# Patient Record
Sex: FEMALE | Race: WHITE | Employment: OTHER | URBAN - METROPOLITAN AREA
[De-identification: names, ages, dates, MRNs, and addresses within clinical notes are randomized per-mention and may not be internally consistent; named-entity substitution may affect disease eponyms.]

---

## 2017-01-11 ENCOUNTER — OFFICE VISIT (OUTPATIENT)
Dept: FAMILY MEDICINE CLINIC | Age: 67
End: 2017-01-11

## 2017-01-11 VITALS
SYSTOLIC BLOOD PRESSURE: 129 MMHG | HEIGHT: 67 IN | BODY MASS INDEX: 23.57 KG/M2 | WEIGHT: 150.2 LBS | TEMPERATURE: 97.8 F | HEART RATE: 67 BPM | RESPIRATION RATE: 12 BRPM | DIASTOLIC BLOOD PRESSURE: 86 MMHG

## 2017-01-11 DIAGNOSIS — L65.9 HAIR THINNING: ICD-10-CM

## 2017-01-11 DIAGNOSIS — E03.9 ACQUIRED HYPOTHYROIDISM: ICD-10-CM

## 2017-01-11 DIAGNOSIS — I10 ESSENTIAL HYPERTENSION: Primary | ICD-10-CM

## 2017-01-11 DIAGNOSIS — E78.00 HYPERCHOLESTEREMIA: ICD-10-CM

## 2017-01-11 DIAGNOSIS — M25.552 HIP PAIN, BILATERAL: ICD-10-CM

## 2017-01-11 DIAGNOSIS — M25.542 ARTHRALGIA OF BOTH HANDS: ICD-10-CM

## 2017-01-11 DIAGNOSIS — M25.551 HIP PAIN, BILATERAL: ICD-10-CM

## 2017-01-11 DIAGNOSIS — M25.541 ARTHRALGIA OF BOTH HANDS: ICD-10-CM

## 2017-01-11 LAB
ALBUMIN SERPL-MCNC: 4.6 GM/DL (ref 3.5–5.1)
ALP BLD-CCNC: 123 U/L (ref 38–126)
ALT SERPL-CCNC: 13 U/L (ref 11–66)
ANION GAP SERPL CALCULATED.3IONS-SCNC: 13 MEQ/L (ref 8–16)
AST SERPL-CCNC: 20 U/L (ref 5–40)
BACTERIA: NORMAL
BILIRUB SERPL-MCNC: 0.5 MG/DL (ref 0.3–1.2)
BILIRUBIN URINE: NEGATIVE
BLOOD, URINE: NEGATIVE
BUN BLDV-MCNC: 17 MG/DL (ref 7–22)
C-REACTIVE PROTEIN: 1.28 MG/DL (ref 0–1)
CALCIUM SERPL-MCNC: 9.8 MG/DL (ref 8.5–10.5)
CASTS: NORMAL /LPF
CASTS: NORMAL /LPF
CHARACTER, URINE: CLEAR
CHLORIDE BLD-SCNC: 100 MEQ/L (ref 98–111)
CHOLESTEROL, TOTAL: 268 MG/DL (ref 100–199)
CO2: 28 MEQ/L (ref 23–33)
COLOR: YELLOW
CREAT SERPL-MCNC: 0.7 MG/DL (ref 0.4–1.2)
CRYSTALS: NORMAL
EPITHELIAL CELLS, UA: NORMAL /HPF
FOLATE: > 20 NG/ML (ref 4.8–24.2)
GFR SERPL CREATININE-BSD FRML MDRD: 84 ML/MIN/1.73M2
GLUCOSE BLD-MCNC: 102 MG/DL (ref 70–108)
GLUCOSE, URINE: NEGATIVE MG/DL
HCT VFR BLD CALC: 41.2 % (ref 37–47)
HDLC SERPL-MCNC: 77 MG/DL
HEMOGLOBIN: 14 GM/DL (ref 12–16)
KETONES, URINE: NEGATIVE
LDL CHOLESTEROL CALCULATED: 168 MG/DL
LEUKOCYTE ESTERASE, URINE: NEGATIVE
MCH RBC QN AUTO: 30.3 PG (ref 27–31)
MCHC RBC AUTO-ENTMCNC: 34 GM/DL (ref 33–37)
MCV RBC AUTO: 89 FL (ref 81–99)
MISCELLANEOUS LAB TEST RESULT: NORMAL
NITRITE, URINE: NEGATIVE
PDW BLD-RTO: 13.3 % (ref 11.5–14.5)
PH UA: 6.5
PLATELET # BLD: 406 THOU/MM3 (ref 130–400)
PMV BLD AUTO: 7.6 MCM (ref 7.4–10.4)
POTASSIUM SERPL-SCNC: 4.1 MEQ/L (ref 3.5–5.2)
PROTEIN UA: NEGATIVE MG/DL
RBC # BLD: 4.63 MILL/MM3 (ref 4.2–5.4)
RBC URINE: NORMAL /HPF
RENAL EPITHELIAL, UA: NORMAL
SODIUM BLD-SCNC: 141 MEQ/L (ref 135–145)
SPECIFIC GRAVITY UA: 1.03 (ref 1–1.03)
TOTAL PROTEIN: 8.1 GM/DL (ref 6.1–8)
TRIGL SERPL-MCNC: 117 MG/DL (ref 0–199)
TSH SERPL DL<=0.05 MIU/L-ACNC: 1.4 MCIU/ML (ref 0.4–4.2)
UROBILINOGEN, URINE: 0.2 EU/DL
VITAMIN B-12: 562 PG/ML (ref 211–911)
WBC # BLD: 7.7 THOU/MM3 (ref 4.8–10.8)
WBC UA: NORMAL /HPF
YEAST: NORMAL

## 2017-01-11 PROCEDURE — 99214 OFFICE O/P EST MOD 30 MIN: CPT | Performed by: FAMILY MEDICINE

## 2017-01-11 PROCEDURE — 90732 PPSV23 VACC 2 YRS+ SUBQ/IM: CPT | Performed by: FAMILY MEDICINE

## 2017-01-11 PROCEDURE — 90471 IMMUNIZATION ADMIN: CPT | Performed by: FAMILY MEDICINE

## 2017-01-12 ENCOUNTER — TELEPHONE (OUTPATIENT)
Dept: FAMILY MEDICINE CLINIC | Age: 67
End: 2017-01-12

## 2017-01-12 DIAGNOSIS — E78.00 HIGH CHOLESTEROL: Primary | ICD-10-CM

## 2017-01-12 LAB
THYROGLOBULIN AB: < 0.9 IU/ML (ref 0–4)
THYROID PEROXIDASE ANTIBODY: 0.9 IU/ML (ref 0–9)

## 2017-01-13 ENCOUNTER — TELEPHONE (OUTPATIENT)
Dept: FAMILY MEDICINE CLINIC | Age: 67
End: 2017-01-13

## 2017-01-13 LAB
ALLERGEN INTERPRETATION/SCORE: NORMAL
ALLERGEN LATEX: NORMAL
ANA SCREEN: NORMAL

## 2017-01-16 ENCOUNTER — TELEPHONE (OUTPATIENT)
Dept: FAMILY MEDICINE CLINIC | Age: 67
End: 2017-01-16

## 2017-03-13 RX ORDER — LEVOTHYROXINE SODIUM 0.05 MG/1
50 TABLET ORAL DAILY
Qty: 90 TABLET | Refills: 1 | Status: SHIPPED | OUTPATIENT
Start: 2017-03-13 | End: 2017-09-09 | Stop reason: SDUPTHER

## 2017-03-13 RX ORDER — LISINOPRIL 20 MG/1
20 TABLET ORAL DAILY
Qty: 90 TABLET | Refills: 1 | Status: SHIPPED | OUTPATIENT
Start: 2017-03-13 | End: 2017-04-17

## 2017-04-17 ENCOUNTER — OFFICE VISIT (OUTPATIENT)
Dept: FAMILY MEDICINE CLINIC | Age: 67
End: 2017-04-17

## 2017-04-17 VITALS
DIASTOLIC BLOOD PRESSURE: 80 MMHG | HEIGHT: 67 IN | TEMPERATURE: 97.6 F | RESPIRATION RATE: 12 BRPM | SYSTOLIC BLOOD PRESSURE: 120 MMHG | BODY MASS INDEX: 20.94 KG/M2 | HEART RATE: 60 BPM | WEIGHT: 133.4 LBS

## 2017-04-17 DIAGNOSIS — Z13.6 SCREENING FOR CARDIOVASCULAR CONDITION: ICD-10-CM

## 2017-04-17 DIAGNOSIS — Z13.1 SCREENING FOR DIABETES MELLITUS (DM): ICD-10-CM

## 2017-04-17 DIAGNOSIS — Z13.820 OSTEOPOROSIS SCREENING: ICD-10-CM

## 2017-04-17 DIAGNOSIS — Z78.0 ASYMPTOMATIC MENOPAUSAL STATE: ICD-10-CM

## 2017-04-17 DIAGNOSIS — Z11.59 NEED FOR HEPATITIS C SCREENING TEST: ICD-10-CM

## 2017-04-17 DIAGNOSIS — Z11.4 SCREENING FOR HIV WITHOUT PRESENCE OF RISK FACTORS: ICD-10-CM

## 2017-04-17 DIAGNOSIS — Z00.00 ROUTINE GENERAL MEDICAL EXAMINATION AT A HEALTH CARE FACILITY: Primary | ICD-10-CM

## 2017-04-17 DIAGNOSIS — Z72.89 OTHER PROBLEMS RELATED TO LIFESTYLE: ICD-10-CM

## 2017-04-17 DIAGNOSIS — Z23 NEED FOR SHINGLES VACCINE: ICD-10-CM

## 2017-04-17 LAB
CHOLESTEROL, TOTAL: 201 MG/DL (ref 100–199)
GLUCOSE BLD-MCNC: 105 MG/DL (ref 70–108)
HDLC SERPL-MCNC: 67 MG/DL
HEPATITIS C ANTIBODY: NEGATIVE
LDL CHOLESTEROL CALCULATED: 121 MG/DL
TRIGL SERPL-MCNC: 66 MG/DL (ref 0–199)

## 2017-04-17 PROCEDURE — G0403 EKG FOR INITIAL PREVENT EXAM: HCPCS | Performed by: FAMILY MEDICINE

## 2017-04-17 PROCEDURE — G0402 INITIAL PREVENTIVE EXAM: HCPCS | Performed by: FAMILY MEDICINE

## 2017-04-17 PROCEDURE — 36415 COLL VENOUS BLD VENIPUNCTURE: CPT | Performed by: FAMILY MEDICINE

## 2017-04-17 RX ORDER — LISINOPRIL 10 MG/1
10 TABLET ORAL DAILY
Qty: 30 TABLET | Refills: 3 | Status: SHIPPED | OUTPATIENT
Start: 2017-04-17 | End: 2017-04-18 | Stop reason: SDUPTHER

## 2017-04-17 RX ORDER — KRILL/OM-3/DHA/EPA/PHOSPHO/AST 500MG-86MG
1 CAPSULE ORAL DAILY
COMMUNITY

## 2017-04-17 ASSESSMENT — LIFESTYLE VARIABLES
HOW OFTEN DURING THE LAST YEAR HAVE YOU NEEDED AN ALCOHOLIC DRINK FIRST THING IN THE MORNING TO GET YOURSELF GOING AFTER A NIGHT OF HEAVY DRINKING: 0
HOW OFTEN DURING THE LAST YEAR HAVE YOU FOUND THAT YOU WERE NOT ABLE TO STOP DRINKING ONCE YOU HAD STARTED: 0
HAVE YOU OR SOMEONE ELSE BEEN INJURED AS A RESULT OF YOUR DRINKING: 0
HAS A RELATIVE, FRIEND, DOCTOR, OR ANOTHER HEALTH PROFESSIONAL EXPRESSED CONCERN ABOUT YOUR DRINKING OR SUGGESTED YOU CUT DOWN: 0
AUDIT-C TOTAL SCORE: 1
HOW OFTEN DURING THE LAST YEAR HAVE YOU HAD A FEELING OF GUILT OR REMORSE AFTER DRINKING: 0
HOW OFTEN DO YOU HAVE A DRINK CONTAINING ALCOHOL: 1
HOW OFTEN DURING THE LAST YEAR HAVE YOU BEEN UNABLE TO REMEMBER WHAT HAPPENED THE NIGHT BEFORE BECAUSE YOU HAD BEEN DRINKING: 0
HOW MANY STANDARD DRINKS CONTAINING ALCOHOL DO YOU HAVE ON A TYPICAL DAY: 0
HOW OFTEN DO YOU HAVE SIX OR MORE DRINKS ON ONE OCCASION: 0
HOW OFTEN DURING THE LAST YEAR HAVE YOU FAILED TO DO WHAT WAS NORMALLY EXPECTED FROM YOU BECAUSE OF DRINKING: 0
AUDIT TOTAL SCORE: 1

## 2017-04-17 ASSESSMENT — PATIENT HEALTH QUESTIONNAIRE - PHQ9: SUM OF ALL RESPONSES TO PHQ QUESTIONS 1-9: 0

## 2017-04-17 ASSESSMENT — ANXIETY QUESTIONNAIRES: GAD7 TOTAL SCORE: 0

## 2017-04-18 ENCOUNTER — TELEPHONE (OUTPATIENT)
Dept: FAMILY MEDICINE CLINIC | Age: 67
End: 2017-04-18

## 2017-04-18 RX ORDER — LISINOPRIL 10 MG/1
10 TABLET ORAL DAILY
Qty: 90 TABLET | Refills: 1 | Status: SHIPPED | OUTPATIENT
Start: 2017-04-18 | End: 2017-10-17 | Stop reason: SDUPTHER

## 2017-04-19 ENCOUNTER — TELEPHONE (OUTPATIENT)
Dept: FAMILY MEDICINE CLINIC | Age: 67
End: 2017-04-19

## 2017-04-19 LAB — HIV-2 AB: NEGATIVE

## 2017-05-12 ENCOUNTER — TELEPHONE (OUTPATIENT)
Dept: FAMILY MEDICINE CLINIC | Age: 67
End: 2017-05-12

## 2017-10-17 ENCOUNTER — OFFICE VISIT (OUTPATIENT)
Dept: FAMILY MEDICINE CLINIC | Age: 67
End: 2017-10-17
Payer: MEDICARE

## 2017-10-17 VITALS
HEART RATE: 68 BPM | OXYGEN SATURATION: 98 % | DIASTOLIC BLOOD PRESSURE: 74 MMHG | HEIGHT: 67 IN | SYSTOLIC BLOOD PRESSURE: 132 MMHG | WEIGHT: 138 LBS | BODY MASS INDEX: 21.66 KG/M2

## 2017-10-17 DIAGNOSIS — M51.36 DEGENERATIVE DISC DISEASE, LUMBAR: ICD-10-CM

## 2017-10-17 DIAGNOSIS — E03.9 ACQUIRED HYPOTHYROIDISM: ICD-10-CM

## 2017-10-17 DIAGNOSIS — E78.00 HYPERCHOLESTEREMIA: ICD-10-CM

## 2017-10-17 DIAGNOSIS — I10 ESSENTIAL HYPERTENSION: Primary | ICD-10-CM

## 2017-10-17 PROCEDURE — 3017F COLORECTAL CA SCREEN DOC REV: CPT | Performed by: FAMILY MEDICINE

## 2017-10-17 PROCEDURE — G8484 FLU IMMUNIZE NO ADMIN: HCPCS | Performed by: FAMILY MEDICINE

## 2017-10-17 PROCEDURE — 1090F PRES/ABSN URINE INCON ASSESS: CPT | Performed by: FAMILY MEDICINE

## 2017-10-17 PROCEDURE — 4040F PNEUMOC VAC/ADMIN/RCVD: CPT | Performed by: FAMILY MEDICINE

## 2017-10-17 PROCEDURE — G8427 DOCREV CUR MEDS BY ELIG CLIN: HCPCS | Performed by: FAMILY MEDICINE

## 2017-10-17 PROCEDURE — 99214 OFFICE O/P EST MOD 30 MIN: CPT | Performed by: FAMILY MEDICINE

## 2017-10-17 PROCEDURE — G8400 PT W/DXA NO RESULTS DOC: HCPCS | Performed by: FAMILY MEDICINE

## 2017-10-17 PROCEDURE — G8420 CALC BMI NORM PARAMETERS: HCPCS | Performed by: FAMILY MEDICINE

## 2017-10-17 PROCEDURE — 3014F SCREEN MAMMO DOC REV: CPT | Performed by: FAMILY MEDICINE

## 2017-10-17 PROCEDURE — 1036F TOBACCO NON-USER: CPT | Performed by: FAMILY MEDICINE

## 2017-10-17 PROCEDURE — 1123F ACP DISCUSS/DSCN MKR DOCD: CPT | Performed by: FAMILY MEDICINE

## 2017-10-17 RX ORDER — CYCLOBENZAPRINE HCL 5 MG
5 TABLET ORAL 3 TIMES DAILY PRN
Qty: 270 TABLET | Refills: 1 | Status: CANCELLED | OUTPATIENT
Start: 2017-10-17

## 2017-10-17 RX ORDER — OMEPRAZOLE 40 MG/1
40 CAPSULE, DELAYED RELEASE ORAL DAILY
Qty: 90 CAPSULE | Refills: 1 | Status: CANCELLED | OUTPATIENT
Start: 2017-10-17

## 2017-10-17 RX ORDER — LISINOPRIL 10 MG/1
10 TABLET ORAL DAILY
Qty: 90 TABLET | Refills: 1 | Status: SHIPPED | OUTPATIENT
Start: 2017-10-17 | End: 2018-02-06 | Stop reason: SDUPTHER

## 2017-10-17 RX ORDER — FLUTICASONE PROPIONATE 50 MCG
1 SPRAY, SUSPENSION (ML) NASAL DAILY
Qty: 3 BOTTLE | Refills: 1 | Status: CANCELLED | OUTPATIENT
Start: 2017-10-17

## 2017-10-17 RX ORDER — HYDROCODONE BITARTRATE AND ACETAMINOPHEN 5; 325 MG/1; MG/1
1 TABLET ORAL EVERY 8 HOURS PRN
Qty: 90 TABLET | Refills: 0 | Status: CANCELLED | OUTPATIENT
Start: 2017-10-17

## 2017-10-17 RX ORDER — LEVOTHYROXINE SODIUM 0.05 MG/1
TABLET ORAL
Qty: 90 TABLET | Refills: 1 | Status: SHIPPED | OUTPATIENT
Start: 2017-10-17 | End: 2018-02-06 | Stop reason: SDUPTHER

## 2017-10-17 NOTE — PROGRESS NOTES
dust, mold    Sudafed [Pseudoephedrine Hcl] Other (See Comments)     Restless feeling. Review of Systems:     General ROS: negative for - fever or sleep disturbance. Psychological ROS: negative for - anxiety or depression  Respiratory ROS: no cough, shortness of breath, or wheezing  Cardiovascular ROS: no chest pain or dyspnea on exertion. No edema. Gastrointestinal ROS: negative for - abdominal pain, constipation,  or nausea/vomiting. Neurological ROS: negative for - dizziness or headaches  Musculo-skeletal ROS: positive back pain  Skin ROS: no rashes. positive for dry skin     Physical Examination:     Blood pressure 132/74, pulse 68, height 5' 6.53\" (1.69 m), weight 138 lb (62.6 kg), SpO2 98 %. General appearance - alert, well appearing, and in no distress  Mental status - alert, oriented to person, place, and time  HEENT - NC, AT, PERRLA, EOMI, anicteric sclerae. Ear - normal tympanic membrane bilateraly  Nose - no lesions, no drainage  Neck - supple, no significant adenopathy  Chest - clear to auscultation, no wheezes, rales or rhonchi  Heart - normal rate, regular rhythm  Abdomen - soft, nontender, nondistended, no masses or organomegaly  Extremities -  no pedal edema, no clubbing or cyanosis  Skin -  no rashes, no suspicious skin lesions noted    Assessment:    1. Essential hypertension     2. Acquired hypothyroidism     3. Hypercholesteremia     4.  Degenerative disc disease, lumbar         Plan:    Continue Lisinopril 10 mg 1 tablet PO daily  Continue Synthroid 50 ug 1 tablet PO daily  Low cholesterol diet  Continue Prilosec 40 mg 1 tablet PO daily  Continue Norco and Flexeril as needed when pain severe  She had her Influenza vaccine at work last Friday October 13, 2017  Follow up 6 months    Pat Irwin MD

## 2017-10-17 NOTE — PATIENT INSTRUCTIONS
million people vaccinated. This is much lower than the risk of severe complications from flu, which can be prevented by flu vaccine. · Tita James children who get the flu shot along with pneumococcal vaccine (PCV13) and/or DTaP vaccine at the same time might be slightly more likely to have a seizure caused by fever. Ask your doctor for more information. Tell your doctor if a child who is getting flu vaccine has ever had a seizure  Problems that could happen after any injected vaccine:  · People sometimes faint after a medical procedure, including vaccination. Sitting or lying down for about 15 minutes can help prevent fainting, and injuries caused by a fall. Tell your doctor if you feel dizzy, or have vision changes or ringing in the ears. · Some people get severe pain in the shoulder and have difficulty moving the arm where a shot was given. This happens very rarely. · Any medication can cause a severe allergic reaction. Such reactions from a vaccine are very rare, estimated at about 1 in a million doses, and would happen within a few minutes to a few hours after the vaccination. As with any medicine, there is a very remote chance of a vaccine causing a serious injury or death. The safety of vaccines is always being monitored. For more information, visit: www.cdc.gov/vaccinesafety/. What if there is a serious reaction? What should I look for? · Look for anything that concerns you, such as signs of a severe allergic reaction, very high fever, or unusual behavior. Signs of a severe allergic reaction can include hives, swelling of the face and throat, difficulty breathing, a fast heartbeat, dizziness, and weakness - usually within a few minutes to a few hours after the vaccination. What should I do? · If you think it is a severe allergic reaction or other emergency that can't wait, call 9-1-1 and get the person to the nearest hospital. Otherwise, call your doctor.   · Reactions should be reported to the \"Vaccine manage pain from this problem. · To relieve pain, use ice or heat (whichever feels better) on the affected area. ¨ Put ice or a cold pack on the area for 10 to 20 minutes at a time. Put a thin cloth between the ice and your skin. ¨ Put a warm water bottle, a heating pad set on low, or a warm cloth on your back. Put a thin cloth between the heating pad and your skin. Do not go to sleep with a heating pad on your skin. · Ask your doctor if you can take acetaminophen (such as Tylenol) or nonsteroidal anti-inflammatory drugs, such as ibuprofen or naproxen. Your doctor can prescribe stronger medicines if needed. Be safe with medicines. Read and follow all instructions on the label. · Get some exercise every day. Exercise is one of the best ways to help your back feel better and stay better. It's best to start each exercise slowly. You may notice a little soreness, and that's okay. But if an exercise makes your pain worse, stop doing it. Here are things you can try:  ¨ Walking. It's the simplest and maybe the best activity for your back. It gets your blood moving and helps your muscles stay strong. ¨ Exercises that gently stretch and strengthen your stomach, back, and leg muscles. The stronger those muscles are, the better they're able to protect your back. If you have constant or severe pain in your back or spine, you may need other treatments, such as physical therapy. In some cases, your doctor may suggest surgery. Follow-up care is a key part of your treatment and safety. Be sure to make and go to all appointments, and call your doctor if you are having problems. It's also a good idea to know your test results and keep a list of the medicines you take. Where can you learn more? Go to https://LeadFirechivo.Glympse. org and sign in to your Uro Jock account. Enter F268 in the CelluFuel box to learn more about \"Learning About Degenerative Disc Disease. \"     If you do not have an account, please click on the \"Sign Up Now\" link. Current as of: March 21, 2017  Content Version: 11.3  © 4695-6220 [employer]yes. Care instructions adapted under license by [facility] ([facility]). If you have questions about a medical condition or this instruction, always ask your healthcare professional. [address] any warranty or liability for your use of this information. Patient Education        Back Care and Preventing Injuries: Care Instructions  Your Care Instructions  You can hurt your back doing many everyday activities: lifting a heavy box, bending down to garden, exercising at the gym, and even getting out of bed. But you can keep your back strong and healthy by doing some exercises. You also can follow a few tips for sitting, sleeping, and lifting to avoid hurting your back again. Talk to your doctor before you start an exercise program. Ask for help if you want to learn more about keeping your back healthy. Follow-up care is a key part of your treatment and safety. Be sure to make and go to all appointments, and call your doctor if you are having problems. It's also a good idea to know your test results and keep a list of the medicines you take. How can you care for yourself at home? · Stay at a healthy weight to avoid strain on your lower back. · Do not smoke. Smoking increases the risk of osteoporosis, which weakens the spine. If you need help quitting, talk to your doctor about stop-smoking programs and medicines. These can increase your chances of quitting for good. · Make sure you sleep in a position that maintains your back's normal curves and on a mattress that feels comfortable. Sleep on your side with a pillow between your knees, or sleep on your back with a pillow under your knees. These positions can reduce strain on your back. · When you get out of bed, lie on your side and bend both knees. Drop your feet over the edge of the bed as you push up with both arms. Scoot to the edge of the bed. Make sure your feet are in line with your rear end (buttocks), and then stand up. · If you must stand for a long time, put one foot on a stool, ledge, or box. Exercise to strengthen your back and other muscles  · Get at least 30 minutes of exercise on most days of the week. Walking is a good choice. You also may want to do other activities, such as running, swimming, cycling, or playing tennis or team sports. · Stretch your back muscles. Here are few exercises to try:  Renelle Borne on your back with your knees bent and your feet flat on the floor. Gently pull one bent knee to your chest. Put that foot back on the floor, and then pull the other knee to your chest. Hold for 15 to 30 seconds. Repeat 2 to 4 times. ¨ Do pelvic tilts. Lie on your back with your knees bent. Tighten your stomach muscles. Pull your belly button (navel) in and up toward your ribs. You should feel like your back is pressing to the floor and your hips and pelvis are slightly lifting off the floor. Hold for 6 seconds while breathing smoothly. · Keep your core muscles strong. The muscles of your back, belly (abdomen), and buttocks support your spine. ¨ Pull in your belly, and imagine pulling your navel toward your spine. Hold this for 6 seconds, then relax. Remember to keep breathing normally as you tense your muscles. ¨ Do curl-ups. Always do them with your knees bent. Keep your low back on the floor, and curl your shoulders toward your knees using a smooth, slow motion. Keep your arms folded across your chest. If this bothers your neck, try putting your hands behind your neck (not your head), with your elbows spread apart. ¨ Lie on your back with your knees bent and your feet flat on the floor. Tighten your belly muscles, and then push with your feet and raise your buttocks up a few inches. Hold this position 6 seconds as you continue to breathe normally, then lower yourself slowly to the floor.  Repeat 8 to 12 times.  ¨ If you like group exercise, try Pilates or yoga. These classes have poses that strengthen the core muscles. Protect your back when you sit  · Place a small pillow, a rolled-up towel, or a lumbar roll in the curve of your back if you need extra support. · Sit in a chair that is low enough to let you place both feet flat on the floor with both knees nearly level with your hips. If your chair or desk is too high, use a foot rest to raise your knees. · When driving, keep your knees nearly level with your hips. Sit straight, and drive with both hands on the steering wheel. Your arms should be in a slightly bent position. · Try a kneeling chair, which helps tilt your hips forward. This takes pressure off your lower back. · Try sitting on an exercise ball. It can rock from side to side, which helps keep your back loose. Lift properly  · Squat down, bending at the hips and knees only. If you need to, put one knee to the floor and extend your other knee in front of you, bent at a right angle (half kneeling). · Press your chest straight forward. This helps keep your upper back straight while keeping a slight arch in your low back. · Hold the load as close to your body as possible, at the level of your navel. · Use your feet to change direction, taking small steps. · Lead with your hips as you change direction. Keep your shoulders in line with your hips as you move. Do not twist your body. · Set down your load carefully, squatting with your knees and hips only. When should you call for help? Watch closely for changes in your health, and be sure to contact your doctor if:  · You want more exercises to make your back and other core muscles stronger. Where can you learn more? Go to https://mecca.Pivot Data Center. org and sign in to your Appwiz account. Enter S810 in the Productify box to learn more about \"Back Care and Preventing Injuries: Care Instructions. \"     If you do not have an medicine for pain, take it as prescribed. ¨ If you are not taking a prescription pain medicine, ask your doctor if you can take an over-the-counter medicine. What else can you do? · Stretch and exercise. Exercises that increase flexibility may relieve your pain and make it easier for your muscles to keep your spine in a good, neutral position. And don't forget to keep walking. · Do self-massage. You can use self-massage to unwind after work or school or to energize yourself in the morning. You can easily massage your feet, hands, or neck. Self-massage works best if you are in comfortable clothes and are sitting or lying in a comfortable position. Use oil or lotion to massage bare skin. · Reduce stress. Back pain can lead to a vicious [place]: Distress about the pain tenses the muscles in your back, which in turn causes more pain. Learn how to relax your mind and your muscles to lower your stress. Where can you learn more? Go to https://Kohortchivo.UB.. org and sign in to your Appy Couple account. Enter W722 in the SLID box to learn more about \"Learning About Relief for Back Pain. \"     If you do not have an account, please click on the \"Sign Up Now\" link. Current as of: March 21, 2017  Content Version: 11.3  © 4653-0230 Transinsight. Care instructions adapted under license by [facility] ([facility]). If you have questions about a medical condition or this instruction, always ask your healthcare professional. [place] [address] any warranty or liability for your use of this information. Patient Education        Therapeutic Ball: Back Exercises  Your Care Instructions  Here are some examples of typical exercises for your condition. Start each exercise slowly. Ease off the exercise if you start to have pain. Your doctor or physical therapist will tell you when you can start these exercises and which ones will work best for you.   To prepare, make sure that your your side, press your hands onto the floor for stability. 4. Tighten your belly muscles by pulling in your belly button toward your spine. 5. Push your heels down toward the floor, squeeze your buttocks, and lift your hips off the floor until your shoulders, hips, and knees are all in a straight line. 6. While holding the bridge position, roll the ball toward you with your heels. Keep your hips as level as you can. 7. Pause briefly, and then roll the ball back out. Try to keep the ball rolling straight. You will feel the muscles in your lower belly working as you straighten your legs. 8. Lower your hips, and return to your starting position. 9. Repeat 8 to 12 times. When you can keep your body and the ball steady throughout this exercise, you're ready for more challenge. Try keeping your hips raised while rolling the ball out, holding the bridge, and rolling back, a few times in a row. Praying yao    1. Kneel upright with the ball in front of you. 2. To start, clasp your hands together. Rest them on the ball in front of you. 3. As you do this exercise, keep your back and hips straight and tighten your belly and buttocks muscles. Keep your knees in place. 4. Press on the ball with your arms. Lean forward from the knees. This rolls the ball forward. You will bear most of your weight on your arms. 5. If your back starts to ache, you've gone too far. Pull back a bit. 6. Roll back to the start position. 7. Repeat 8 to 12 times. Walk-out plank on ball    1. Kneel over the ball. Place your hands on the floor in front of you. 2. Walk your hands forward until your legs are straight on the ball. This is the plank position. 3. When in plank position, hold your body straight and tighten your belly and buttocks muscles. Keep your chin slightly tucked. 4. Roll as far forward as you can without losing your balance or letting your hips drop.  You may stop with the ball under your thighs, or even under your knees or shins. 5. Hold a few seconds, then walk your hands back and return to the start position. 6. Repeat 8 to 12 times. Push-up with thighs on ball    1. Kneel over the ball. Place your hands on the floor in front of you. 2. Walk your hands forward until your legs are straight on the ball. This is the plank position. 3. When in plank position, hold your body straight and tighten your belly and buttocks muscles. Keep your chin slightly tucked. 4. Roll as far forward as you can without losing your balance or letting your hips drop. You may stop with the ball under your thighs, or even under your knees or shins. 5. Bend your elbows. Slowly lower your body toward the ground as far as you can without losing your balance. 6. If your wrists hurt, try moving your hands a little farther apart so they're not right under your shoulders. 7. Slowly straighten your arms. 8. Do 8 to 12 of these push-ups. Wall squat with ball    1. Stand facing away from a wall. Place your feet about shoulder-width apart. 2. Place the ball between your middle back and the wall. Move your feet out in front of you so they are about a foot in front of your hips. 3. Keep your arms at your sides, or put your hands on your hips. 4. Slowly squat down as if you are going to sit in a chair, rolling your back over the ball as you squat. The ball should move with you but stay pressed into the wall. 5. Be sure that your knees do not go in front of your toes as you squat. 6. Hold for 6 seconds. 7. Slowly rise to your standing position. 8. Repeat 8 to 12 times. Child's pose with ball    1. Kneeling upright with your back straight, rest your hands on the ball in front of you. 2. Breathe out as you bend at the hips, and roll the ball forward. Lower your chest toward the ground, and drop your hips back toward your heels. 3. To stretch your upper back and shoulders, hold this position for 15 to 30 seconds. 4. Repeat 2 to 4 times.   Follow-up care is a key part of your treatment and safety. Be sure to make and go to all appointments, and call your doctor if you are having problems. It's also a good idea to know your test results and keep a list of the medicines you take. Where can you learn more? Go to https://chpepiceweb.Swiftype. org and sign in to your MD Lingo account. Enter P520 in the LifeBlinx box to learn more about \"Therapeutic Ball: Back Exercises. \"     If you do not have an account, please click on the \"Sign Up Now\" link. Current as of: March 21, 2017  Content Version: 11.3  © 6796-3321 [employer]. Care instructions adapted under license by [facility] ([facility]). If you have questions about a medical condition or this instruction, always ask your healthcare professional. [address] any warranty or liability for your use of this information. Patient Education        Low Back Pain: Exercises  Your Care Instructions  Here are some examples of typical rehabilitation exercises for your condition. Start each exercise slowly. Ease off the exercise if you start to have pain. Your doctor or physical therapist will tell you when you can start these exercises and which ones will work best for you. How to do the exercises  Press-up    7. Lie on your stomach, supporting your body with your forearms. 8. Press your elbows down into the floor to raise your upper back. As you do this, relax your stomach muscles and allow your back to arch without using your back muscles. As your press up, do not let your hips or pelvis come off the floor. 9. Hold for 15 to 30 seconds, then relax. 10. Repeat 2 to 4 times. Alternate arm and leg (bird dog) exercise    Note: Do this exercise slowly. Try to keep your body straight at all times, and do not let one hip drop lower than the other. 5. Start on the floor, on your hands and knees. 6. Tighten your belly muscles.   7. Raise one leg off the floor, and hold it straight out behind you. Be careful not to let your hip drop down, because that will twist your trunk. 8. Hold for about 6 seconds, then lower your leg and switch to the other leg. 9. Repeat 8 to 12 times on each leg. 10. Over time, work up to holding for 10 to 30 seconds each time. 11. If you feel stable and secure with your leg raised, try raising the opposite arm straight out in front of you at the same time. Knee-to-chest exercise    5. Lie on your back with your knees bent and your feet flat on the floor. 6. Bring one knee to your chest, keeping the other foot flat on the floor (or keeping the other leg straight, whichever feels better on your lower back). 7. Keep your lower back pressed to the floor. Hold for at least 15 to 30 seconds. 8. Relax, and lower the knee to the starting position. 9. Repeat with the other leg. Repeat 2 to 4 times with each leg. 10. To get more stretch, put your other leg flat on the floor while pulling your knee to your chest.  Curl-ups    5. Lie on the floor on your back with your knees bent at a 90-degree angle. Your feet should be flat on the floor, about 12 inches from your buttocks. 6. Cross your arms over your chest. If this bothers your neck, try putting your hands behind your neck (not your head), with your elbows spread apart. 7. Slowly tighten your belly muscles and raise your shoulder blades off the floor. 8. Keep your head in line with your body, and do not press your chin to your chest.  9. Hold this position for 1 or 2 seconds, then slowly lower yourself back down to the floor. 10. Repeat 8 to 12 times. Pelvic tilt exercise    6. Lie on your back with your knees bent. 7. \"Brace\" your stomach. This means to tighten your muscles by pulling in and imagining your belly button moving toward your spine. You should feel like your back is pressing to the floor and your hips and pelvis are rocking back.   8. Hold for about 6 seconds while you breathe smoothly. 9. Repeat 8 to 12 times. Heel dig bridging    5. Lie on your back with both knees bent and your ankles bent so that only your heels are digging into the floor. Your knees should be bent about 90 degrees. 6. Then push your heels into the floor, squeeze your buttocks, and lift your hips off the floor until your shoulders, hips, and knees are all in a straight line. 7. Hold for about 6 seconds as you continue to breathe normally, and then slowly lower your hips back down to the floor and rest for up to 10 seconds. 8. Do 8 to 12 repetitions. Hamstring stretch in doorway    9. Lie on your back in a doorway, with one leg through the open door. 10. Slide your leg up the wall to straighten your knee. You should feel a gentle stretch down the back of your leg. 11. Hold the stretch for at least 15 to 30 seconds. Do not arch your back, point your toes, or bend either knee. Keep one heel touching the floor and the other heel touching the wall. 12. Repeat with your other leg. 13. Do 2 to 4 times for each leg. Hip flexor stretch    10. Kneel on the floor with one knee bent and one leg behind you. Place your forward knee over your foot. Keep your other knee touching the floor. 11. Slowly push your hips forward until you feel a stretch in the upper thigh of your rear leg. 12. Hold the stretch for at least 15 to 30 seconds. Repeat with your other leg. 13. Do 2 to 4 times on each side. Wall sit    8. Stand with your back 10 to 12 inches away from a wall. 9. Lean into the wall until your back is flat against it. 10. Slowly slide down until your knees are slightly bent, pressing your lower back into the wall. 11. Hold for about 6 seconds, then slide back up the wall. 12. Repeat 8 to 12 times. Follow-up care is a key part of your treatment and safety. Be sure to make and go to all appointments, and call your doctor if you are having problems.  It's also a good idea to know your test results and keep a list of the medicines you take. Where can you learn more? Go to https://chpepiceweb.Seek & Adore. org and sign in to your Errand Boy Delivery Business Plan account. Enter K744 in the OpenDNS box to learn more about \"Low Back Pain: Exercises. \"     If you do not have an account, please click on the \"Sign Up Now\" link. Current as of: March 21, 2017  Content Version: 11.3  © 0205-1331 SkinMedica. Care instructions adapted under license by [facility] ([facility]). If you have questions about a medical condition or this instruction, always ask your healthcare professional. [address] any warranty or liability for your use of this information. Patient Education        Acute Low Back Pain: Exercises  Your Care Instructions  Here are some examples of typical rehabilitation exercises for your condition. Start each exercise slowly. Ease off the exercise if you start to have pain. Your doctor or physical therapist will tell you when you can start these exercises and which ones will work best for you. When you are not being active, find a comfortable position for rest. Some people are comfortable on the floor or a medium-firm bed with a small pillow under their head and another under their knees. Some people prefer to lie on their side with a pillow between their knees. Don't stay in one position for too long. Take short walks (10 to 20 minutes) every 2 to 3 hours. Avoid slopes, hills, and stairs until you feel better. Walk only distances you can manage without pain, especially leg pain. How to do the exercises  Back stretches    11. Get down on your hands and knees on the floor. 12. Relax your head and allow it to droop. Round your back up toward the ceiling until you feel a nice stretch in your upper, middle, and lower back. Hold this stretch for as long as it feels comfortable, or about 15 to 30 seconds.   13. Return to the starting position with a flat back while you are on your hands and

## 2017-10-23 ENCOUNTER — APPOINTMENT (OUTPATIENT)
Dept: GENERAL RADIOLOGY | Age: 67
End: 2017-10-23
Payer: MEDICARE

## 2017-10-23 ENCOUNTER — HOSPITAL ENCOUNTER (EMERGENCY)
Age: 67
Discharge: HOME | End: 2017-10-23
Payer: MEDICARE

## 2017-10-23 VITALS
OXYGEN SATURATION: 97 % | DIASTOLIC BLOOD PRESSURE: 68 MMHG | HEART RATE: 79 BPM | TEMPERATURE: 98.2 F | SYSTOLIC BLOOD PRESSURE: 143 MMHG | RESPIRATION RATE: 18 BRPM

## 2017-10-23 DIAGNOSIS — S90.111A CONTUSION OF RIGHT GREAT TOE WITHOUT DAMAGE TO NAIL, INITIAL ENCOUNTER: Primary | ICD-10-CM

## 2017-10-23 PROCEDURE — 99283 EMERGENCY DEPT VISIT LOW MDM: CPT

## 2017-10-23 PROCEDURE — 73660 X-RAY EXAM OF TOE(S): CPT

## 2017-10-23 ASSESSMENT — ENCOUNTER SYMPTOMS
SORE THROAT: 0
EYE PAIN: 0
EYE DISCHARGE: 0
ABDOMINAL PAIN: 0
SHORTNESS OF BREATH: 0
BACK PAIN: 0
COUGH: 0
WHEEZING: 0
VOMITING: 0
DIARRHEA: 0
NAUSEA: 0
RHINORRHEA: 0

## 2017-10-23 ASSESSMENT — PAIN DESCRIPTION - ORIENTATION: ORIENTATION: RIGHT

## 2017-10-23 ASSESSMENT — PAIN DESCRIPTION - LOCATION: LOCATION: FOOT

## 2017-10-23 ASSESSMENT — PAIN SCALES - GENERAL: PAINLEVEL_OUTOF10: 5

## 2017-10-23 NOTE — ED PROVIDER NOTES
icterus. Neck: Normal range of motion. Neck supple. No JVD present. Pulmonary/Chest: Effort normal. No stridor. No respiratory distress. Abdominal: Soft. She exhibits no distension. Musculoskeletal: Normal range of motion. She exhibits no edema. Bruising and swelling to the right great toe. Erythema noted on bunyon   Neurological: She is alert and oriented to person, place, and time. She exhibits normal muscle tone. GCS eye subscore is 4. GCS verbal subscore is 5. GCS motor subscore is 6. Skin: Skin is warm and dry. She is not diaphoretic. No erythema. Psychiatric: She has a normal mood and affect. Her behavior is normal.   Nursing note and vitals reviewed. DIFFERENTIAL DIAGNOSIS:   Contusion,strain, fracture     DIAGNOSTIC RESULTS     EKG: All EKG's are interpreted by the Emergency Department Physician who either signs or Co-signs this chart in the absence of a cardiologist.  None    RADIOLOGY: non-plain film images(s) such as CT, Ultrasound and MRI are read by the radiologist.  No acute fracture    LABS:   None    EMERGENCY DEPARTMENT COURSE:   Vitals:    Vitals:    10/23/17 0316   BP: (!) 143/68   Pulse: 79   Resp: 18   Temp: 98.2 °F (36.8 °C)   TempSrc: Oral   SpO2: 97%       4:31 AM: The patient was seen and evaluated. MDM:  The patient was seen and evaluated within the ED today following a great toe injury to the right foot. Within the department, I observed the patient's vital signs to be within acceptable range. On exam, I appreciated bruising and swelling to the effected toe. Radiological studies within the department revealed no acute fracutre. I observed the patient's condition to remain stable during the duration of their stay. I explained my proposed course of treatment to the patient, and they were amenable to my decision. They were discharged home, and they will return to the ED if their symptoms become more severe in nature, or otherwise change.      CRITICAL CARE:   None

## 2018-02-06 DIAGNOSIS — E03.9 HYPOTHYROIDISM, UNSPECIFIED TYPE: Primary | ICD-10-CM

## 2018-02-06 RX ORDER — LEVOTHYROXINE SODIUM 0.05 MG/1
TABLET ORAL
Qty: 90 TABLET | Refills: 0 | Status: SHIPPED | OUTPATIENT
Start: 2018-02-06 | End: 2018-05-17 | Stop reason: SDUPTHER

## 2018-02-06 RX ORDER — LISINOPRIL 10 MG/1
10 TABLET ORAL DAILY
Qty: 90 TABLET | Refills: 0 | Status: SHIPPED | OUTPATIENT
Start: 2018-02-06 | End: 2018-06-29 | Stop reason: SDUPTHER

## 2018-02-06 NOTE — TELEPHONE ENCOUNTER
From: Kait Marrero  Sent: 2/6/2018 3:23 PM EST  Subject: Medication Renewal Request    Mario Kwong. Tricia would like a refill of the following medications:  levothyroxine (SYNTHROID) 50 MCG tablet Laurence Kamaar MD]  lisinopril (PRINIVIL;ZESTRIL) 10 MG tablet Laurence Kamara MD]    Preferred pharmacy: [facility]    Comment:  I no longer have [facility] coverage. Primary is Medicare and secondary is [facility]. [facility] will be providing medication coverage. All scripts for medications need to be sent to [facility] @ [phone] or [email or web address] unless you have more current information. Thanks!  Dank Mcfarland

## 2018-04-19 ENCOUNTER — OFFICE VISIT (OUTPATIENT)
Dept: FAMILY MEDICINE CLINIC | Age: 68
End: 2018-04-19
Payer: MEDICARE

## 2018-04-19 VITALS
HEART RATE: 76 BPM | WEIGHT: 149.5 LBS | TEMPERATURE: 98 F | OXYGEN SATURATION: 98 % | DIASTOLIC BLOOD PRESSURE: 70 MMHG | RESPIRATION RATE: 10 BRPM | HEIGHT: 67 IN | SYSTOLIC BLOOD PRESSURE: 114 MMHG | BODY MASS INDEX: 23.46 KG/M2

## 2018-04-19 DIAGNOSIS — R93.89 ABNORMAL CHEST X-RAY: ICD-10-CM

## 2018-04-19 DIAGNOSIS — Z12.39 BREAST CANCER SCREENING: Primary | ICD-10-CM

## 2018-04-19 DIAGNOSIS — Z00.00 ROUTINE GENERAL MEDICAL EXAMINATION AT A HEALTH CARE FACILITY: ICD-10-CM

## 2018-04-19 PROCEDURE — G0438 PPPS, INITIAL VISIT: HCPCS | Performed by: FAMILY MEDICINE

## 2018-04-19 PROCEDURE — 4040F PNEUMOC VAC/ADMIN/RCVD: CPT | Performed by: FAMILY MEDICINE

## 2018-04-19 ASSESSMENT — LIFESTYLE VARIABLES
HOW MANY STANDARD DRINKS CONTAINING ALCOHOL DO YOU HAVE ON A TYPICAL DAY: 0
AUDIT TOTAL SCORE: 1
HOW OFTEN DURING THE LAST YEAR HAVE YOU HAD A FEELING OF GUILT OR REMORSE AFTER DRINKING: 0
HAVE YOU OR SOMEONE ELSE BEEN INJURED AS A RESULT OF YOUR DRINKING: 0
HOW OFTEN DO YOU HAVE SIX OR MORE DRINKS ON ONE OCCASION: 0
HOW OFTEN DO YOU HAVE A DRINK CONTAINING ALCOHOL: 1
AUDIT-C TOTAL SCORE: 1
HAS A RELATIVE, FRIEND, DOCTOR, OR ANOTHER HEALTH PROFESSIONAL EXPRESSED CONCERN ABOUT YOUR DRINKING OR SUGGESTED YOU CUT DOWN: 0
HOW OFTEN DURING THE LAST YEAR HAVE YOU NEEDED AN ALCOHOLIC DRINK FIRST THING IN THE MORNING TO GET YOURSELF GOING AFTER A NIGHT OF HEAVY DRINKING: 0
HOW OFTEN DURING THE LAST YEAR HAVE YOU FOUND THAT YOU WERE NOT ABLE TO STOP DRINKING ONCE YOU HAD STARTED: 0
HOW OFTEN DURING THE LAST YEAR HAVE YOU FAILED TO DO WHAT WAS NORMALLY EXPECTED FROM YOU BECAUSE OF DRINKING: 0
HOW OFTEN DURING THE LAST YEAR HAVE YOU BEEN UNABLE TO REMEMBER WHAT HAPPENED THE NIGHT BEFORE BECAUSE YOU HAD BEEN DRINKING: 0

## 2018-04-19 ASSESSMENT — PATIENT HEALTH QUESTIONNAIRE - PHQ9: SUM OF ALL RESPONSES TO PHQ QUESTIONS 1-9: 0

## 2018-04-19 ASSESSMENT — ANXIETY QUESTIONNAIRES: GAD7 TOTAL SCORE: 1

## 2018-05-16 ENCOUNTER — NURSE ONLY (OUTPATIENT)
Dept: FAMILY MEDICINE CLINIC | Age: 68
End: 2018-05-16
Payer: MEDICARE

## 2018-05-16 DIAGNOSIS — E03.9 HYPOTHYROIDISM, UNSPECIFIED TYPE: ICD-10-CM

## 2018-05-16 LAB
THYROXINE (T4): 8.8 UG/DL (ref 4.5–12)
TSH SERPL DL<=0.05 MIU/L-ACNC: 1.26 UIU/ML (ref 0.4–4.2)

## 2018-05-16 PROCEDURE — 36415 COLL VENOUS BLD VENIPUNCTURE: CPT | Performed by: FAMILY MEDICINE

## 2018-05-17 RX ORDER — CYCLOBENZAPRINE HCL 5 MG
5 TABLET ORAL 3 TIMES DAILY PRN
Qty: 30 TABLET | Refills: 1 | Status: SHIPPED | OUTPATIENT
Start: 2018-05-17 | End: 2019-07-24 | Stop reason: SDUPTHER

## 2018-05-17 RX ORDER — OMEPRAZOLE 40 MG/1
40 CAPSULE, DELAYED RELEASE ORAL DAILY
Qty: 90 CAPSULE | Refills: 4 | Status: SHIPPED | OUTPATIENT
Start: 2018-05-17 | End: 2019-04-16

## 2018-05-17 RX ORDER — LEVOTHYROXINE SODIUM 0.05 MG/1
TABLET ORAL
Qty: 90 TABLET | Refills: 1 | Status: SHIPPED | OUTPATIENT
Start: 2018-05-17 | End: 2018-10-19 | Stop reason: SDUPTHER

## 2018-05-22 ENCOUNTER — TELEPHONE (OUTPATIENT)
Dept: FAMILY MEDICINE CLINIC | Age: 68
End: 2018-05-22

## 2018-07-02 RX ORDER — LISINOPRIL 10 MG/1
10 TABLET ORAL DAILY
Qty: 90 TABLET | Refills: 0 | Status: SHIPPED | OUTPATIENT
Start: 2018-07-02 | End: 2018-10-19 | Stop reason: SDUPTHER

## 2018-07-14 ENCOUNTER — APPOINTMENT (OUTPATIENT)
Dept: GENERAL RADIOLOGY | Age: 68
End: 2018-07-14
Payer: MEDICARE

## 2018-07-14 ENCOUNTER — HOSPITAL ENCOUNTER (EMERGENCY)
Age: 68
Discharge: HOME | End: 2018-07-14
Attending: INTERNAL MEDICINE
Payer: MEDICARE

## 2018-07-14 VITALS
TEMPERATURE: 97.9 F | RESPIRATION RATE: 16 BRPM | DIASTOLIC BLOOD PRESSURE: 87 MMHG | HEART RATE: 76 BPM | HEIGHT: 67 IN | BODY MASS INDEX: 23.23 KG/M2 | SYSTOLIC BLOOD PRESSURE: 123 MMHG | OXYGEN SATURATION: 98 % | WEIGHT: 148 LBS

## 2018-07-14 DIAGNOSIS — T14.8XXA BITE BY ANIMAL: Primary | ICD-10-CM

## 2018-07-14 PROCEDURE — 6360000002 HC RX W HCPCS: Performed by: INTERNAL MEDICINE

## 2018-07-14 PROCEDURE — 90675 RABIES VACCINE IM: CPT | Performed by: INTERNAL MEDICINE

## 2018-07-14 PROCEDURE — 99283 EMERGENCY DEPT VISIT LOW MDM: CPT

## 2018-07-14 PROCEDURE — 73130 X-RAY EXAM OF HAND: CPT

## 2018-07-14 PROCEDURE — 90715 TDAP VACCINE 7 YRS/> IM: CPT | Performed by: INTERNAL MEDICINE

## 2018-07-14 PROCEDURE — 90471 IMMUNIZATION ADMIN: CPT | Performed by: INTERNAL MEDICINE

## 2018-07-14 PROCEDURE — 2500000003 HC RX 250 WO HCPCS: Performed by: INTERNAL MEDICINE

## 2018-07-14 PROCEDURE — 6370000000 HC RX 637 (ALT 250 FOR IP): Performed by: INTERNAL MEDICINE

## 2018-07-14 PROCEDURE — 90472 IMMUNIZATION ADMIN EACH ADD: CPT | Performed by: INTERNAL MEDICINE

## 2018-07-14 RX ORDER — AMOXICILLIN AND CLAVULANATE POTASSIUM 875; 125 MG/1; MG/1
1 TABLET, FILM COATED ORAL EVERY 12 HOURS SCHEDULED
Status: DISCONTINUED | OUTPATIENT
Start: 2018-07-14 | End: 2018-07-14

## 2018-07-14 RX ORDER — DOXYCYCLINE HYCLATE 100 MG
100 TABLET ORAL ONCE
Status: COMPLETED | OUTPATIENT
Start: 2018-07-14 | End: 2018-07-14

## 2018-07-14 RX ORDER — DOXYCYCLINE HYCLATE 100 MG/1
100 CAPSULE ORAL 2 TIMES DAILY
Qty: 20 CAPSULE | Refills: 0 | Status: SHIPPED | OUTPATIENT
Start: 2018-07-14 | End: 2018-07-24

## 2018-07-14 RX ORDER — AMOXICILLIN AND CLAVULANATE POTASSIUM 875; 125 MG/1; MG/1
1 TABLET, FILM COATED ORAL 2 TIMES DAILY
Qty: 20 TABLET | Refills: 0 | Status: SHIPPED | OUTPATIENT
Start: 2018-07-14 | End: 2018-07-14

## 2018-07-14 RX ADMIN — TETANUS TOXOID, REDUCED DIPHTHERIA TOXOID AND ACELLULAR PERTUSSIS VACCINE, ADSORBED 0.5 ML: 5; 2.5; 8; 8; 2.5 SUSPENSION INTRAMUSCULAR at 08:33

## 2018-07-14 RX ADMIN — DOXYCYCLINE HYCLATE 100 MG: 100 TABLET, COATED ORAL at 08:33

## 2018-07-14 RX ADMIN — RABIES IMMUNE GLOBULIN (HUMAN) 1350 UNITS: 300 INJECTION, SOLUTION INFILTRATION; INTRAMUSCULAR at 08:34

## 2018-07-14 RX ADMIN — RABIES VACCINE 1 ML: KIT at 08:33

## 2018-07-14 ASSESSMENT — ENCOUNTER SYMPTOMS
COUGH: 0
DIARRHEA: 0
NAUSEA: 0
WHEEZING: 0
VOMITING: 0
ABDOMINAL PAIN: 0
BACK PAIN: 0
RHINORRHEA: 0
EYE PAIN: 0
EYE DISCHARGE: 0
SHORTNESS OF BREATH: 0
SORE THROAT: 0

## 2018-07-14 ASSESSMENT — PAIN DESCRIPTION - DESCRIPTORS: DESCRIPTORS: ACHING;THROBBING

## 2018-07-14 ASSESSMENT — PAIN DESCRIPTION - LOCATION: LOCATION: FINGER (COMMENT WHICH ONE)

## 2018-07-14 ASSESSMENT — PAIN SCALES - GENERAL: PAINLEVEL_OUTOF10: 2

## 2018-07-14 ASSESSMENT — PAIN DESCRIPTION - PAIN TYPE: TYPE: ACUTE PAIN

## 2018-07-14 NOTE — ED PROVIDER NOTES
of her rabies. Patient was also advised to come back to the emergency department if her symptoms get worse. CRITICAL CARE:   none     CONSULTS:  none    PROCEDURES:  none     FINAL IMPRESSION      1. Bite by animal          DISPOSITION/PLAN   Discharge     PATIENT [facility]ED TO:  [facility]  [address] [id number] [phone]    Go in 2 days      [facility] [address] [address] EMERGENCY DEPT  [address] [address]  [phone]  Go in 1 day  If symptoms worsen      DISCHARGE MEDICATIONS:  Discharge Medication List as of 7/14/2018  8:31 AM      START taking these medications    Details   doxycycline hyclate (VIBRAMYCIN) 100 MG capsule Take 1 capsule by mouth 2 times daily for 10 days, Disp-20 capsule, R-0Print             (Please note that portions of this note were completed with a voice recognition program.  Efforts were made to edit the dictations but occasionally words are mis-transcribed.)    Scribe:  Shaun Santacruz 7/14/18 7:45 AM Scribing for and in the presence of Nena Steel MD.    Signed by: Shona Quinonez, 07/14/18 9:44 AM    Provider:  I personally performed the services described in the documentation, reviewed and edited the documentation which was dictated to the scribe in my presence, and it accurately records my words and actions.     Nena Steel MD 7/14/18 9:44 AM                          Nena Setel MD  07/14/18 [id number]

## 2018-07-17 ENCOUNTER — HOSPITAL ENCOUNTER (OUTPATIENT)
Dept: NURSING | Age: 68
Discharge: HOME | End: 2018-07-17
Payer: MEDICARE

## 2018-07-17 VITALS
DIASTOLIC BLOOD PRESSURE: 93 MMHG | TEMPERATURE: 97.7 F | RESPIRATION RATE: 18 BRPM | SYSTOLIC BLOOD PRESSURE: 138 MMHG | HEART RATE: 66 BPM

## 2018-07-17 PROCEDURE — 90471 IMMUNIZATION ADMIN: CPT | Performed by: INTERNAL MEDICINE

## 2018-07-17 PROCEDURE — 90675 RABIES VACCINE IM: CPT | Performed by: INTERNAL MEDICINE

## 2018-07-17 PROCEDURE — 6360000002 HC RX W HCPCS: Performed by: INTERNAL MEDICINE

## 2018-07-17 RX ORDER — CALCIUM CARBONATE 500(1250)
500 TABLET ORAL DAILY
COMMUNITY

## 2018-07-17 RX ORDER — ASCORBIC ACID 500 MG
500 TABLET ORAL DAILY
COMMUNITY

## 2018-07-17 RX ADMIN — RABIES VACCINE 1 ML: KIT at 07:08

## 2018-07-17 ASSESSMENT — PAIN - FUNCTIONAL ASSESSMENT: PAIN_FUNCTIONAL_ASSESSMENT: 0-10

## 2018-07-17 NOTE — PROGRESS NOTES
0823:  HERE FOR RABIES VACCINE. PT AMBULATORY. PROCESS EXPLAINED AND PT RIGHTS AND RESPONSIBILITIES OFFERED TO PT.    6267:  CALLED PHARMACY TO VERIFY DOSES OF RABIVERT NEEDED FOR FUTURE APPTS. SPOKE TO KRISTAL PEARCE [facility].  2594:  TOLERATED INJECTION WELL.   PT DISCHARGED AMBULATORY WITH INSTRUCTIONS.          _M___ Safety:       (Environmental)   [place] to environment   Ensure ID band is correct and in place/ allergy band as needed   Assess for fall risk   Initiate fall precautions as applicable (fall band, side rails, etc.)   Call light within reach   Bed in low position/ wheels locked    _M___ Pain:        Assess pain level and characteristics   Administer analgesics as ordered   Assess effectiveness of pain management and report to MD as needed    _M___ Knowledge Deficit:   Assess baseline knowledge   Provide teaching at level of understanding   Provide teaching via preferred learning method   Evaluate teaching effectiveness    _M___ Hemodynamic/Respiratory Status:       (Pre and Post Procedure Monitoring)   Assess/Monitor vital signs and LOC   Assess Baseline SpO2 prior to any sedation   Obtain weight/height   Assess vital signs/ LOC until patient meets discharge criteria   Monitor procedure site and notify MD of any issues    _

## 2018-07-26 ENCOUNTER — HOSPITAL ENCOUNTER (OUTPATIENT)
Dept: NURSING | Age: 68
Discharge: HOME | End: 2018-07-26
Payer: MEDICARE

## 2018-07-30 ENCOUNTER — HOSPITAL ENCOUNTER (OUTPATIENT)
Dept: NURSING | Age: 68
Discharge: HOME | End: 2018-07-30
Payer: MEDICARE

## 2018-07-30 VITALS
SYSTOLIC BLOOD PRESSURE: 136 MMHG | OXYGEN SATURATION: 99 % | HEART RATE: 85 BPM | DIASTOLIC BLOOD PRESSURE: 91 MMHG | RESPIRATION RATE: 18 BRPM

## 2018-07-30 PROCEDURE — 90471 IMMUNIZATION ADMIN: CPT | Performed by: INTERNAL MEDICINE

## 2018-07-30 PROCEDURE — 90675 RABIES VACCINE IM: CPT | Performed by: INTERNAL MEDICINE

## 2018-07-30 PROCEDURE — 6360000002 HC RX W HCPCS: Performed by: INTERNAL MEDICINE

## 2018-07-30 RX ADMIN — RABIES VACCINE 1 ML: KIT at 07:35

## 2018-07-30 ASSESSMENT — PAIN SCALES - GENERAL: PAINLEVEL_OUTOF10: 0

## 2018-07-30 NOTE — PROGRESS NOTES
0730 pt arrives ambulatory for rabies injection. Injection explained and questions answered. PT RIGHTS AND RESPONSIBILITIES OFFERED TO PT.  0735 injection given. Pt tolerated it well with no complaints. 0745 pt discharged ambulatory with instructions with no  Complaints.              __m__ Safety:       (Environmental)   [place] to environment   Ensure ID band is correct and in place/ allergy band as needed   Assess for fall risk   Initiate fall precautions as applicable (fall band, side rails, etc.)   Call light within reach   Bed in low position/ wheels locked    __m__ Pain:        Assess pain level and characteristics   Administer analgesics as ordered   Assess effectiveness of pain management and report to MD as needed    _m___ Knowledge Deficit:   Assess baseline knowledge   Provide teaching at level of understanding   Provide teaching via preferred learning method   Evaluate teaching effectiveness    _m___ Hemodynamic/Respiratory Status:       (Pre and Post Procedure Monitoring)   Assess/Monitor vital signs and LOC   Assess Baseline SpO2 prior to any sedation   Obtain weight/height   Assess vital signs/ LOC until patient meets discharge criteria   Monitor procedure site and notify MD of any issues

## 2018-07-31 ENCOUNTER — HOSPITAL ENCOUNTER (OUTPATIENT)
Age: 68
Discharge: HOME | End: 2018-07-31

## 2018-08-03 LAB — MUMPS IGG TITER: 3.08

## 2018-08-13 ENCOUNTER — HOSPITAL ENCOUNTER (OUTPATIENT)
Dept: NURSING | Age: 68
Discharge: HOME | End: 2018-08-13
Payer: MEDICARE

## 2018-08-13 VITALS
HEART RATE: 78 BPM | OXYGEN SATURATION: 100 % | DIASTOLIC BLOOD PRESSURE: 86 MMHG | TEMPERATURE: 97.1 F | SYSTOLIC BLOOD PRESSURE: 146 MMHG | RESPIRATION RATE: 16 BRPM

## 2018-08-13 PROCEDURE — 90471 IMMUNIZATION ADMIN: CPT | Performed by: INTERNAL MEDICINE

## 2018-08-13 PROCEDURE — 6360000002 HC RX W HCPCS: Performed by: INTERNAL MEDICINE

## 2018-08-13 PROCEDURE — 90675 RABIES VACCINE IM: CPT | Performed by: INTERNAL MEDICINE

## 2018-08-13 RX ADMIN — RABIES VACCINE 1 ML: KIT at 10:27

## 2018-08-13 NOTE — PROGRESS NOTES
9:58 AM pt arrives ot OPN for dose 4 of rabies vaccine. Denies questions and concerns  1000 PATIENT RIGHTS AND RESPONSIBILITIES SHEET OFFERED TO PT TO READ.   21  __m__ Safety:       (Environmental)   [place] to environment   Ensure ID band is correct and in place/ allergy band as needed   Assess for fall risk   Initiate fall precautions as applicable (fall band, side rails, etc.)   Call light within reach   Bed in low position/ wheels locked    __m__ Pain:        Assess pain level and characteristics   Administer analgesics as ordered   Assess effectiveness of pain management and report to MD as needed    _m___ Knowledge Deficit:   Assess baseline knowledge   Provide teaching at level of understanding   Provide teaching via preferred learning method   Evaluate teaching effectiveness    __m__ Hemodynamic/Respiratory Status:       (Pre and Post Procedure Monitoring)   Assess/Monitor vital signs and LOC   Assess Baseline SpO2 prior to any sedation   Obtain weight/height   Assess vital signs/ LOC until patient meets discharge criteria   Monitor procedure site and notify MD of any issues         1025 WRITTEN DISCHARGE INSTRUCTIONS GIVEN TO PT-VERBALIZES UNDERSTANDING    10:31 AM PT DISCHARGED AMBULATORY IN SATISFACTORY CONDITION

## 2018-10-05 ENCOUNTER — CARE COORDINATION (OUTPATIENT)
Dept: CASE MANAGEMENT | Age: 68
End: 2018-10-05

## 2018-10-19 DIAGNOSIS — E03.9 ACQUIRED HYPOTHYROIDISM: ICD-10-CM

## 2018-10-19 DIAGNOSIS — I10 ESSENTIAL HYPERTENSION: Primary | ICD-10-CM

## 2018-10-19 RX ORDER — LISINOPRIL 10 MG/1
10 TABLET ORAL DAILY
Qty: 30 TABLET | Refills: 0 | Status: SHIPPED | OUTPATIENT
Start: 2018-10-19 | End: 2018-10-31 | Stop reason: SDUPTHER

## 2018-10-19 RX ORDER — LEVOTHYROXINE SODIUM 0.05 MG/1
TABLET ORAL
Qty: 90 TABLET | Refills: 0 | Status: SHIPPED | OUTPATIENT
Start: 2018-10-19 | End: 2018-10-31 | Stop reason: SDUPTHER

## 2018-10-31 ENCOUNTER — OFFICE VISIT (OUTPATIENT)
Dept: FAMILY MEDICINE CLINIC | Age: 68
End: 2018-10-31
Payer: MEDICARE

## 2018-10-31 VITALS
WEIGHT: 149 LBS | TEMPERATURE: 97.6 F | SYSTOLIC BLOOD PRESSURE: 137 MMHG | HEART RATE: 75 BPM | BODY MASS INDEX: 23.39 KG/M2 | RESPIRATION RATE: 14 BRPM | HEIGHT: 67 IN | DIASTOLIC BLOOD PRESSURE: 90 MMHG

## 2018-10-31 DIAGNOSIS — J44.9 STAGE 1 MILD COPD BY GOLD CLASSIFICATION (HCC): ICD-10-CM

## 2018-10-31 DIAGNOSIS — Z12.31 ENCOUNTER FOR SCREENING MAMMOGRAM FOR BREAST CANCER: ICD-10-CM

## 2018-10-31 DIAGNOSIS — M51.36 LUMBAR DEGENERATIVE DISC DISEASE: ICD-10-CM

## 2018-10-31 DIAGNOSIS — I10 ESSENTIAL HYPERTENSION: Primary | ICD-10-CM

## 2018-10-31 DIAGNOSIS — E03.9 ACQUIRED HYPOTHYROIDISM: ICD-10-CM

## 2018-10-31 PROCEDURE — 4040F PNEUMOC VAC/ADMIN/RCVD: CPT | Performed by: FAMILY MEDICINE

## 2018-10-31 PROCEDURE — G8484 FLU IMMUNIZE NO ADMIN: HCPCS | Performed by: FAMILY MEDICINE

## 2018-10-31 PROCEDURE — G8420 CALC BMI NORM PARAMETERS: HCPCS | Performed by: FAMILY MEDICINE

## 2018-10-31 PROCEDURE — G8926 SPIRO NO PERF OR DOC: HCPCS | Performed by: FAMILY MEDICINE

## 2018-10-31 PROCEDURE — G8427 DOCREV CUR MEDS BY ELIG CLIN: HCPCS | Performed by: FAMILY MEDICINE

## 2018-10-31 PROCEDURE — 3023F SPIROM DOC REV: CPT | Performed by: FAMILY MEDICINE

## 2018-10-31 PROCEDURE — 1101F PT FALLS ASSESS-DOCD LE1/YR: CPT | Performed by: FAMILY MEDICINE

## 2018-10-31 PROCEDURE — 1123F ACP DISCUSS/DSCN MKR DOCD: CPT | Performed by: FAMILY MEDICINE

## 2018-10-31 PROCEDURE — 1036F TOBACCO NON-USER: CPT | Performed by: FAMILY MEDICINE

## 2018-10-31 PROCEDURE — 1090F PRES/ABSN URINE INCON ASSESS: CPT | Performed by: FAMILY MEDICINE

## 2018-10-31 PROCEDURE — G8400 PT W/DXA NO RESULTS DOC: HCPCS | Performed by: FAMILY MEDICINE

## 2018-10-31 PROCEDURE — 3017F COLORECTAL CA SCREEN DOC REV: CPT | Performed by: FAMILY MEDICINE

## 2018-10-31 PROCEDURE — 99214 OFFICE O/P EST MOD 30 MIN: CPT | Performed by: FAMILY MEDICINE

## 2018-10-31 RX ORDER — LEVOTHYROXINE SODIUM 0.05 MG/1
TABLET ORAL
Qty: 90 TABLET | Refills: 1 | Status: SHIPPED | OUTPATIENT
Start: 2018-10-31 | End: 2019-06-10 | Stop reason: SDUPTHER

## 2018-10-31 RX ORDER — HYDROCODONE BITARTRATE AND ACETAMINOPHEN 5; 325 MG/1; MG/1
1 TABLET ORAL EVERY 8 HOURS PRN
Qty: 30 TABLET | Refills: 0 | Status: CANCELLED | OUTPATIENT
Start: 2018-10-31

## 2018-10-31 RX ORDER — LISINOPRIL 10 MG/1
10 TABLET ORAL DAILY
Qty: 90 TABLET | Refills: 1 | Status: SHIPPED | OUTPATIENT
Start: 2018-10-31 | End: 2019-06-20 | Stop reason: SDUPTHER

## 2018-10-31 NOTE — PROGRESS NOTES
Chief Complaint   Patient presents with    Follow-up     medication refills       HPI:    Malorie العلي is a 79year old female here for follow-up of HTN, hyperlipidemia, hypothyroidism and lumbar disc disease. She denies any changes in her past medical, social or surgical history. Hypertension:  She is taking Lisinopril 10 mg 1 tablet PO daily and she denies any side effects from it. 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 She tries to be adherent to low salt diet. Her blood pressure is well controlled at home. She denies any chest pain, shortness of breath or edema. Cardiovascular risk factors: hypertension. Use of agents associated with hypertension: none. Hypothyroidism:   She is taking Synthroid 50 ug 1 tablet PO daily, on a empty stomach early in the morning, by itself. She denies any side effects from it. Last TSH was 1.4 in January 2017. Hyperlipidemia:   She is not taking any prescription medications. She is taking fish oil. In April 2017 her lab work showed a total cholesterol of 201, HDL 66, , Triglycerides 66. There is a family history of early ischemic heart disease. Her sister had a MI at age 64. She tries to be adherent to low cholesterol diet. She is not exercising regularly, but she is active as I mentioned above. Degenerative disc disease:  She has degenerative disc disease of the  lumbar spine diagnosed in 2009. She has done physical therapy. She takes sporadically Norco and muscle relaxants in average 2-3 times per year. MRI done in May 2009 showed multi-level degenerative dis disease, extruded disc at L2-3 extending above the disc space with moderate to severe left kateral recess and moderate left sided neural foraminal stenosis, left foraminal disc protrusion L3-L4 and L4-L5, right paracentral foraminal disc protrusion L5-S1    Mild COPD: diagnosed in 2015. Never been symptomatic. PFT was basically normal.  No using inhalers.   Denies

## 2018-11-01 ENCOUNTER — NURSE ONLY (OUTPATIENT)
Dept: FAMILY MEDICINE CLINIC | Age: 68
End: 2018-11-01
Payer: MEDICARE

## 2018-11-01 DIAGNOSIS — E03.9 ACQUIRED HYPOTHYROIDISM: ICD-10-CM

## 2018-11-01 DIAGNOSIS — I10 ESSENTIAL HYPERTENSION: ICD-10-CM

## 2018-11-01 LAB
ALBUMIN SERPL-MCNC: 4.2 G/DL (ref 3.5–5.1)
ALP BLD-CCNC: 125 U/L (ref 38–126)
ALT SERPL-CCNC: 13 U/L (ref 11–66)
ANION GAP SERPL CALCULATED.3IONS-SCNC: 11 MEQ/L (ref 8–16)
AST SERPL-CCNC: 18 U/L (ref 5–40)
BACTERIA: ABNORMAL
BASOPHILS # BLD: 0.8 %
BASOPHILS ABSOLUTE: 0.1 THOU/MM3 (ref 0–0.1)
BILIRUB SERPL-MCNC: 0.6 MG/DL (ref 0.3–1.2)
BILIRUBIN URINE: NEGATIVE
BLOOD, URINE: NEGATIVE
BUN BLDV-MCNC: 12 MG/DL (ref 7–22)
CALCIUM SERPL-MCNC: 9.9 MG/DL (ref 8.5–10.5)
CASTS: ABNORMAL /LPF
CASTS: ABNORMAL /LPF
CHARACTER, URINE: ABNORMAL
CHLORIDE BLD-SCNC: 102 MEQ/L (ref 98–111)
CHOLESTEROL, TOTAL: 227 MG/DL (ref 100–199)
CO2: 29 MEQ/L (ref 23–33)
COLOR: YELLOW
CREAT SERPL-MCNC: 0.9 MG/DL (ref 0.4–1.2)
CRYSTALS: ABNORMAL
EOSINOPHIL # BLD: 1.7 %
EOSINOPHILS ABSOLUTE: 0.1 THOU/MM3 (ref 0–0.4)
EPITHELIAL CELLS, UA: ABNORMAL /HPF
ERYTHROCYTE [DISTWIDTH] IN BLOOD BY AUTOMATED COUNT: 13 % (ref 11.5–14.5)
ERYTHROCYTE [DISTWIDTH] IN BLOOD BY AUTOMATED COUNT: 43 FL (ref 35–45)
GFR SERPL CREATININE-BSD FRML MDRD: 62 ML/MIN/1.73M2
GLUCOSE BLD-MCNC: 102 MG/DL (ref 70–108)
GLUCOSE, URINE: NEGATIVE MG/DL
HCT VFR BLD CALC: 41.8 % (ref 37–47)
HDLC SERPL-MCNC: 61 MG/DL
HEMOGLOBIN: 13.4 GM/DL (ref 12–16)
IMMATURE GRANS (ABS): 0.04 THOU/MM3 (ref 0–0.07)
IMMATURE GRANULOCYTES: 0.5 %
KETONES, URINE: NEGATIVE
LDL CHOLESTEROL CALCULATED: 137 MG/DL
LEUKOCYTE ESTERASE, URINE: ABNORMAL
LYMPHOCYTES # BLD: 25.2 %
LYMPHOCYTES ABSOLUTE: 2 THOU/MM3 (ref 1–4.8)
MCH RBC QN AUTO: 29.2 PG (ref 26–33)
MCHC RBC AUTO-ENTMCNC: 32.1 GM/DL (ref 32.2–35.5)
MCV RBC AUTO: 91.1 FL (ref 81–99)
MISCELLANEOUS LAB TEST RESULT: ABNORMAL
MONOCYTES # BLD: 8.2 %
MONOCYTES ABSOLUTE: 0.6 THOU/MM3 (ref 0.4–1.3)
NITRITE, URINE: NEGATIVE
NUCLEATED RED BLOOD CELLS: 0 /100 WBC
PH UA: 7.5
PLATELET # BLD: 444 THOU/MM3 (ref 130–400)
PMV BLD AUTO: 9.1 FL (ref 9.4–12.4)
POTASSIUM SERPL-SCNC: 3.9 MEQ/L (ref 3.5–5.2)
PROTEIN UA: NEGATIVE MG/DL
RBC # BLD: 4.59 MILL/MM3 (ref 4.2–5.4)
RBC URINE: ABNORMAL /HPF
RENAL EPITHELIAL, UA: ABNORMAL
SEG NEUTROPHILS: 63.6 %
SEGMENTED NEUTROPHILS ABSOLUTE COUNT: 5 THOU/MM3 (ref 1.8–7.7)
SODIUM BLD-SCNC: 142 MEQ/L (ref 135–145)
SPECIFIC GRAVITY UA: 1.01 (ref 1–1.03)
T4 FREE: 1.43 NG/DL (ref 0.93–1.76)
TOTAL PROTEIN: 7.9 G/DL (ref 6.1–8)
TRIGL SERPL-MCNC: 144 MG/DL (ref 0–199)
TSH SERPL DL<=0.05 MIU/L-ACNC: 2.63 UIU/ML (ref 0.4–4.2)
UROBILINOGEN, URINE: 0.2 EU/DL
VITAMIN B-12: 519 PG/ML (ref 211–911)
WBC # BLD: 7.8 THOU/MM3 (ref 4.8–10.8)
WBC UA: ABNORMAL /HPF
YEAST: ABNORMAL

## 2018-11-01 PROCEDURE — 36415 COLL VENOUS BLD VENIPUNCTURE: CPT | Performed by: FAMILY MEDICINE

## 2018-11-12 ENCOUNTER — TELEPHONE (OUTPATIENT)
Dept: FAMILY MEDICINE CLINIC | Age: 68
End: 2018-11-12

## 2018-11-12 DIAGNOSIS — I10 ESSENTIAL HYPERTENSION: ICD-10-CM

## 2018-11-12 DIAGNOSIS — E03.9 ACQUIRED HYPOTHYROIDISM: Primary | ICD-10-CM

## 2018-11-13 NOTE — TELEPHONE ENCOUNTER
Spoke with patient at work and she would like to call back Thursday to speak with clinical staff regarding results.

## 2018-12-08 ENCOUNTER — CARE COORDINATION (OUTPATIENT)
Dept: CARE COORDINATION | Age: 68
End: 2018-12-08

## 2018-12-13 ENCOUNTER — CARE COORDINATION (OUTPATIENT)
Dept: CARE COORDINATION | Age: 68
End: 2018-12-13

## 2019-01-04 ENCOUNTER — NURSE ONLY (OUTPATIENT)
Dept: FAMILY MEDICINE CLINIC | Age: 69
End: 2019-01-04
Payer: MEDICARE

## 2019-01-04 DIAGNOSIS — L60.9 NAIL DISORDER, UNSPECIFIED: ICD-10-CM

## 2019-01-04 LAB
ALT SERPL-CCNC: 11 U/L (ref 11–66)
AST SERPL-CCNC: 15 U/L (ref 5–40)

## 2019-01-04 PROCEDURE — 36415 COLL VENOUS BLD VENIPUNCTURE: CPT | Performed by: FAMILY MEDICINE

## 2019-01-05 LAB — CALCIUM IONIZED: NORMAL

## 2019-01-29 ENCOUNTER — NURSE ONLY (OUTPATIENT)
Dept: FAMILY MEDICINE CLINIC | Age: 69
End: 2019-01-29
Payer: MEDICARE

## 2019-01-29 DIAGNOSIS — R74.02 NONSPECIFIC ELEVATION OF LEVELS OF TRANSAMINASE OR LACTIC ACID DEHYDROGENASE (LDH): ICD-10-CM

## 2019-01-29 DIAGNOSIS — R74.01 NONSPECIFIC ELEVATION OF LEVELS OF TRANSAMINASE OR LACTIC ACID DEHYDROGENASE (LDH): ICD-10-CM

## 2019-01-29 LAB
ALT SERPL-CCNC: 10 U/L (ref 11–66)
AST SERPL-CCNC: 17 U/L (ref 5–40)
LD: 162 U/L (ref 100–190)

## 2019-01-29 PROCEDURE — 36415 COLL VENOUS BLD VENIPUNCTURE: CPT | Performed by: FAMILY MEDICINE

## 2019-03-27 ENCOUNTER — HOSPITAL ENCOUNTER (OUTPATIENT)
Dept: WOMENS IMAGING | Age: 69
Discharge: HOME | End: 2019-03-27
Payer: MEDICARE

## 2019-03-27 DIAGNOSIS — Z12.31 ENCOUNTER FOR SCREENING MAMMOGRAM FOR BREAST CANCER: ICD-10-CM

## 2019-03-27 PROCEDURE — 77063 BREAST TOMOSYNTHESIS BI: CPT

## 2019-04-11 ENCOUNTER — HOSPITAL ENCOUNTER (EMERGENCY)
Age: 69
Discharge: HOME | End: 2019-04-11
Payer: MEDICARE

## 2019-04-11 VITALS
BODY MASS INDEX: 23.78 KG/M2 | WEIGHT: 148 LBS | DIASTOLIC BLOOD PRESSURE: 84 MMHG | OXYGEN SATURATION: 98 % | SYSTOLIC BLOOD PRESSURE: 154 MMHG | HEIGHT: 66 IN | HEART RATE: 89 BPM | RESPIRATION RATE: 16 BRPM | TEMPERATURE: 98.9 F

## 2019-04-11 DIAGNOSIS — J10.1 INFLUENZA A: Primary | ICD-10-CM

## 2019-04-11 LAB
FLU A ANTIGEN: POSITIVE
FLU B ANTIGEN: NEGATIVE

## 2019-04-11 PROCEDURE — 99213 OFFICE O/P EST LOW 20 MIN: CPT | Performed by: NURSE PRACTITIONER

## 2019-04-11 PROCEDURE — 99213 OFFICE O/P EST LOW 20 MIN: CPT

## 2019-04-11 PROCEDURE — 87804 INFLUENZA ASSAY W/OPTIC: CPT

## 2019-04-11 RX ORDER — DEXTROMETHORPHAN HYDROBROMIDE AND PROMETHAZINE HYDROCHLORIDE 15; 6.25 MG/5ML; MG/5ML
5 SYRUP ORAL 4 TIMES DAILY PRN
Qty: 120 ML | Refills: 0 | Status: SHIPPED | OUTPATIENT
Start: 2019-04-11 | End: 2019-04-18

## 2019-04-11 ASSESSMENT — ENCOUNTER SYMPTOMS
SINUS PRESSURE: 0
NAUSEA: 0
COUGH: 1
TROUBLE SWALLOWING: 0
EYE PAIN: 0
DIARRHEA: 0
SINUS PAIN: 0
COLOR CHANGE: 0
EYE ITCHING: 0
VOMITING: 0
SORE THROAT: 1
EYE DISCHARGE: 0
RHINORRHEA: 1
SHORTNESS OF BREATH: 0

## 2019-04-11 NOTE — ED PROVIDER NOTES
[facility]  Urgent Care Encounter       CHIEF COMPLAINT       Chief Complaint   Patient presents with    Influenza     exsposure       Nurses Notes reviewed and I agree except as noted in the HPI. HISTORY OF PRESENT ILLNESS   Kimberly Hendricks is a 81 y.o. female who presents to the urgent care for complaints of influenza-like symptoms. The patient was exposed over the weekend. She still experiencing fever, chills, body aches, cough, congestion. She denies shortness of breath, wheezing, nausea, vomiting, diarrhea, chest pain. HPI    REVIEW OF SYSTEMS     Review of Systems   Constitutional: Positive for activity change, chills, fatigue and fever. Negative for appetite change. HENT: Positive for congestion, postnasal drip, rhinorrhea and sore throat. Negative for sinus pressure, sinus pain and trouble swallowing. Eyes: Negative for pain, discharge and itching. Respiratory: Positive for cough. Negative for shortness of breath. Cardiovascular: Negative for chest pain. Gastrointestinal: Negative for diarrhea, nausea and vomiting. Genitourinary: Negative for difficulty urinating. Musculoskeletal: Positive for myalgias. Negative for arthralgias. Skin: Negative for color change, pallor, rash and wound. Neurological: Negative for dizziness and headaches. Psychiatric/Behavioral: Negative for agitation.        PAST MEDICAL HISTORY         Diagnosis Date    Allergic rhinitis     Arthritis     left thumb    COPD (chronic obstructive pulmonary disease) ([facility] [address] 2015    GERD (gastroesophageal reflux disease)     HTN (hypertension) January 2013    Hyperlipemia 2004    Hypothyroid 2009    Levothyroxine    IBS (irritable bowel syndrome)     Knee cap dislocation     broke right knee    Multilevel degenerative disc disease 2009    Takes [address] off and on as needed    Osteoarthritis 1982    Osteopenia 2008    PUD (peptic ulcer disease) 2009    Recurrent low back pain 1992 disc disease       SURGICALHISTORY     Patient  has a past surgical history that includes Dental surgery (); Bunionectomy (); nancy and bso (cervix removed) (); laparoscopy (); Colonoscopy (); Knee arthroscopy (); Skin cancer excision (13); and Hysterectomy (). CURRENT MEDICATIONS       Discharge Medication List as of 2019  8:14 PM      CONTINUE these medications which have NOT CHANGED    Details   levothyroxine (SYNTHROID) 50 MCG tablet TAKE 1 TABLET DAILY, Disp-90 tablet, R-1Normal      lisinopril (PRINIVIL;ZESTRIL) 10 MG tablet Take 1 tablet by mouth daily, Disp-90 tablet, R-1Normal      aspirin 81 MG tablet Take 81 mg by mouth dailyHistorical Med      calcium carbonate (OSCAL) 500 MG TABS tablet Take 500 mg by mouth dailyHistorical Med      Cholecalciferol (D3 VITAMIN PO) Take by mouth dailyHistorical Med      vitamin C (ASCORBIC ACID) 500 MG tablet Take 500 mg by mouth dailyHistorical Med      cyclobenzaprine (FLEXERIL) 5 MG tablet Take 1 tablet by mouth 3 times daily as needed (back pain) Rx  09, Disp-30 tablet, R-1Normal      omeprazole (PRILOSEC) 40 MG delayed release capsule Take 1 capsule by mouth daily, Disp-90 capsule, R-4Normal      Krill Oil 500 MG CAPS Take 1 tablet by mouth dailyHistorical Med      HYDROcodone-acetaminophen (NORCO) 5-325 MG per tablet Take 1 tablet by mouth every 8 hours as needed RX  2011, Disp-30 tablet, R-0      Loperamide HCl (IMODIUM PO) Take  by mouth daily as needed. Blood Pressure KIT Starting 2013, Until Discontinued, Disp-1 kit, R-0, Normal             ALLERGIES     Patient is is allergic to amoxicillin; darvon [propoxyphene hcl]; seasonal; and sudafed [pseudoephedrine hcl].     Patients   Immunization History   Administered Date(s) Administered    FLUZONE 3 YEARS AND OVER 2013    Influenza Vaccine, unspecified formulation 10/01/2016    Influenza Virus Vaccine 2015, 10/13/2017, 10/16/2018 Posterior oropharyngeal erythema present. Cardiovascular: Normal rate. Pulmonary/Chest: Effort normal and breath sounds normal.   Neurological: She is alert and oriented to person, place, and time. Skin: Skin is warm and dry. No rash noted. Nursing note and vitals reviewed. DIAGNOSTIC RESULTS     Labs:  Results for orders placed or performed during the hospital encounter of 04/11/19   Rapid influenza A/B antigens   Result Value Ref Range    Flu A Antigen POSITIVE (A) NEGATIVE    Flu B Antigen NEGATIVE NEGATIVE       IMAGING:    No orders to display         EKG:      URGENT CARE COURSE:     Vitals:    04/1950   BP: (!) 154/84   Pulse: 89   Resp: 16   Temp: 98.9 °F (37.2 °C)   TempSrc: Temporal   SpO2: 98%   Weight: 148 lb (67.1 kg)   Height: 5' 6\" (1.676 m)       Medications - No data to display         PROCEDURES:  None    FINAL IMPRESSION      1. Influenza A          DISPOSITION/ PLAN     The patient's physical exam and laboratory studies are consistent with influenza A. She will be treated with promethazine DM. She is to use Tylenol and Motrin for pain and fever. The patient to take the medication as prescribed/directed. The patient is to follow-up with their PCP in 2-3 days. They are to go to the ER for any worsening symptoms. The patient/caregiver were agreeable to this plan of care and voiced no concerns at time of discharge. The patient was ambulatory out of the department in stable condition.       PATIENT REFERRED TO:  [facility] ELAINE HERNANDEZ AM [facility] [address]      DISCHARGE MEDICATIONS:  Discharge Medication List as of 4/11/2019  8:14 PM      START taking these medications    Details   promethazine-dextromethorphan (PROMETHAZINE-DM) 6.25-15 MG/5ML syrup Take 5 mLs by mouth 4 times daily as needed for Cough, Disp-120 mL, R-0Normal             Discharge Medication List as of 4/11/2019  8:14 PM          Discharge Medication List as of 4/11/2019  8:14 PM          Sam Downey

## 2019-04-11 NOTE — ED TRIAGE NOTES
Gertrude Ramos arrives by self  to room with complaint of exposure to flu on Sunday yesterday she had a fever with some body aches.

## 2019-04-16 ENCOUNTER — OFFICE VISIT (OUTPATIENT)
Dept: FAMILY MEDICINE CLINIC | Age: 69
End: 2019-04-16
Payer: MEDICARE

## 2019-04-16 VITALS
HEIGHT: 66 IN | SYSTOLIC BLOOD PRESSURE: 136 MMHG | BODY MASS INDEX: 23.78 KG/M2 | RESPIRATION RATE: 16 BRPM | HEART RATE: 72 BPM | TEMPERATURE: 98 F | WEIGHT: 148 LBS | DIASTOLIC BLOOD PRESSURE: 84 MMHG

## 2019-04-16 DIAGNOSIS — J44.9 CHRONIC OBSTRUCTIVE PULMONARY DISEASE, UNSPECIFIED COPD TYPE (HCC): ICD-10-CM

## 2019-04-16 DIAGNOSIS — J10.1 INFLUENZA A: Primary | ICD-10-CM

## 2019-04-16 PROCEDURE — 3017F COLORECTAL CA SCREEN DOC REV: CPT | Performed by: NURSE PRACTITIONER

## 2019-04-16 PROCEDURE — G8427 DOCREV CUR MEDS BY ELIG CLIN: HCPCS | Performed by: NURSE PRACTITIONER

## 2019-04-16 PROCEDURE — 1123F ACP DISCUSS/DSCN MKR DOCD: CPT | Performed by: NURSE PRACTITIONER

## 2019-04-16 PROCEDURE — 3023F SPIROM DOC REV: CPT | Performed by: NURSE PRACTITIONER

## 2019-04-16 PROCEDURE — G8926 SPIRO NO PERF OR DOC: HCPCS | Performed by: NURSE PRACTITIONER

## 2019-04-16 PROCEDURE — 99214 OFFICE O/P EST MOD 30 MIN: CPT | Performed by: NURSE PRACTITIONER

## 2019-04-16 PROCEDURE — G8400 PT W/DXA NO RESULTS DOC: HCPCS | Performed by: NURSE PRACTITIONER

## 2019-04-16 PROCEDURE — 1036F TOBACCO NON-USER: CPT | Performed by: NURSE PRACTITIONER

## 2019-04-16 PROCEDURE — 1090F PRES/ABSN URINE INCON ASSESS: CPT | Performed by: NURSE PRACTITIONER

## 2019-04-16 PROCEDURE — G8420 CALC BMI NORM PARAMETERS: HCPCS | Performed by: NURSE PRACTITIONER

## 2019-04-16 PROCEDURE — 4040F PNEUMOC VAC/ADMIN/RCVD: CPT | Performed by: NURSE PRACTITIONER

## 2019-04-16 ASSESSMENT — ENCOUNTER SYMPTOMS
DIARRHEA: 0
COUGH: 1
WHEEZING: 0
SHORTNESS OF BREATH: 0
NAUSEA: 0
SORE THROAT: 0
CONSTIPATION: 0
ABDOMINAL PAIN: 0

## 2019-04-16 ASSESSMENT — PATIENT HEALTH QUESTIONNAIRE - PHQ9
1. LITTLE INTEREST OR PLEASURE IN DOING THINGS: 0
SUM OF ALL RESPONSES TO PHQ9 QUESTIONS 1 & 2: 0
SUM OF ALL RESPONSES TO PHQ QUESTIONS 1-9: 0
SUM OF ALL RESPONSES TO PHQ QUESTIONS 1-9: 0
2. FEELING DOWN, DEPRESSED OR HOPELESS: 0

## 2019-04-16 NOTE — LETTER
[address] [facility]  Phone: [phone]  Fax: [phone]    Stevie Cogan, APRN - CNP        April 16, 2019     Patient: Jose Alejandro Buenrostro   YOB: 1950   Date of Visit: 4/16/2019       To Whom it May Concern:    Lenard Raymond was seen in my clinic on 4/16/2019. She may return to work on April 17, 2019. If you have any questions or concerns, please don't hesitate to call.     Sincerely,       Electronically signed by Stevie Cogan, APRN - CNP on 4/16/2019 at 3:03 PM

## 2019-04-16 NOTE — PATIENT INSTRUCTIONS
Patient Education        Influenza (Flu): Care Instructions  Your Care Instructions    Influenza (flu) is an infection in the lungs and breathing passages. It is caused by the influenza virus. There are different strains, or types, of the flu virus from year to year. Unlike the common cold, the flu comes on suddenly and the symptoms, such as a cough, congestion, fever, chills, fatigue, aches, and pains, are more severe. These symptoms may last up to 10 days. Although the flu can make you feel very sick, it usually doesn't cause serious health problems. Home treatment is usually all you need for flu symptoms. But your doctor may prescribe antiviral medicine to prevent other health problems, such as pneumonia, from developing. Older people and those who have a long-term health condition, such as lung disease, are most at risk for having pneumonia or other health problems. Follow-up care is a key part of your treatment and safety. Be sure to make and go to all appointments, and call your doctor if you are having problems. It's also a good idea to know your test results and keep a list of the medicines you take. How can you care for yourself at home? · Get plenty of rest.  · Drink plenty of fluids, enough so that your urine is light yellow or clear like water. If you have kidney, heart, or liver disease and have to limit fluids, talk with your doctor before you increase the amount of fluids you drink. · Take an over-the-counter pain medicine if needed, such as acetaminophen (Tylenol), ibuprofen (Advil, Motrin), or naproxen (Aleve), to relieve fever, headache, and muscle aches. Read and follow all instructions on the label. No one younger than 20 should take aspirin. It has been linked to Reye syndrome, a serious illness. · Do not smoke. Smoking can make the flu worse. If you need help quitting, talk to your doctor about stop-smoking programs and medicines.  These can increase your chances of quitting for your doctor if:    · You begin to get better and then get worse.     · You are not getting better after 1 week. Where can you learn more? Go to https://[employer]web.[email or web address]. org and sign in to your [employer] account. Enter SONIA in the [facility] box to learn more about \"Influenza (Flu): Care Instructions. \"     If you do not have an account, please click on the \"Sign Up Now\" link. Current as of: September 5, 2018  Content Version: 11.9  © 0353-5702 [employer]. Care instructions adapted under license by [facility] ([facility]). If you have questions about a medical condition or this instruction, always ask your healthcare professional. [address] any warranty or liability for your use of this information.

## 2019-04-16 NOTE — PROGRESS NOTES
[address] [facility],[address] FAMILY [address] [address]  Dept: [phone]  Dept Fax: [phone]  Loc: [phone]       KIRAN Torres is a 81 y. o.female here for follow-up after being diagnosed with influenza A 5 days ago. 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 She continues to have a lot of coughing and congestion, no fever, but feels quite weak and has three 12 hour work days ahead of her. Denies nausea, vomiting, diarrhea, chest pain or neck pain. Patient COPD as been stable and she has not had any exacerbations.      Patient Active Problem List   Diagnosis    Hyperlipemia    PUD (peptic ulcer disease)    Osteoarthritis    Osteopenia    Hypothyroid    Obstructive sleep apnea    Daytime somnolence    Restless sleeper    Fatigue    Difficulty sleeping    Morning headache    Hypertension    Arthritis    Seasonal allergies    Chest pain    COPD (chronic obstructive pulmonary disease) (HCC)    Hypothyroidism    Chest pain, atypical    Intermittent palpitations    Dizziness for yrs on getting up- once in a while    GERD (gastroesophageal reflux disease)       Current Outpatient Medications   Medication Sig Dispense Refill    promethazine-dextromethorphan (PROMETHAZINE-DM) 6.25-15 MG/5ML syrup Take 5 mLs by mouth 4 times daily as needed for Cough 120 mL 0    levothyroxine (SYNTHROID) 50 MCG tablet TAKE 1 TABLET DAILY 90 tablet 1    lisinopril (PRINIVIL;ZESTRIL) 10 MG tablet Take 1 tablet by mouth daily 90 tablet 1    aspirin 81 MG tablet Take 81 mg by mouth daily      calcium carbonate (OSCAL) 500 MG TABS tablet Take 500 mg by mouth daily      Cholecalciferol (D3 VITAMIN PO) Take by mouth daily      vitamin C (ASCORBIC ACID) 500 MG tablet Take 500 mg by mouth daily      cyclobenzaprine (FLEXERIL) 5 MG tablet Take 1 tablet by mouth 3 times daily as needed (back pain) Rx  09 30 tablet 1    Krill Oil 500 MG CAPS Take 1 tablet by mouth daily      Loperamide HCl (IMODIUM PO) Take  by mouth daily as needed.  Blood Pressure KIT by Does not apply route. 1 kit 0     No current facility-administered medications for this visit. Review of Systems   Constitutional: Positive for fatigue. Negative for appetite change, diaphoresis and fever. HENT: Positive for congestion. Negative for sore throat and tinnitus. Eyes: Negative for visual disturbance. Respiratory: Positive for cough. Negative for shortness of breath and wheezing. Cardiovascular: Negative for chest pain and leg swelling. Gastrointestinal: Negative for abdominal pain, constipation, diarrhea and nausea. Genitourinary: Negative for frequency. Musculoskeletal: Negative for arthralgias, myalgias and neck stiffness. Skin: Negative for rash. Neurological: Negative for dizziness, weakness and headaches. Psychiatric/Behavioral: The patient is not nervous/anxious. All other systems reviewed and are negative. OBJECTIVE     /84   Pulse 72   Temp 98 °F (36.7 °C) (Oral)   Resp 16   Ht 5' 6\" (1.676 m)   Wt 148 lb (67.1 kg)   BMI 23.89 kg/m²     Wt Readings from Last 3 Encounters:   19 148 lb (67.1 kg)   19 148 lb (67.1 kg)   10/31/18 149 lb (67.6 kg)     Body mass index is 23.89 kg/m². Physical Exam   Constitutional: She is oriented to person, place, and time. She appears well-developed and well-nourished. She is cooperative. No distress. HENT:   Right Ear: Hearing, tympanic membrane, external ear and ear canal normal.   Left Ear: Hearing, tympanic membrane, external ear and ear canal normal.   Nose: Nose normal. No mucosal edema or rhinorrhea. Mouth/Throat: Uvula is midline, oropharynx is clear and moist and mucous membranes are normal. No uvula swelling.  No oropharyngeal exudate, posterior oropharyngeal edema or posterior 9.9 11/01/2018    PROT 7.9 11/01/2018    LABALBU 4.2 11/01/2018    BILITOT 0.6 11/01/2018    ALKPHOS 125 11/01/2018    AST 17 01/29/2019    ALT 10 (L) 01/29/2019    LABGLOM 62 (A) 11/01/2018       No results found for: Randy Valladares [id number]    Lab Results   Component Value Date    TSH 2.630 11/01/2018       Lab Results   Component Value Date    WBC 7.8 11/01/2018    HGB 13.4 11/01/2018    HCT 41.8 11/01/2018    MCV 91.1 11/01/2018     (H) 11/01/2018       No results found for: PSA, PSADIA    Immunization History   Administered Date(s) Administered    FLUZONE 3 YEARS AND OVER 09/04/2013    Influenza Vaccine, unspecified formulation 10/01/2016    Influenza Virus Vaccine 12/01/2015, 10/13/2017, 10/16/2018    Pneumococcal 13-valent Conjugate ([id number]) 01/14/2015    Pneumococcal Polysaccharide ([id number]) 01/11/2017    Rabies 07/14/2018, 07/17/2018, 07/30/2018, 08/13/2018    Tdap (Boostrix, Adacel) 08/31/2012, 07/14/2018       Health Maintenance   Topic Date Due    Shingles Vaccine (1 of 2) 02/04/2002    TSH testing  11/01/2019    Potassium monitoring  11/01/2019    Creatinine monitoring  11/01/2019    Breast cancer screen  03/27/2021    Pneumococcal 65+ years Vaccine (2 of 2 - PPSV23) 01/11/2022    Lipid screen  11/01/2023    Colon cancer screen colonoscopy  07/14/2024    DTaP/Tdap/Td vaccine (3 - Td) 07/14/2028    DEXA (modify frequency per FRAX score)  Completed    Flu vaccine  Completed    Hepatitis C screen  Addressed       Future Appointments   Date Time Provider Lucas Perez   4/25/2019 10:30 AM Kay[facility] Huerta [facility] [id number]      Patient is nontoxic in appearance with easy respirations and clear lungs. Her skin is mildly pale and clammy and she does appear tired. The rest of her exam is unremarkable. Diagnosis Orders   1. Influenza A     2.  Chronic obstructive pulmonary disease, unspecified COPD type ([facility] stable        PLAN      Patient is given today and tomorrow off of work and she is to call in if she has any further need for time off for follow-up treatment.   Electronically signed by LEFTY Estrada CNP on 4/16/2019 at 3:11 PM

## 2019-06-11 RX ORDER — LEVOTHYROXINE SODIUM 0.05 MG/1
TABLET ORAL
Qty: 90 TABLET | Refills: 1 | Status: SHIPPED | OUTPATIENT
Start: 2019-06-11 | End: 2019-07-24 | Stop reason: SDUPTHER

## 2019-06-11 NOTE — TELEPHONE ENCOUNTER
Last visit- 4/16/2019  Next visit- 6/26/2019    Requested Prescriptions     Pending Prescriptions Disp Refills    levothyroxine (SYNTHROID) 50 MCG tablet 90 tablet 1     Sig: TAKE 1 TABLET DAILY

## 2019-06-20 RX ORDER — LISINOPRIL 10 MG/1
10 TABLET ORAL DAILY
Qty: 90 TABLET | Refills: 1 | Status: SHIPPED | OUTPATIENT
Start: 2019-06-20 | End: 2019-07-24 | Stop reason: SDUPTHER

## 2019-07-24 ENCOUNTER — OFFICE VISIT (OUTPATIENT)
Dept: FAMILY MEDICINE CLINIC | Age: 69
End: 2019-07-24
Payer: MEDICARE

## 2019-07-24 VITALS
WEIGHT: 151 LBS | RESPIRATION RATE: 12 BRPM | SYSTOLIC BLOOD PRESSURE: 132 MMHG | HEIGHT: 67 IN | BODY MASS INDEX: 23.7 KG/M2 | DIASTOLIC BLOOD PRESSURE: 86 MMHG | HEART RATE: 60 BPM

## 2019-07-24 DIAGNOSIS — Z00.00 ROUTINE GENERAL MEDICAL EXAMINATION AT A HEALTH CARE FACILITY: Primary | ICD-10-CM

## 2019-07-24 DIAGNOSIS — Z13.1 DIABETES MELLITUS SCREENING: ICD-10-CM

## 2019-07-24 PROCEDURE — 3017F COLORECTAL CA SCREEN DOC REV: CPT | Performed by: FAMILY MEDICINE

## 2019-07-24 PROCEDURE — 1123F ACP DISCUSS/DSCN MKR DOCD: CPT | Performed by: FAMILY MEDICINE

## 2019-07-24 PROCEDURE — G0439 PPPS, SUBSEQ VISIT: HCPCS | Performed by: FAMILY MEDICINE

## 2019-07-24 PROCEDURE — 4040F PNEUMOC VAC/ADMIN/RCVD: CPT | Performed by: FAMILY MEDICINE

## 2019-07-24 RX ORDER — CYCLOBENZAPRINE HCL 5 MG
5 TABLET ORAL 3 TIMES DAILY PRN
Qty: 90 TABLET | Refills: 0 | Status: SHIPPED | OUTPATIENT
Start: 2019-07-24 | End: 2021-12-13

## 2019-07-24 RX ORDER — LISINOPRIL 10 MG/1
10 TABLET ORAL DAILY
Qty: 90 TABLET | Refills: 1 | Status: SHIPPED | OUTPATIENT
Start: 2019-07-24 | End: 2019-10-23 | Stop reason: SDUPTHER

## 2019-07-24 RX ORDER — LEVOTHYROXINE SODIUM 0.05 MG/1
TABLET ORAL
Qty: 90 TABLET | Refills: 1 | Status: SHIPPED | OUTPATIENT
Start: 2019-07-24 | End: 2019-10-23 | Stop reason: SDUPTHER

## 2019-07-24 ASSESSMENT — LIFESTYLE VARIABLES
HAS A RELATIVE, FRIEND, DOCTOR, OR ANOTHER HEALTH PROFESSIONAL EXPRESSED CONCERN ABOUT YOUR DRINKING OR SUGGESTED YOU CUT DOWN: 0
HOW OFTEN DURING THE LAST YEAR HAVE YOU FAILED TO DO WHAT WAS NORMALLY EXPECTED FROM YOU BECAUSE OF DRINKING: 0
AUDIT-C TOTAL SCORE: 1
HOW OFTEN DURING THE LAST YEAR HAVE YOU NEEDED AN ALCOHOLIC DRINK FIRST THING IN THE MORNING TO GET YOURSELF GOING AFTER A NIGHT OF HEAVY DRINKING: 0
HOW OFTEN DURING THE LAST YEAR HAVE YOU HAD A FEELING OF GUILT OR REMORSE AFTER DRINKING: 0
HOW OFTEN DURING THE LAST YEAR HAVE YOU FOUND THAT YOU WERE NOT ABLE TO STOP DRINKING ONCE YOU HAD STARTED: 0
HOW OFTEN DO YOU HAVE A DRINK CONTAINING ALCOHOL: 1
HOW OFTEN DO YOU HAVE SIX OR MORE DRINKS ON ONE OCCASION: 0
HOW OFTEN DURING THE LAST YEAR HAVE YOU BEEN UNABLE TO REMEMBER WHAT HAPPENED THE NIGHT BEFORE BECAUSE YOU HAD BEEN DRINKING: 0
HOW MANY STANDARD DRINKS CONTAINING ALCOHOL DO YOU HAVE ON A TYPICAL DAY: 0
HAVE YOU OR SOMEONE ELSE BEEN INJURED AS A RESULT OF YOUR DRINKING: 0
AUDIT TOTAL SCORE: 1

## 2019-07-24 ASSESSMENT — PATIENT HEALTH QUESTIONNAIRE - PHQ9
SUM OF ALL RESPONSES TO PHQ QUESTIONS 1-9: 0
SUM OF ALL RESPONSES TO PHQ QUESTIONS 1-9: 0

## 2019-07-24 NOTE — PATIENT INSTRUCTIONS
advice. · Think about these questions when you prepare an advance directive:  ? Who do you want to make decisions about your medical care if you are not able to? Many people choose a family member or close friend. ? Do you know enough about life support methods that might be used? If not, talk to your doctor so you understand. ? What are you most afraid of that might happen? You might be afraid of having pain, losing your independence, or being kept alive by machines. ? Where would you prefer to die? Choices include your home, a hospital, or a nursing home. ? Would you like to have information about hospice care to support you and your family? ? Do you want to donate organs when you die? ? Do you want certain [employer] practices performed before you die? If so, put your wishes in the advance directive. · Read your advance directive every year, and make changes as needed. When should you call for help? Be sure to contact your doctor if you have any questions. Where can you learn more? Go to [email or web address] org and sign in to your [employer] account. Enter R264 in the [employer] box to learn more about \"Advance Directives: Care Instructions. \"     If you do not have an account, please click on the \"Sign Up Now\" link. Current as of: April 18, 2018  Content Version: 12.0  © 8336-1392 Healthwise, Incorporated. Care instructions adapted under license by [facility] ([facility]). If you have questions about a medical condition or this instruction, always ask your healthcare professional. [place] [address] any warranty or liability for your use of this information. Learning About Durable Power of  for Health Care  What is a durable power of  for health care? A durable power of  for health care is one type of the legal forms called advance directives.  It lets you decide who you want to make treatment decisions for you if you cannot speak or

## 2019-07-24 NOTE — PROGRESS NOTES
Advance Care Planning   Advanced Care Planning: Discussed the patients choices for care and treatment in case of a health event that adversely affects decision-making abilities. Also discussed the patients long-term treatment options. Reviewed with the patient the [address] [place] [address] & [facility] Declaration forms  Reviewed the process of designating a competent adult as an Agent (or -in-fact) that could take make health care decisions for the patient if incompetent. Patient was asked to complete the declaration forms, either acknowledge the forms by a public notary or an eligible witness and provide a signed copy to the practice office. Time spent (minutes): 2        Cardiovascular Disease Risk Counseling: Assessed the patient's risk to develop cardiovascular disease and reviewed main risk factors. Reviewed steps to reduce disease risk including:     · Making healthy food choices  · Being physically active and gradualy increasing activity levels   · Monitor blood pressure and treat if higher than 140/90 mmHg  · Maintain blood total cholesterol levels under 5 mmol/l or 190 mg/dl  · Maintain LDL cholesterol levels under 3.0 mmol/l or 115 mg/dl   · Control blood glucose levels    Provided a follow up plan. Time spent (minutes): 3      Medicare Annual Wellness Visit    Name: Jaycee Avitia Date: 2019   MRN: [id number] Sex: Female   Age: 81 y.o. Ethnicity: Non-/Non    : 1950 Race: Margarita Lo is here for Medicare AWV    Screenings for behavioral, psychosocial and functional/safety risks, and cognitive dysfunction are all negative except as indicated below. These results, as well as other patient data from the [address] form, are documented in Flowsheets linked to this Encounter. Allergies   Allergen Reactions    Amoxicillin Diarrhea    Darvon [Propoxyphene Hcl] Other (See Comments)     Feeling of extremity numbness.    

## 2019-08-02 ENCOUNTER — OFFICE VISIT (OUTPATIENT)
Dept: FAMILY MEDICINE CLINIC | Age: 69
End: 2019-08-02
Payer: MEDICARE

## 2019-08-02 VITALS
BODY MASS INDEX: 23.78 KG/M2 | RESPIRATION RATE: 16 BRPM | SYSTOLIC BLOOD PRESSURE: 120 MMHG | HEART RATE: 72 BPM | TEMPERATURE: 97.7 F | WEIGHT: 148 LBS | DIASTOLIC BLOOD PRESSURE: 72 MMHG | HEIGHT: 66 IN

## 2019-08-02 DIAGNOSIS — N30.01 ACUTE CYSTITIS WITH HEMATURIA: ICD-10-CM

## 2019-08-02 DIAGNOSIS — R30.0 DYSURIA: Primary | ICD-10-CM

## 2019-08-02 LAB
BILIRUBIN, POC: ABNORMAL
BLOOD URINE, POC: ABNORMAL
CLARITY, POC: ABNORMAL
COLOR, POC: YELLOW
GLUCOSE URINE, POC: ABNORMAL
KETONES, POC: ABNORMAL
LEUKOCYTE EST, POC: ABNORMAL
NITRITE, POC: ABNORMAL
PH, POC: 7
PROTEIN, POC: 30
SPECIFIC GRAVITY, POC: 1.02
UROBILINOGEN, POC: 0.2

## 2019-08-02 PROCEDURE — 1090F PRES/ABSN URINE INCON ASSESS: CPT | Performed by: NURSE PRACTITIONER

## 2019-08-02 PROCEDURE — 3017F COLORECTAL CA SCREEN DOC REV: CPT | Performed by: NURSE PRACTITIONER

## 2019-08-02 PROCEDURE — 4040F PNEUMOC VAC/ADMIN/RCVD: CPT | Performed by: NURSE PRACTITIONER

## 2019-08-02 PROCEDURE — G8400 PT W/DXA NO RESULTS DOC: HCPCS | Performed by: NURSE PRACTITIONER

## 2019-08-02 PROCEDURE — 1036F TOBACCO NON-USER: CPT | Performed by: NURSE PRACTITIONER

## 2019-08-02 PROCEDURE — 81003 URINALYSIS AUTO W/O SCOPE: CPT | Performed by: NURSE PRACTITIONER

## 2019-08-02 PROCEDURE — 1123F ACP DISCUSS/DSCN MKR DOCD: CPT | Performed by: NURSE PRACTITIONER

## 2019-08-02 PROCEDURE — 99213 OFFICE O/P EST LOW 20 MIN: CPT | Performed by: NURSE PRACTITIONER

## 2019-08-02 PROCEDURE — G8420 CALC BMI NORM PARAMETERS: HCPCS | Performed by: NURSE PRACTITIONER

## 2019-08-02 PROCEDURE — G8427 DOCREV CUR MEDS BY ELIG CLIN: HCPCS | Performed by: NURSE PRACTITIONER

## 2019-08-02 RX ORDER — NITROFURANTOIN 25; 75 MG/1; MG/1
100 CAPSULE ORAL 2 TIMES DAILY
Qty: 10 CAPSULE | Refills: 0 | Status: SHIPPED | OUTPATIENT
Start: 2019-08-02 | End: 2019-08-07

## 2019-08-02 ASSESSMENT — ENCOUNTER SYMPTOMS
WHEEZING: 0
VOMITING: 0
CONSTIPATION: 0
SHORTNESS OF BREATH: 0
ABDOMINAL PAIN: 0
SORE THROAT: 0
NAUSEA: 0
COUGH: 0
DIARRHEA: 0

## 2019-08-02 NOTE — PROGRESS NOTES
distress. Eyes: Conjunctivae are normal. Right eye exhibits no discharge. Left eye exhibits no discharge. Cardiovascular: Normal rate and regular rhythm. Pulmonary/Chest: Effort normal. No respiratory distress. Musculoskeletal: Normal range of motion. She exhibits no edema or tenderness. Neurological: She is alert and oriented to person, place, and time. Skin: Skin is warm and dry. Capillary refill takes less than 2 seconds. She is not diaphoretic. No erythema. No pallor. Psychiatric: She has a normal mood and affect. Her behavior is normal. Judgment and thought content normal.   Nursing note and vitals reviewed. Assessment/Plan:      Oliva Lewis was seen today for urinary tract infection. Diagnoses and all orders for this visit:    Dysuria  -     POCT Urinalysis No Micro (Auto)  -     nitrofurantoin, macrocrystal-monohydrate, (MACROBID) 100 MG capsule; Take 1 capsule by mouth 2 times daily for 5 days    Acute cystitis with hematuria  -     nitrofurantoin, macrocrystal-monohydrate, (MACROBID) 100 MG capsule; Take 1 capsule by mouth 2 times daily for 5 days        Return if symptoms worsen or fail to improve. Patient instructions given tomer.         Electronically signed by LEFTY Madrigal CNP on 8/2/2019 at 9:09 AM

## 2019-08-04 LAB
ORGANISM: ABNORMAL
URINE CULTURE, ROUTINE: ABNORMAL

## 2019-10-22 ENCOUNTER — NURSE ONLY (OUTPATIENT)
Dept: FAMILY MEDICINE CLINIC | Age: 69
End: 2019-10-22

## 2019-10-22 LAB — GLUCOSE FASTING: 99 MG/DL (ref 70–108)

## 2019-10-23 ENCOUNTER — OFFICE VISIT (OUTPATIENT)
Dept: FAMILY MEDICINE CLINIC | Age: 69
End: 2019-10-23
Payer: MEDICARE

## 2019-10-23 VITALS
RESPIRATION RATE: 12 BRPM | HEART RATE: 73 BPM | SYSTOLIC BLOOD PRESSURE: 121 MMHG | WEIGHT: 148.2 LBS | TEMPERATURE: 98.7 F | DIASTOLIC BLOOD PRESSURE: 82 MMHG | BODY MASS INDEX: 23.82 KG/M2 | HEIGHT: 66 IN

## 2019-10-23 DIAGNOSIS — E78.00 HYPERCHOLESTEROLEMIA: ICD-10-CM

## 2019-10-23 DIAGNOSIS — I10 ESSENTIAL HYPERTENSION: Primary | ICD-10-CM

## 2019-10-23 DIAGNOSIS — E03.9 ACQUIRED HYPOTHYROIDISM: ICD-10-CM

## 2019-10-23 PROCEDURE — 1090F PRES/ABSN URINE INCON ASSESS: CPT | Performed by: FAMILY MEDICINE

## 2019-10-23 PROCEDURE — 3017F COLORECTAL CA SCREEN DOC REV: CPT | Performed by: FAMILY MEDICINE

## 2019-10-23 PROCEDURE — G8427 DOCREV CUR MEDS BY ELIG CLIN: HCPCS | Performed by: FAMILY MEDICINE

## 2019-10-23 PROCEDURE — G8482 FLU IMMUNIZE ORDER/ADMIN: HCPCS | Performed by: FAMILY MEDICINE

## 2019-10-23 PROCEDURE — 99214 OFFICE O/P EST MOD 30 MIN: CPT | Performed by: FAMILY MEDICINE

## 2019-10-23 PROCEDURE — 1036F TOBACCO NON-USER: CPT | Performed by: FAMILY MEDICINE

## 2019-10-23 PROCEDURE — 4040F PNEUMOC VAC/ADMIN/RCVD: CPT | Performed by: FAMILY MEDICINE

## 2019-10-23 PROCEDURE — G8420 CALC BMI NORM PARAMETERS: HCPCS | Performed by: FAMILY MEDICINE

## 2019-10-23 PROCEDURE — G8400 PT W/DXA NO RESULTS DOC: HCPCS | Performed by: FAMILY MEDICINE

## 2019-10-23 PROCEDURE — 1123F ACP DISCUSS/DSCN MKR DOCD: CPT | Performed by: FAMILY MEDICINE

## 2019-10-23 RX ORDER — CYCLOBENZAPRINE HCL 5 MG
5 TABLET ORAL 3 TIMES DAILY PRN
Qty: 90 TABLET | Refills: 0 | Status: CANCELLED | OUTPATIENT
Start: 2019-10-23

## 2019-10-23 RX ORDER — LEVOTHYROXINE SODIUM 0.05 MG/1
TABLET ORAL
Qty: 90 TABLET | Refills: 1 | Status: SHIPPED | OUTPATIENT
Start: 2019-10-23 | End: 2019-10-31 | Stop reason: SDUPTHER

## 2019-10-23 RX ORDER — ROSUVASTATIN CALCIUM 5 MG/1
5 TABLET, COATED ORAL DAILY
Qty: 30 TABLET | Refills: 5 | Status: SHIPPED | OUTPATIENT
Start: 2019-10-23 | End: 2019-10-30 | Stop reason: SDUPTHER

## 2019-10-23 RX ORDER — LISINOPRIL 10 MG/1
10 TABLET ORAL DAILY
Qty: 90 TABLET | Refills: 1 | Status: SHIPPED | OUTPATIENT
Start: 2019-10-23 | End: 2019-10-31 | Stop reason: SDUPTHER

## 2019-10-31 RX ORDER — ROSUVASTATIN CALCIUM 5 MG/1
5 TABLET, COATED ORAL DAILY
Qty: 90 TABLET | Refills: 1 | Status: SHIPPED | OUTPATIENT
Start: 2019-10-31 | End: 2020-04-06 | Stop reason: SDUPTHER

## 2019-10-31 RX ORDER — LISINOPRIL 10 MG/1
10 TABLET ORAL DAILY
Qty: 90 TABLET | Refills: 1 | Status: SHIPPED | OUTPATIENT
Start: 2019-10-31 | End: 2020-04-29 | Stop reason: SDUPTHER

## 2019-10-31 RX ORDER — LEVOTHYROXINE SODIUM 0.05 MG/1
TABLET ORAL
Qty: 90 TABLET | Refills: 1 | Status: SHIPPED | OUTPATIENT
Start: 2019-10-31 | End: 2020-04-29 | Stop reason: SDUPTHER

## 2019-11-01 ENCOUNTER — APPOINTMENT (OUTPATIENT)
Dept: MRI IMAGING | Age: 69
End: 2019-11-01
Payer: MEDICARE

## 2019-11-01 ENCOUNTER — NURSE TRIAGE (OUTPATIENT)
Dept: OTHER | Facility: CLINIC | Age: 69
End: 2019-11-01

## 2019-11-01 ENCOUNTER — HOSPITAL ENCOUNTER (EMERGENCY)
Age: 69
Discharge: HOME | End: 2019-11-01
Attending: INTERNAL MEDICINE | Admitting: INTERNAL MEDICINE
Payer: MEDICARE

## 2019-11-01 ENCOUNTER — TELEPHONE (OUTPATIENT)
Dept: FAMILY MEDICINE CLINIC | Age: 69
End: 2019-11-01

## 2019-11-01 ENCOUNTER — APPOINTMENT (OUTPATIENT)
Dept: GENERAL RADIOLOGY | Age: 69
End: 2019-11-01
Payer: MEDICARE

## 2019-11-01 ENCOUNTER — HOSPITAL ENCOUNTER (OUTPATIENT)
Dept: INTERVENTIONAL RADIOLOGY/VASCULAR | Age: 69
Discharge: HOME | End: 2019-11-01

## 2019-11-01 VITALS
TEMPERATURE: 98.6 F | HEART RATE: 66 BPM | SYSTOLIC BLOOD PRESSURE: 126 MMHG | OXYGEN SATURATION: 100 % | RESPIRATION RATE: 16 BRPM | DIASTOLIC BLOOD PRESSURE: 86 MMHG

## 2019-11-01 DIAGNOSIS — Z13.6 ENCOUNTER FOR SCREENING FOR VASCULAR DISEASE: ICD-10-CM

## 2019-11-01 DIAGNOSIS — R27.0 ATAXIA: Primary | ICD-10-CM

## 2019-11-01 PROBLEM — R26.9 GAIT DISTURBANCE: Status: ACTIVE | Noted: 2019-11-01

## 2019-11-01 LAB
ALBUMIN SERPL-MCNC: 4.4 G/DL (ref 3.5–5.1)
ALP BLD-CCNC: 133 U/L (ref 38–126)
ALT SERPL-CCNC: 14 U/L (ref 11–66)
ANION GAP SERPL CALCULATED.3IONS-SCNC: 10 MEQ/L (ref 8–16)
AST SERPL-CCNC: 19 U/L (ref 5–40)
BASOPHILS # BLD: 1 %
BASOPHILS ABSOLUTE: 0.1 THOU/MM3 (ref 0–0.1)
BILIRUB SERPL-MCNC: 0.5 MG/DL (ref 0.3–1.2)
BILIRUBIN DIRECT: < 0.2 MG/DL (ref 0–0.3)
BILIRUBIN URINE: NEGATIVE
BLOOD, URINE: NEGATIVE
BUN BLDV-MCNC: 17 MG/DL (ref 7–22)
CALCIUM SERPL-MCNC: 9.7 MG/DL (ref 8.5–10.5)
CHARACTER, URINE: CLEAR
CHLORIDE BLD-SCNC: 101 MEQ/L (ref 98–111)
CO2: 29 MEQ/L (ref 23–33)
COLOR: YELLOW
CREAT SERPL-MCNC: 0.8 MG/DL (ref 0.4–1.2)
EOSINOPHIL # BLD: 9.2 %
EOSINOPHILS ABSOLUTE: 0.8 THOU/MM3 (ref 0–0.4)
ERYTHROCYTE [DISTWIDTH] IN BLOOD BY AUTOMATED COUNT: 13.4 % (ref 11.5–14.5)
ERYTHROCYTE [DISTWIDTH] IN BLOOD BY AUTOMATED COUNT: 45.7 FL (ref 35–45)
GFR SERPL CREATININE-BSD FRML MDRD: 71 ML/MIN/1.73M2
GLUCOSE BLD-MCNC: 104 MG/DL (ref 70–108)
GLUCOSE URINE: NEGATIVE MG/DL
HCT VFR BLD CALC: 42.6 % (ref 37–47)
HEMOGLOBIN: 13.8 GM/DL (ref 12–16)
IMMATURE GRANS (ABS): 0.03 THOU/MM3 (ref 0–0.07)
IMMATURE GRANULOCYTES: 0.3 %
KETONES, URINE: NEGATIVE
LEUKOCYTE ESTERASE, URINE: NEGATIVE
LYMPHOCYTES # BLD: 23.3 %
LYMPHOCYTES ABSOLUTE: 2 THOU/MM3 (ref 1–4.8)
MCH RBC QN AUTO: 30.2 PG (ref 26–33)
MCHC RBC AUTO-ENTMCNC: 32.4 GM/DL (ref 32.2–35.5)
MCV RBC AUTO: 93.2 FL (ref 81–99)
MONOCYTES # BLD: 9.1 %
MONOCYTES ABSOLUTE: 0.8 THOU/MM3 (ref 0.4–1.3)
NITRITE, URINE: NEGATIVE
NUCLEATED RED BLOOD CELLS: 0 /100 WBC
OSMOLALITY CALCULATION: 281.2 MOSMOL/KG (ref 275–300)
PH UA: 7 (ref 5–9)
PLATELET # BLD: 377 THOU/MM3 (ref 130–400)
PMV BLD AUTO: 8.7 FL (ref 9.4–12.4)
POTASSIUM SERPL-SCNC: 3.8 MEQ/L (ref 3.5–5.2)
PROTEIN UA: NEGATIVE
RBC # BLD: 4.57 MILL/MM3 (ref 4.2–5.4)
SEDIMENTATION RATE, ERYTHROCYTE: 16 MM/HR (ref 0–20)
SEG NEUTROPHILS: 57.1 %
SEGMENTED NEUTROPHILS ABSOLUTE COUNT: 5 THOU/MM3 (ref 1.8–7.7)
SODIUM BLD-SCNC: 140 MEQ/L (ref 135–145)
SPECIFIC GRAVITY, URINE: 1.01 (ref 1–1.03)
TOTAL PROTEIN: 8.1 G/DL (ref 6.1–8)
UROBILINOGEN, URINE: 1 EU/DL (ref 0–1)
WBC # BLD: 8.7 THOU/MM3 (ref 4.8–10.8)

## 2019-11-01 PROCEDURE — 80053 COMPREHEN METABOLIC PANEL: CPT

## 2019-11-01 PROCEDURE — 85651 RBC SED RATE NONAUTOMATED: CPT

## 2019-11-01 PROCEDURE — 99223 1ST HOSP IP/OBS HIGH 75: CPT | Performed by: INTERNAL MEDICINE

## 2019-11-01 PROCEDURE — 82248 BILIRUBIN DIRECT: CPT

## 2019-11-01 PROCEDURE — 71045 X-RAY EXAM CHEST 1 VIEW: CPT

## 2019-11-01 PROCEDURE — 9900000021 US VASCULAR SCREENING

## 2019-11-01 PROCEDURE — G0378 HOSPITAL OBSERVATION PER HR: HCPCS

## 2019-11-01 PROCEDURE — 36415 COLL VENOUS BLD VENIPUNCTURE: CPT

## 2019-11-01 PROCEDURE — 70551 MRI BRAIN STEM W/O DYE: CPT

## 2019-11-01 PROCEDURE — 85025 COMPLETE CBC W/AUTO DIFF WBC: CPT

## 2019-11-01 PROCEDURE — 81003 URINALYSIS AUTO W/O SCOPE: CPT

## 2019-11-01 PROCEDURE — 99285 EMERGENCY DEPT VISIT HI MDM: CPT

## 2019-11-01 RX ORDER — SODIUM CHLORIDE 0.9 % (FLUSH) 0.9 %
10 SYRINGE (ML) INJECTION PRN
Status: DISCONTINUED | OUTPATIENT
Start: 2019-11-01 | End: 2019-11-01 | Stop reason: HOSPADM

## 2019-11-01 RX ORDER — SODIUM CHLORIDE 0.9 % (FLUSH) 0.9 %
10 SYRINGE (ML) INJECTION EVERY 12 HOURS SCHEDULED
Status: DISCONTINUED | OUTPATIENT
Start: 2019-11-01 | End: 2019-11-01 | Stop reason: HOSPADM

## 2019-11-01 RX ORDER — ONDANSETRON 2 MG/ML
4 INJECTION INTRAMUSCULAR; INTRAVENOUS EVERY 6 HOURS PRN
Status: DISCONTINUED | OUTPATIENT
Start: 2019-11-01 | End: 2019-11-01 | Stop reason: HOSPADM

## 2019-11-01 ASSESSMENT — ENCOUNTER SYMPTOMS
BACK PAIN: 0
NAUSEA: 0
EYE DISCHARGE: 0
EYE PAIN: 0
RHINORRHEA: 0
WHEEZING: 0
VOMITING: 0
COUGH: 0
ABDOMINAL PAIN: 0
SHORTNESS OF BREATH: 0
DIARRHEA: 0
SORE THROAT: 0

## 2019-11-04 ENCOUNTER — CARE COORDINATION (OUTPATIENT)
Dept: CASE MANAGEMENT | Age: 69
End: 2019-11-04

## 2019-11-05 ENCOUNTER — TELEPHONE (OUTPATIENT)
Dept: FAMILY MEDICINE CLINIC | Age: 69
End: 2019-11-05

## 2019-11-05 ENCOUNTER — INITIAL CONSULT (OUTPATIENT)
Dept: NEUROLOGY | Age: 69
End: 2019-11-05
Payer: MEDICARE

## 2019-11-05 VITALS
HEART RATE: 68 BPM | SYSTOLIC BLOOD PRESSURE: 122 MMHG | BODY MASS INDEX: 26.51 KG/M2 | DIASTOLIC BLOOD PRESSURE: 72 MMHG | HEIGHT: 63 IN | WEIGHT: 149.6 LBS

## 2019-11-05 DIAGNOSIS — R29.2 HYPERREFLEXIA: ICD-10-CM

## 2019-11-05 DIAGNOSIS — R26.0 ATAXIC GAIT: Primary | ICD-10-CM

## 2019-11-05 PROCEDURE — G8400 PT W/DXA NO RESULTS DOC: HCPCS | Performed by: PSYCHIATRY & NEUROLOGY

## 2019-11-05 PROCEDURE — G8417 CALC BMI ABV UP PARAM F/U: HCPCS | Performed by: PSYCHIATRY & NEUROLOGY

## 2019-11-05 PROCEDURE — G8427 DOCREV CUR MEDS BY ELIG CLIN: HCPCS | Performed by: PSYCHIATRY & NEUROLOGY

## 2019-11-05 PROCEDURE — 1090F PRES/ABSN URINE INCON ASSESS: CPT | Performed by: PSYCHIATRY & NEUROLOGY

## 2019-11-05 PROCEDURE — 1123F ACP DISCUSS/DSCN MKR DOCD: CPT | Performed by: PSYCHIATRY & NEUROLOGY

## 2019-11-05 PROCEDURE — 1036F TOBACCO NON-USER: CPT | Performed by: PSYCHIATRY & NEUROLOGY

## 2019-11-05 PROCEDURE — 4040F PNEUMOC VAC/ADMIN/RCVD: CPT | Performed by: PSYCHIATRY & NEUROLOGY

## 2019-11-05 PROCEDURE — 3017F COLORECTAL CA SCREEN DOC REV: CPT | Performed by: PSYCHIATRY & NEUROLOGY

## 2019-11-05 PROCEDURE — G8482 FLU IMMUNIZE ORDER/ADMIN: HCPCS | Performed by: PSYCHIATRY & NEUROLOGY

## 2019-11-05 PROCEDURE — 99204 OFFICE O/P NEW MOD 45 MIN: CPT | Performed by: PSYCHIATRY & NEUROLOGY

## 2019-11-05 RX ORDER — ACETAMINOPHEN 325 MG/1
650 TABLET ORAL EVERY 6 HOURS PRN
COMMUNITY

## 2019-11-07 ENCOUNTER — NURSE ONLY (OUTPATIENT)
Dept: LAB | Age: 69
End: 2019-11-07

## 2019-11-07 DIAGNOSIS — R29.2 HYPERREFLEXIA: ICD-10-CM

## 2019-11-07 DIAGNOSIS — R26.0 ATAXIC GAIT: ICD-10-CM

## 2019-11-07 LAB
RHEUMATOID FACTOR: < 10 IU/ML (ref 0–13)
SEDIMENTATION RATE, ERYTHROCYTE: 15 MM/HR (ref 0–20)

## 2019-11-08 ENCOUNTER — HOSPITAL ENCOUNTER (OUTPATIENT)
Dept: MRI IMAGING | Age: 69
Discharge: HOME | End: 2019-11-08
Payer: MEDICARE

## 2019-11-08 ENCOUNTER — TELEPHONE (OUTPATIENT)
Dept: NEUROLOGY | Age: 69
End: 2019-11-08

## 2019-11-08 DIAGNOSIS — R26.0 ATAXIC GAIT: ICD-10-CM

## 2019-11-08 DIAGNOSIS — R29.2 HYPERREFLEXIA: ICD-10-CM

## 2019-11-08 PROCEDURE — 72141 MRI NECK SPINE W/O DYE: CPT

## 2019-11-10 LAB
ANA SCREEN: NORMAL
ANTI SSA: NORMAL
ANTI SSB: 0 AU/ML (ref 0–40)
DSDNA ANTIBODY: NORMAL

## 2019-11-14 ENCOUNTER — OFFICE VISIT (OUTPATIENT)
Dept: FAMILY MEDICINE CLINIC | Age: 69
End: 2019-11-14
Payer: MEDICARE

## 2019-11-14 VITALS
HEIGHT: 66 IN | DIASTOLIC BLOOD PRESSURE: 76 MMHG | SYSTOLIC BLOOD PRESSURE: 116 MMHG | TEMPERATURE: 97.6 F | BODY MASS INDEX: 24.01 KG/M2 | HEART RATE: 73 BPM | WEIGHT: 149.4 LBS | RESPIRATION RATE: 12 BRPM

## 2019-11-14 DIAGNOSIS — I10 ESSENTIAL HYPERTENSION: ICD-10-CM

## 2019-11-14 DIAGNOSIS — M50.30 DDD (DEGENERATIVE DISC DISEASE), CERVICAL: ICD-10-CM

## 2019-11-14 DIAGNOSIS — E78.00 HYPERCHOLESTEROLEMIA: ICD-10-CM

## 2019-11-14 DIAGNOSIS — R27.0 ATAXIA: Primary | ICD-10-CM

## 2019-11-14 DIAGNOSIS — R77.9 ELEVATED BLOOD PROTEIN: ICD-10-CM

## 2019-11-14 DIAGNOSIS — I67.89 CEREBRAL MICROVASCULAR DISEASE: ICD-10-CM

## 2019-11-14 PROCEDURE — G8400 PT W/DXA NO RESULTS DOC: HCPCS | Performed by: FAMILY MEDICINE

## 2019-11-14 PROCEDURE — G8420 CALC BMI NORM PARAMETERS: HCPCS | Performed by: FAMILY MEDICINE

## 2019-11-14 PROCEDURE — 99214 OFFICE O/P EST MOD 30 MIN: CPT | Performed by: FAMILY MEDICINE

## 2019-11-14 PROCEDURE — G8482 FLU IMMUNIZE ORDER/ADMIN: HCPCS | Performed by: FAMILY MEDICINE

## 2019-11-14 PROCEDURE — 4040F PNEUMOC VAC/ADMIN/RCVD: CPT | Performed by: FAMILY MEDICINE

## 2019-11-14 PROCEDURE — G8427 DOCREV CUR MEDS BY ELIG CLIN: HCPCS | Performed by: FAMILY MEDICINE

## 2019-11-14 PROCEDURE — 1090F PRES/ABSN URINE INCON ASSESS: CPT | Performed by: FAMILY MEDICINE

## 2019-11-14 PROCEDURE — 3017F COLORECTAL CA SCREEN DOC REV: CPT | Performed by: FAMILY MEDICINE

## 2019-11-14 PROCEDURE — 1123F ACP DISCUSS/DSCN MKR DOCD: CPT | Performed by: FAMILY MEDICINE

## 2019-11-14 PROCEDURE — 1036F TOBACCO NON-USER: CPT | Performed by: FAMILY MEDICINE

## 2019-11-17 LAB — PROTEIN ELECTROPHORESIS, SERUM: NORMAL

## 2019-11-26 ENCOUNTER — HOSPITAL ENCOUNTER (OUTPATIENT)
Dept: PHYSICAL THERAPY | Age: 69
Setting detail: THERAPIES SERIES
Discharge: HOME | End: 2019-11-26
Payer: MEDICARE

## 2019-11-26 PROCEDURE — 97161 PT EVAL LOW COMPLEX 20 MIN: CPT

## 2019-11-26 PROCEDURE — 97110 THERAPEUTIC EXERCISES: CPT

## 2019-11-26 ASSESSMENT — PAIN SCALES - GENERAL: PAINLEVEL_OUTOF10: 2

## 2019-11-26 ASSESSMENT — PAIN DESCRIPTION - PAIN TYPE: TYPE: ACUTE PAIN

## 2019-11-26 ASSESSMENT — PAIN DESCRIPTION - LOCATION: LOCATION: NECK;SHOULDER

## 2019-12-03 ENCOUNTER — HOSPITAL ENCOUNTER (OUTPATIENT)
Dept: PHYSICAL THERAPY | Age: 69
Setting detail: THERAPIES SERIES
Discharge: HOME | End: 2019-12-03
Payer: MEDICARE

## 2019-12-03 PROCEDURE — 97110 THERAPEUTIC EXERCISES: CPT

## 2019-12-03 PROCEDURE — 95992 CANALITH REPOSITIONING PROC: CPT

## 2019-12-03 ASSESSMENT — PAIN SCALES - GENERAL: PAINLEVEL_OUTOF10: 2

## 2019-12-03 ASSESSMENT — PAIN DESCRIPTION - PAIN TYPE: TYPE: ACUTE PAIN

## 2019-12-03 ASSESSMENT — PAIN DESCRIPTION - LOCATION: LOCATION: NECK;SHOULDER

## 2019-12-05 ENCOUNTER — HOSPITAL ENCOUNTER (OUTPATIENT)
Dept: PHYSICAL THERAPY | Age: 69
Setting detail: THERAPIES SERIES
Discharge: HOME | End: 2019-12-05
Payer: MEDICARE

## 2019-12-05 PROCEDURE — 97110 THERAPEUTIC EXERCISES: CPT

## 2019-12-05 PROCEDURE — 97140 MANUAL THERAPY 1/> REGIONS: CPT

## 2019-12-10 ENCOUNTER — APPOINTMENT (OUTPATIENT)
Dept: PHYSICAL THERAPY | Age: 69
End: 2019-12-10
Payer: MEDICARE

## 2019-12-11 ENCOUNTER — OFFICE VISIT (OUTPATIENT)
Dept: NEUROLOGY | Age: 69
End: 2019-12-11
Payer: MEDICARE

## 2019-12-11 VITALS
BODY MASS INDEX: 23.95 KG/M2 | HEIGHT: 66 IN | SYSTOLIC BLOOD PRESSURE: 118 MMHG | DIASTOLIC BLOOD PRESSURE: 62 MMHG | HEART RATE: 58 BPM | WEIGHT: 149 LBS

## 2019-12-11 DIAGNOSIS — R26.0 ATAXIC GAIT: Primary | ICD-10-CM

## 2019-12-11 DIAGNOSIS — R29.2 HYPERREFLEXIA: ICD-10-CM

## 2019-12-11 PROCEDURE — G8482 FLU IMMUNIZE ORDER/ADMIN: HCPCS | Performed by: NURSE PRACTITIONER

## 2019-12-11 PROCEDURE — G8427 DOCREV CUR MEDS BY ELIG CLIN: HCPCS | Performed by: NURSE PRACTITIONER

## 2019-12-11 PROCEDURE — 1123F ACP DISCUSS/DSCN MKR DOCD: CPT | Performed by: NURSE PRACTITIONER

## 2019-12-11 PROCEDURE — 1090F PRES/ABSN URINE INCON ASSESS: CPT | Performed by: NURSE PRACTITIONER

## 2019-12-11 PROCEDURE — G8420 CALC BMI NORM PARAMETERS: HCPCS | Performed by: NURSE PRACTITIONER

## 2019-12-11 PROCEDURE — 4040F PNEUMOC VAC/ADMIN/RCVD: CPT | Performed by: NURSE PRACTITIONER

## 2019-12-11 PROCEDURE — 3017F COLORECTAL CA SCREEN DOC REV: CPT | Performed by: NURSE PRACTITIONER

## 2019-12-11 PROCEDURE — 1036F TOBACCO NON-USER: CPT | Performed by: NURSE PRACTITIONER

## 2019-12-11 PROCEDURE — G8400 PT W/DXA NO RESULTS DOC: HCPCS | Performed by: NURSE PRACTITIONER

## 2019-12-11 PROCEDURE — 99213 OFFICE O/P EST LOW 20 MIN: CPT | Performed by: NURSE PRACTITIONER

## 2019-12-12 ENCOUNTER — HOSPITAL ENCOUNTER (OUTPATIENT)
Dept: PHYSICAL THERAPY | Age: 69
Setting detail: THERAPIES SERIES
Discharge: HOME | End: 2019-12-12
Payer: MEDICARE

## 2019-12-12 ENCOUNTER — NURSE ONLY (OUTPATIENT)
Dept: LAB | Age: 69
End: 2019-12-12

## 2019-12-12 DIAGNOSIS — I10 ESSENTIAL HYPERTENSION: ICD-10-CM

## 2019-12-12 DIAGNOSIS — E03.9 ACQUIRED HYPOTHYROIDISM: ICD-10-CM

## 2019-12-12 LAB
BACTERIA: ABNORMAL
BILIRUBIN URINE: NEGATIVE
BLOOD, URINE: NEGATIVE
CASTS: ABNORMAL /LPF
CASTS: ABNORMAL /LPF
CHARACTER, URINE: CLEAR
CHOLESTEROL, TOTAL: 141 MG/DL (ref 100–199)
COLOR: YELLOW
CRYSTALS: ABNORMAL
EPITHELIAL CELLS, UA: ABNORMAL /HPF
GLUCOSE, URINE: NEGATIVE MG/DL
HDLC SERPL-MCNC: 67 MG/DL
KETONES, URINE: NEGATIVE
LDL CHOLESTEROL CALCULATED: 57 MG/DL
LEUKOCYTE ESTERASE, URINE: ABNORMAL
MISCELLANEOUS LAB TEST RESULT: ABNORMAL
NITRITE, URINE: NEGATIVE
PH UA: 7.5 (ref 5–9)
PROTEIN UA: NEGATIVE MG/DL
RBC URINE: ABNORMAL /HPF
RENAL EPITHELIAL, UA: ABNORMAL
SPECIFIC GRAVITY UA: 1.01 (ref 1–1.03)
T4 FREE: 1.47 NG/DL (ref 0.93–1.76)
TRIGL SERPL-MCNC: 83 MG/DL (ref 0–199)
TSH SERPL DL<=0.05 MIU/L-ACNC: 1.26 UIU/ML (ref 0.4–4.2)
UROBILINOGEN, URINE: 0.2 EU/DL (ref 0–1)
WBC UA: ABNORMAL /HPF
YEAST: ABNORMAL

## 2019-12-12 PROCEDURE — 97140 MANUAL THERAPY 1/> REGIONS: CPT

## 2019-12-12 PROCEDURE — 97110 THERAPEUTIC EXERCISES: CPT

## 2019-12-12 ASSESSMENT — PAIN SCALES - GENERAL: PAINLEVEL_OUTOF10: 2

## 2019-12-12 ASSESSMENT — PAIN DESCRIPTION - PAIN TYPE: TYPE: ACUTE PAIN

## 2019-12-12 ASSESSMENT — PAIN DESCRIPTION - LOCATION: LOCATION: NECK;SHOULDER

## 2019-12-17 ENCOUNTER — HOSPITAL ENCOUNTER (OUTPATIENT)
Dept: PHYSICAL THERAPY | Age: 69
Setting detail: THERAPIES SERIES
Discharge: HOME | End: 2019-12-17
Payer: MEDICARE

## 2019-12-17 PROCEDURE — 97110 THERAPEUTIC EXERCISES: CPT

## 2019-12-17 PROCEDURE — 97140 MANUAL THERAPY 1/> REGIONS: CPT

## 2019-12-19 ENCOUNTER — HOSPITAL ENCOUNTER (OUTPATIENT)
Dept: PHYSICAL THERAPY | Age: 69
Setting detail: THERAPIES SERIES
Discharge: HOME | End: 2019-12-19
Payer: MEDICARE

## 2019-12-19 PROCEDURE — 97140 MANUAL THERAPY 1/> REGIONS: CPT

## 2019-12-19 PROCEDURE — 97110 THERAPEUTIC EXERCISES: CPT

## 2019-12-31 ENCOUNTER — HOSPITAL ENCOUNTER (OUTPATIENT)
Dept: PHYSICAL THERAPY | Age: 69
Setting detail: THERAPIES SERIES
Discharge: HOME | End: 2019-12-31
Payer: MEDICARE

## 2019-12-31 PROCEDURE — 97110 THERAPEUTIC EXERCISES: CPT

## 2019-12-31 NOTE — PROGRESS NOTES
[address] [facility]     Time In: 4494  Time Out: 1003  Minutes: 30  Timed Code Treatment Minutes: 30 Minutes                Date: 2019  Patient Name: Marcia Rivera,  Gender:  female        CSN: [id number]   : 1950  (81 y.o.)  Referral Date : 19    Referring Practitioner: Kristi Duff MD       Diagnosis: M50.30 (ICD-10-CM) - DDD (degenerative disc disease), cervical  Treatment Diagnosis: Cervicalgia, headache, muscular weakness, abnormal posture   Additional Pertinent Hx: Carotid ultrasound negative bilaterally                    General:  PT Visit Information  Onset Date: 19  PT Insurance Information: Medicare - secondary [employer] 19 - unlimited visits, aquatic and modalities covered, no ionto, no heat/cold  Total # of Visits to Date: 7  Plan of Care/Certification Expiration Date: 20  Progress Note Counter: 7/10 for PN               Subjective:  Comments: Neuro follow up 19. Subjective: Patient returning to therapy after being off for a couple weeks for [employer]. She traveled for the holiday and the driving was increasing her neck pain, today denies pain.       Pain:  Patient Currently in Pain: No         Objective    Exercises  Exercise 1: Manual therapy supine x5 mins- gentle cervical traction 3x 30 sec, suboccipital release 3x 10 sec, (Not today - passive upper trap stretch holding scapular depression)   Exercise 5: Standing scapular theraband orange x20 - rows, extension, horizontal abd   Exercise 6: Hydrochest Level 3 no pillow with cerv retraction x20 reps, and single UE x10 reps   Exercise 7: hydrostick forward SMALL range x10, monitor LBP - held other directions today  Exercise 8: Standing cerv retraction with ball behind head, shoulder flex, abd, shrug, retraction x10 - next time add hand weight   Exercise 9: UBE warm up Seat 4, 2 mins forward and 2 mins retro 120 RPM          Activity impairment to less than 10% in order to perform vacuuming and household tasks. 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 Long term goal 3: Patient will test negative for BPPV in order to rule out vestibular contributions to dizziness.     Rickie Dorman, PT

## 2020-01-02 ENCOUNTER — HOSPITAL ENCOUNTER (OUTPATIENT)
Dept: PHYSICAL THERAPY | Age: 70
Setting detail: THERAPIES SERIES
Discharge: HOME | End: 2020-01-02
Payer: MEDICARE

## 2020-01-02 PROCEDURE — 97110 THERAPEUTIC EXERCISES: CPT

## 2020-01-02 NOTE — PROGRESS NOTES
Decreased high-level IADLs, Increased pain, Vestibular Impairment, Decreased balance  Assessment: Progressed strengthening on Nautilus equipment with no increased pain, patient doing well, plan discharge after remaining visits. Prognosis: Excellent       Patient Education:  Patient Education: Continue HEP                      Plan:  Times per week: 2  Plan weeks: 12  Specific instructions for Next Treatment: Plan to screen for BPPV in order to rule out vestibular contributions to dizziness. May benefit from Dry Needling suboccipitals for headache/dizziness. Monitor trigeminal Left sided pain to jaw. Gentle cervical traction, suboccipital release, upper trap stretching and postural scapular strengthening  Current Treatment Recommendations: Strengthening, ROM, Balance Training, Manual Therapy - Soft Tissue Mobilization, Manual Therapy - Joint Manipulation, Home Exercise Program, Modalities, Vestibular Rehab, Aquatics, Integrated Dry Needling    Goals:       Short term goals  Time Frame for Short term goals: 6 weeks  Short term goal 1: Patient will report decreased neck pain and headache pain to 1/10 less than 25% of the time in order to sleep more comfortably. Short term goal 2: Patient will increase B cervical spine AROM to 0-30 deg sidebend and 0-70 deg rotation in order to decrease strain on neck with driving. Short term goal 3: Patient will demonstrate good cervical and scapular retraction posture during therapy exercises without cues from therapist in order to stabilize cervical areas with pushing/pulling. Short term goal 4: Patient will be IND with HEP in order to meet long term goals. Long term goals  Time Frame for Long term goals : 12 weeks  Long term goal 1: Patient will improve score of Neck Disability Inventory from baseline 30% impairment to less than 10% in order to perform vacuuming and household tasks.    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 Long term goal 3: Patient will test negative for BPPV in order to rule out vestibular contributions to dizziness.     Torsten Cleveland, PT

## 2020-01-07 ENCOUNTER — APPOINTMENT (OUTPATIENT)
Dept: PHYSICAL THERAPY | Age: 70
End: 2020-01-07
Payer: MEDICARE

## 2020-01-09 ENCOUNTER — HOSPITAL ENCOUNTER (OUTPATIENT)
Dept: PHYSICAL THERAPY | Age: 70
Setting detail: THERAPIES SERIES
Discharge: HOME | End: 2020-01-09
Payer: MEDICARE

## 2020-01-09 PROCEDURE — 97110 THERAPEUTIC EXERCISES: CPT

## 2020-01-09 NOTE — DISCHARGE SUMMARY
lifting heavy weights but I can manage light to medium weights if they are conveniently placed   Reading 1: I can read as much as I want to with slight pain in my neck   Headaches 1: I have slight headaches, which come infrequently   Concentration 0: I can concentrate fully when I want to with no difficulty   Work 1: I can only do my usual work, but no more   Driving 2: I can drive my car as long as I want with moderate pain in my neck   Sleeping 1: My sleep is slightly disturbed (less than 1 hr sleepless)   Recreation 1: I am able to engage in all my recreation activities, with some pain in my neck    Total score/Total possible X 100 =   11/50 = 22% Disability           20-39% Impaired - CJ           Activity Tolerance:  Activity Tolerance: Patient Tolerated treatment well    Assessment: Body structures, Functions, Activity limitations: Decreased ROM, Decreased strength, Decreased sensation, Decreased high-level IADLs, Increased pain, Vestibular Impairment, Decreased balance  Assessment: Patient has made excellent progress toward therapy goals. She initially presented with severe headache, neck pain and dizziness. Good response to R ear cannalith repositioning for vertigo, and able to resolve headache complaints with cervical retraction program. She has improved her cervical stabilization strength and shoulder strength, compliant with HEP daily. She is agreeable to discharge from PT. Prognosis: Excellent       Patient Education:  Patient Education: Reviewed HEP, Independent without cues from therapist                      Plan:  Plan Comment: Discharge from PT    Goals:       Short term goals  Time Frame for Short term goals: 6 weeks  Short term goal 1: Patient will report decreased neck pain and headache pain to 1/10 less than 25% of the time in order to sleep more comfortably. - Goal Met 1/9/2020 - neck pain 1/10. Reports headaches 10% of the time, much improved with cervical retraction stretching.    Short term goal 2: Patient will increase B cervical spine AROM to 0-30 deg sidebend and 0-70 deg rotation in order to decrease strain on neck with driving. - Goal Met 3/4/2243 - Rotation 0-75 deg bilateral, Right sidebend 0-35 deg, left sidebend 0-40 deg  Short term goal 3: Patient will demonstrate good cervical and scapular retraction posture during therapy exercises without cues from therapist in order to stabilize cervical areas with pushing/pulling.  - Goal Met 1/9/2020 - good cervical retraction posture with exercises, good scapular retraction technique. Short term goal 4: Patient will be IND with HEP in order to meet long term goals. - Goal Met 1/9/2020 - using HEP handouts for postural exercises, stretching, and scapular strengthening. Long term goals  Time Frame for Long term goals : 12 weeks   Long term goal 1: Patient will improve score of Neck Disability Inventory from baseline 30% impairment to less than 10% in order to perform vacuuming and household tasks. - Not Met 1/9/2020 - progress made, score 22% impairment. Still avoiding heavy lifting and vacuuming tasks. 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 - Goal Met 1/9/2020 - 5/5 shoulder flexion, abduction with good posture, cervical retraction. Long term goal 3: Patient will test negative for BPPV in order to rule out vestibular contributions to dizziness. - Goal Met 1/9/2020 - Negative bilaterally Hallpike [place] test, denies vertigo symptoms.      Jorge A Hernandez, PT

## 2020-01-22 ENCOUNTER — OFFICE VISIT (OUTPATIENT)
Dept: FAMILY MEDICINE CLINIC | Age: 70
End: 2020-01-22
Payer: MEDICARE

## 2020-01-22 VITALS
HEIGHT: 66 IN | BODY MASS INDEX: 23.11 KG/M2 | RESPIRATION RATE: 14 BRPM | WEIGHT: 143.8 LBS | SYSTOLIC BLOOD PRESSURE: 130 MMHG | HEART RATE: 62 BPM | TEMPERATURE: 97.6 F | DIASTOLIC BLOOD PRESSURE: 76 MMHG

## 2020-01-22 PROCEDURE — 4040F PNEUMOC VAC/ADMIN/RCVD: CPT | Performed by: FAMILY MEDICINE

## 2020-01-22 PROCEDURE — G8926 SPIRO NO PERF OR DOC: HCPCS | Performed by: FAMILY MEDICINE

## 2020-01-22 PROCEDURE — 1090F PRES/ABSN URINE INCON ASSESS: CPT | Performed by: FAMILY MEDICINE

## 2020-01-22 PROCEDURE — 1123F ACP DISCUSS/DSCN MKR DOCD: CPT | Performed by: FAMILY MEDICINE

## 2020-01-22 PROCEDURE — G8427 DOCREV CUR MEDS BY ELIG CLIN: HCPCS | Performed by: FAMILY MEDICINE

## 2020-01-22 PROCEDURE — G8420 CALC BMI NORM PARAMETERS: HCPCS | Performed by: FAMILY MEDICINE

## 2020-01-22 PROCEDURE — 99214 OFFICE O/P EST MOD 30 MIN: CPT | Performed by: FAMILY MEDICINE

## 2020-01-22 PROCEDURE — G8400 PT W/DXA NO RESULTS DOC: HCPCS | Performed by: FAMILY MEDICINE

## 2020-01-22 PROCEDURE — 3023F SPIROM DOC REV: CPT | Performed by: FAMILY MEDICINE

## 2020-01-22 PROCEDURE — 1036F TOBACCO NON-USER: CPT | Performed by: FAMILY MEDICINE

## 2020-01-22 PROCEDURE — G8482 FLU IMMUNIZE ORDER/ADMIN: HCPCS | Performed by: FAMILY MEDICINE

## 2020-01-22 PROCEDURE — 3017F COLORECTAL CA SCREEN DOC REV: CPT | Performed by: FAMILY MEDICINE

## 2020-01-22 RX ORDER — ALBUTEROL SULFATE 90 UG/1
2 AEROSOL, METERED RESPIRATORY (INHALATION) EVERY 6 HOURS PRN
Qty: 1 INHALER | Refills: 3 | Status: SHIPPED | OUTPATIENT
Start: 2020-01-22

## 2020-01-22 ASSESSMENT — PATIENT HEALTH QUESTIONNAIRE - PHQ9
SUM OF ALL RESPONSES TO PHQ QUESTIONS 1-9: 0
SUM OF ALL RESPONSES TO PHQ QUESTIONS 1-9: 0
2. FEELING DOWN, DEPRESSED OR HOPELESS: 0
1. LITTLE INTEREST OR PLEASURE IN DOING THINGS: 0
SUM OF ALL RESPONSES TO PHQ9 QUESTIONS 1 & 2: 0

## 2020-01-22 NOTE — PROGRESS NOTES
her.    Significant Past Medical History:    Past Medical History:   Diagnosis Date    Allergic rhinitis     Arthritis     left thumb    COPD (chronic obstructive pulmonary disease) ([facility]     GERD (gastroesophageal reflux disease)     HTN (hypertension) 2013    Hyperlipemia     Hypothyroid     Levothyroxine    IBS (irritable bowel syndrome)     Knee cap dislocation     broke right knee    Multilevel degenerative disc disease 2009    Takes Myra Daniele off and on as needed    Osteoarthritis 1982    Osteopenia 2008    PUD (peptic ulcer disease)     Recurrent low back pain     disc disease           Allergies:  is allergic to amoxicillin; darvon [propoxyphene hcl]; seasonal; and sudafed [pseudoephedrine hcl].     Medications:   Current Outpatient Medications   Medication Sig Dispense Refill    Omega-3 Fatty Acids (FISH OIL PO) Take by mouth      albuterol sulfate HFA (PROVENTIL HFA) 108 (90 Base) MCG/ACT inhaler Inhale 2 puffs into the lungs every 6 hours as needed for Wheezing 1 Inhaler 3    acetaminophen (TYLENOL) 325 MG tablet Take 650 mg by mouth every 6 hours as needed for Pain      rosuvastatin (CRESTOR) 5 MG tablet Take 1 tablet by mouth daily 90 tablet 1    lisinopril (PRINIVIL;ZESTRIL) 10 MG tablet Take 1 tablet by mouth daily 90 tablet 1    levothyroxine (SYNTHROID) 50 MCG tablet TAKE 1 TABLET DAILY 90 tablet 1    Coenzyme Q10 (COQ-10 PO) Take 1 tablet by mouth daily       cyclobenzaprine (FLEXERIL) 5 MG tablet Take 1 tablet by mouth 3 times daily as needed (back pain) Rx  09 90 tablet 0    Ciclopirox (PENLAC EX) Apply topically      aspirin 81 MG tablet Take 81 mg by mouth daily      calcium carbonate (OSCAL) 500 MG TABS tablet Take 500 mg by mouth daily      Cholecalciferol (D3 VITAMIN PO) Take 1,000 Units by mouth daily       vitamin C (ASCORBIC ACID) 500 MG tablet Take 500 mg by mouth daily      Krill Oil 500 MG CAPS Take 1 tablet by mouth daily

## 2020-02-13 ENCOUNTER — OFFICE VISIT (OUTPATIENT)
Dept: PULMONOLOGY | Age: 70
End: 2020-02-13
Payer: MEDICARE

## 2020-02-13 VITALS
BODY MASS INDEX: 23.14 KG/M2 | WEIGHT: 144 LBS | DIASTOLIC BLOOD PRESSURE: 82 MMHG | HEIGHT: 66 IN | SYSTOLIC BLOOD PRESSURE: 122 MMHG | HEART RATE: 69 BPM | TEMPERATURE: 99.2 F | OXYGEN SATURATION: 97 %

## 2020-02-13 PROCEDURE — 1123F ACP DISCUSS/DSCN MKR DOCD: CPT | Performed by: INTERNAL MEDICINE

## 2020-02-13 PROCEDURE — 1036F TOBACCO NON-USER: CPT | Performed by: INTERNAL MEDICINE

## 2020-02-13 PROCEDURE — G8482 FLU IMMUNIZE ORDER/ADMIN: HCPCS | Performed by: INTERNAL MEDICINE

## 2020-02-13 PROCEDURE — 3017F COLORECTAL CA SCREEN DOC REV: CPT | Performed by: INTERNAL MEDICINE

## 2020-02-13 PROCEDURE — 1090F PRES/ABSN URINE INCON ASSESS: CPT | Performed by: INTERNAL MEDICINE

## 2020-02-13 PROCEDURE — G8420 CALC BMI NORM PARAMETERS: HCPCS | Performed by: INTERNAL MEDICINE

## 2020-02-13 PROCEDURE — G8427 DOCREV CUR MEDS BY ELIG CLIN: HCPCS | Performed by: INTERNAL MEDICINE

## 2020-02-13 PROCEDURE — 99204 OFFICE O/P NEW MOD 45 MIN: CPT | Performed by: INTERNAL MEDICINE

## 2020-02-13 PROCEDURE — 4040F PNEUMOC VAC/ADMIN/RCVD: CPT | Performed by: INTERNAL MEDICINE

## 2020-02-13 PROCEDURE — G8400 PT W/DXA NO RESULTS DOC: HCPCS | Performed by: INTERNAL MEDICINE

## 2020-02-13 RX ORDER — FLUTICASONE PROPIONATE 50 MCG
2 SPRAY, SUSPENSION (ML) NASAL DAILY
Qty: 1 BOTTLE | Refills: 5 | Status: SHIPPED | OUTPATIENT
Start: 2020-02-13 | End: 2022-04-21

## 2020-02-13 ASSESSMENT — ENCOUNTER SYMPTOMS
NAUSEA: 0
RHINORRHEA: 1
CHEST TIGHTNESS: 1
VOICE CHANGE: 0
CONSTIPATION: 0
COUGH: 1
BACK PAIN: 0
PHOTOPHOBIA: 0
SHORTNESS OF BREATH: 1
CHOKING: 1
APNEA: 0
BLOOD IN STOOL: 0
FACIAL SWELLING: 0
ABDOMINAL DISTENTION: 0
ABDOMINAL PAIN: 0
SINUS PRESSURE: 1
VOMITING: 0

## 2020-02-13 NOTE — PROGRESS NOTES
Subjective:      Patient ID: Brendan Coronel is a 78 y.o. female. CC: SOB    HPI   Oksana Peacock is referred by Dr Temo Dior for pulmonary evaluation for chronic cough. Oksana Peacock has known h/o seasonal allergies, multiple environmental allergies diagnosed when she worked for [employer] she was on allergy shots for a while but then she changed insurances and was not longer convenient. Symptoms include chronic throat clearing, episodes of \"choking\" when she is in the treadmill, HERNANDEZ, chest pain, nasal congestion, post-nasal drip, sneezing, nasal obstruction. Problems worse during Spring. Has tried Claritin/Zyrtec without benefit, Has Fluticasone which she does not use, started using again per Dr Parks Read request.  Has GERD has been on PPI in the past however no longer using it but she describes current problems with heartburn  Has AKOSUA diagnosed in [facility] which she elected not to treat. CTA chest (2016) was complete normal no parenchymal lesions, however CXR (2019) read as \"mild fibrotic stranding R apical region\"  Has a diagnosis of COPD in epic however PFTs (2015) only reveal a reduction of DLCO, no airway obstruction. Reports allergies to grass, dust, mold---has 6 cats at home and feeds wild birds. 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 Second hand smoke from first  15 years (dead of lung cancer)  C/O Heatburn, francois h/o GERD  Lisinopril---\"it helps me with the cough\"  CTD work up negative \"has been positive in the past\"    Review of Systems   Constitutional: Negative for appetite change, chills, diaphoresis, fatigue, fever and unexpected weight change. HENT: Positive for congestion, postnasal drip, rhinorrhea, sinus pressure and sneezing. Negative for facial swelling, nosebleeds and voice change. Eyes: Negative for photophobia and visual disturbance. Respiratory: Positive for cough, choking, chest tightness and shortness of breath. LAPAROSCOPY  1983    abd infection    SKIN CANCER EXCISION  13    Dr Joelle Harris       Social History     Tobacco Use    Smoking status: Former Smoker     Packs/day: 0.50     Years: 0.50     Pack years: 0.25     Last attempt to quit: 1973     Years since quittin.5    Smokeless tobacco: Never Used   Substance Use Topics    Alcohol use: Yes     Alcohol/week: 0.0 standard drinks    Drug use: No      Allergies   Allergen Reactions    Amoxicillin Diarrhea    Darvon [Propoxyphene Hcl] Other (See Comments)     Feeling of extremity numbness.  Seasonal       cat, grass, dust, mold    Sudafed [Pseudoephedrine Hcl] Other (See Comments)     Restless feeling.       Family History   Problem Relation Age of Onset    High Blood Pressure Father     Other Father 76        abd anyuerism    Heart Disease Father         abdominal aortic aneurysm    Thyroid Disease Mother 43    Other Mother 21        psoriasis    Heart Failure Mother 76    Heart Disease Mother 79        A fib     Heart Disease Brother 6        rheumatic fever as child     Heart Disease Maternal Uncle         unknown type of disease     Cancer Paternal Aunt         sarcoma hip     Cancer Maternal Grandmother     Heart Disease Sister 64        CAD, s/p triple bypass    Diabetes Paternal Grandmother      Current Outpatient Medications   Medication Sig Dispense Refill    Omega-3 Fatty Acids (FISH OIL PO) Take by mouth      albuterol sulfate HFA (PROVENTIL HFA) 108 (90 Base) MCG/ACT inhaler Inhale 2 puffs into the lungs every 6 hours as needed for Wheezing 1 Inhaler 3    acetaminophen (TYLENOL) 325 MG tablet Take 650 mg by mouth every 6 hours as needed for Pain      rosuvastatin (CRESTOR) 5 MG tablet Take 1 tablet by mouth daily 90 tablet 1    lisinopril (PRINIVIL;ZESTRIL) 10 MG tablet Take 1 tablet by mouth daily 90 tablet 1    levothyroxine (SYNTHROID) 50 MCG tablet TAKE 1 TABLET DAILY 90 tablet 1    Coenzyme Q10 (COQ-10 PO) Take 1 tablet by mouth daily       cyclobenzaprine (FLEXERIL) 5 MG tablet Take 1 tablet by mouth 3 times daily as needed (back pain) Rx  09 90 tablet 0    Ciclopirox (PENLAC EX) Apply topically      aspirin 81 MG tablet Take 81 mg by mouth daily      calcium carbonate (OSCAL) 500 MG TABS tablet Take 500 mg by mouth daily      Cholecalciferol (D3 VITAMIN PO) Take 1,000 Units by mouth daily       vitamin C (ASCORBIC ACID) 500 MG tablet Take 500 mg by mouth daily      Krill Oil 500 MG CAPS Take 1 tablet by mouth daily      Loperamide HCl (IMODIUM PO) Take  by mouth daily as needed.  Blood Pressure KIT by Does not apply route. 1 kit 0     No current facility-administered medications for this visit. LABS - none    There were no vitals taken for this visit. Wt Readings from Last 3 Encounters:   20 143 lb 12.8 oz (65.2 kg)   19 149 lb (67.6 kg)   19 149 lb 6.4 oz (67.8 kg)     Neck Circumference - 12.5 in; Mallampati Score - 1    Objective:   Physical Exam  Constitutional:       General: She is not in acute distress. Appearance: She is well-developed. She is not diaphoretic. HENT:      Head: Normocephalic and atraumatic. Mouth/Throat:      Pharynx: No oropharyngeal exudate. Eyes:      General: No scleral icterus. Right eye: No discharge. Left eye: No discharge. Pupils: Pupils are equal, round, and reactive to light. Neck:      Musculoskeletal: Normal range of motion and neck supple. Vascular: No JVD. Trachea: No tracheal deviation. Cardiovascular:      Rate and Rhythm: Normal rate and regular rhythm. Heart sounds: No murmur. No friction rub. No gallop. Pulmonary:      Effort: Pulmonary effort is normal. No respiratory distress. Breath sounds: Normal breath sounds. No stridor. No wheezing or rales. Chest:      Chest wall: No tenderness. Abdominal:      General: Abdomen is flat.    Musculoskeletal: Normal range of motion. General: No tenderness. Lymphadenopathy:      Cervical: No cervical adenopathy. Skin:     General: Skin is warm. Coloration: Skin is not pale. Findings: No erythema or rash. Neurological:      Mental Status: She is alert and oriented to person, place, and time. Cranial Nerves: No cranial nerve deficit. Psychiatric:         Behavior: Behavior normal.         Thought Content: Thought content normal.         Judgment: Judgment normal.       PFTs:            FeNO: 11 (NL)      ECHO: 2014    SUMMARY:     Left ventricle:  Size was normal.  Systolic function was normal. Ejection fraction was estimated in the range of 55 % to 65 %. There were no regional wall motion abnormalities. Doppler parameters were consistent with abnormal left ventricular relaxation (grade 1 diastolic dysfunction).    Left atrium:  The atrium was mildly dilated.     Mitral valve: There was mild regurgitation.     Tricuspid valve: There was mild to moderate regurgitation.     Pulmonary arteries:  Systolic pressure was within the normal range. Estimated peak pressure was 35 mmHg.     Prepared and signed by  Sasha Noguera MD  Signed 23-Dec-2014 23:19:07     CXR:  2019  Impression   1. Normal heart size. No acute infiltrates or effusions are seen. 2. Minimal fibrotic stranding right apical region.               **This report has been created using voice recognition software.  It may contain minor errors which are inherent in voice recognition technology. **       Final report electronically signed by Dr. Gladys Granger on 11/1/2019 3:34 PM     CTA chest:  2014  FINDINGS: Mediastinum and upper abdomen are unremarkable.  There is dependent atelectasis without consolidation.       Pulmonary arteries are well opacified.           Impression   IMPRESSION: No acute findings           Final report electronically signed by Dr. Kulwinder Toscano on 12/26/2014 11:22 PM       Assessment:      .   Diagnosis Orders   1. SOB (shortness of breath)  Rast Panel    6 Minute Walk Test    Alpha-1-Antitrypsin    Nitric oxide    Full PFT Study With Bronchodilator    Bronchial Challenge   2. Other seasonal allergic rhinitis   Rast Panel    Nitric oxide   3.  Chronic throat clearing             Plan:      Orders Placed This Encounter   Procedures    Rast Panel     Standing Status:   Future     Standing Expiration Date:   8/13/2021    Alpha-1-Antitrypsin     Standing Status:   Future     Standing Expiration Date:   2/13/2021    Nitric oxide     Standing Status:   Future     Standing Expiration Date:   2/13/2021    6 Minute Walk Test     Standing Status:   Future     Standing Expiration Date:   8/13/2021    Full PFT Study With Bronchodilator     Standing Status:   Future     Standing Expiration Date:   2/13/2021    Bronchial Challenge      RTC 4 weeks

## 2020-02-24 ENCOUNTER — TELEPHONE (OUTPATIENT)
Dept: PULMONOLOGY | Age: 70
End: 2020-02-24

## 2020-02-24 NOTE — TELEPHONE ENCOUNTER
----- Message from Ayanna Paez MD sent at 2/21/2020  4:31 PM EST -----  Results are normal, please notify patient.

## 2020-02-28 ENCOUNTER — HOSPITAL ENCOUNTER (OUTPATIENT)
Dept: PULMONOLOGY | Age: 70
Discharge: HOME | End: 2020-02-28
Payer: MEDICARE

## 2020-02-28 ENCOUNTER — NURSE ONLY (OUTPATIENT)
Dept: LAB | Age: 70
End: 2020-02-28

## 2020-02-28 PROCEDURE — 94010 BREATHING CAPACITY TEST: CPT

## 2020-02-28 PROCEDURE — 94070 EVALUATION OF WHEEZING: CPT

## 2020-02-28 PROCEDURE — 2580000003 HC RX 258

## 2020-02-28 PROCEDURE — 94729 DIFFUSING CAPACITY: CPT

## 2020-02-28 PROCEDURE — 94726 PLETHYSMOGRAPHY LUNG VOLUMES: CPT

## 2020-03-03 LAB
ALLERGEN BERMUDA GRASS IGE: < 0.1 KU/L
ALLERGEN BIRCH IGE: < 0.1 KU/L
ALLERGEN BOX ELDER: < 0.1 KU/L
ALLERGEN CAT DANDER IGE: < 0.1 KU/L
ALLERGEN COMMON SHORT RAGWEED IGE: < 0.1 KU/L
ALLERGEN COTTONWOOD: < 0.1 KU/L
ALLERGEN D. FARINAE (DUST MITE): < 0.1 KU/L
ALLERGEN DOG DANDER IGE: < 0.1 KU/L
ALLERGEN ELM IGE: < 0.1 KU/L
ALLERGEN FUNGI/MOLD A. ALTERNATA IGE: < 0.1 KU/L
ALLERGEN FUNGI/MOLD A. FUMIGATUS IGE: < 0.1 KU/L
ALLERGEN FUNGI/MOLD M.RACEMOSUS IGE: < 0.1 KU/L
ALLERGEN FUNGI/MOLD P. NOTATUM IGE: < 0.1 KU/L
ALLERGEN GERMAN COCKROACH IGE: < 0.1 KU/L
ALLERGEN INTERPRETATION/SCORE: NORMAL
ALLERGEN MITE DUST PTERONYSSINUS IGE: < 0.1 KU/L
ALLERGEN MOUNTAIN CEDAR: < 0.1 KU/L
ALLERGEN MOUSE EPITHELIA IGE: < 0.1 KU/L
ALLERGEN PECAN TREE IGE: < 0.1 KU/L
ALLERGEN RUSSIAN THISTLE IGE: < 0.1 KU/L
ALLERGEN SHEEP SORREL (W18) IGE: < 0.1 KU/L
ALLERGEN TIMOTHY GRASS: < 0.1 KU/L
ALLERGEN TREE SYCAMORE: < 0.1 KU/L
ALLERGEN WALNUT TREE IGE: < 0.1 KU/L
ALLERGEN WEED, PIGWEED IGE: < 0.1 KU/L
ALLERGEN WHITE ASH: < 0.1 KU/L
ALLERGEN WHITE MULBERRY TREE, IGE: < 0.1 KU/L
ALLERGEN, FUNGI/MOLD: < 0.1 KU/L
ALLERGEN, TREE, OAK: < 0.1 KU/L
IMMUNOGLOBULIN E: 5 KU/L

## 2020-03-25 PROBLEM — E03.9 HYPOTHYROID: Status: RESOLVED | Noted: 2020-03-25 | Resolved: 2020-03-24

## 2020-04-23 ENCOUNTER — OFFICE VISIT (OUTPATIENT)
Dept: PULMONOLOGY | Age: 70
End: 2020-04-23
Payer: MEDICARE

## 2020-04-23 VITALS
HEART RATE: 66 BPM | WEIGHT: 149.2 LBS | OXYGEN SATURATION: 96 % | BODY MASS INDEX: 23.98 KG/M2 | DIASTOLIC BLOOD PRESSURE: 78 MMHG | SYSTOLIC BLOOD PRESSURE: 132 MMHG | HEIGHT: 66 IN | TEMPERATURE: 96 F

## 2020-04-23 PROCEDURE — G8420 CALC BMI NORM PARAMETERS: HCPCS | Performed by: INTERNAL MEDICINE

## 2020-04-23 PROCEDURE — G8400 PT W/DXA NO RESULTS DOC: HCPCS | Performed by: INTERNAL MEDICINE

## 2020-04-23 PROCEDURE — 99213 OFFICE O/P EST LOW 20 MIN: CPT | Performed by: INTERNAL MEDICINE

## 2020-04-23 PROCEDURE — 1090F PRES/ABSN URINE INCON ASSESS: CPT | Performed by: INTERNAL MEDICINE

## 2020-04-23 PROCEDURE — G8427 DOCREV CUR MEDS BY ELIG CLIN: HCPCS | Performed by: INTERNAL MEDICINE

## 2020-04-23 PROCEDURE — 3017F COLORECTAL CA SCREEN DOC REV: CPT | Performed by: INTERNAL MEDICINE

## 2020-04-23 PROCEDURE — 1036F TOBACCO NON-USER: CPT | Performed by: INTERNAL MEDICINE

## 2020-04-23 PROCEDURE — 1123F ACP DISCUSS/DSCN MKR DOCD: CPT | Performed by: INTERNAL MEDICINE

## 2020-04-23 PROCEDURE — 4040F PNEUMOC VAC/ADMIN/RCVD: CPT | Performed by: INTERNAL MEDICINE

## 2020-04-23 ASSESSMENT — ENCOUNTER SYMPTOMS
PHOTOPHOBIA: 0
NAUSEA: 0
CONSTIPATION: 0
APNEA: 0
BLOOD IN STOOL: 0
BACK PAIN: 0
VOMITING: 0
VOICE CHANGE: 0
ABDOMINAL PAIN: 0
FACIAL SWELLING: 0
ABDOMINAL DISTENTION: 0

## 2020-04-23 NOTE — PROGRESS NOTES
Breath sounds: Normal breath sounds. No stridor. No wheezing or rales. Chest:      Chest wall: No tenderness. Abdominal:      General: Abdomen is flat. Musculoskeletal: Normal range of motion. General: No tenderness. Lymphadenopathy:      Cervical: No cervical adenopathy. Skin:     General: Skin is warm. Coloration: Skin is not pale. Findings: No erythema or rash. Neurological:      Mental Status: She is alert and oriented to person, place, and time. Cranial Nerves: No cranial nerve deficit. Psychiatric:         Behavior: Behavior normal.         Thought Content: Thought content normal.         Judgment: Judgment normal.       RAST panel:    Allergen Fungi/Mold A. Alternata Ige < 0.10  <=0.34 kU/L Final 02/28/2020  2:04 PM [facility]   Allergen Box Elder < 0.10  <=0.34 kU/L Final 02/28/2020  2:04 PM [facility]   Cat Dander IgE < 0.10  <=0.34 kU/L Final 02/28/2020  2:04 PM [facility]   ALLERGEN MOUNTAIN CEDAR < 0.10  <=0.34 kU/L Final 02/28/2020  2:04 PM [facility]   ALLERGEN COTTONWOOD IGE < 0.10  <=0.34 kU/L Final 02/28/2020  2:04 PM [facility]   ALLERGEN WEED, PIGWEED IGE < 0.10  <=0.34 kU/L Final 02/28/2020  2:04 PM [facility]   [place] Thistle IgE < 0.10  <=0.34 kU/L Final 02/28/2020  2:04 PM [facility]   ALLERGEN HAKEEM GRASS < 0.10  <=0.34 kU/L Final 02/28/2020  2:04 PM [facility]   Allergen, Fungi/Mold < 0.10  <=0.34 kU/L Final 02/28/2020  2:04 PM [facility]   Elm IgE < 0.10  <=0.34 kU/L Final 02/28/2020  2:04 PM [facility]   Allergen, Tree, Oak < 0.10  <=0.34 kU/L Final 02/28/2020  2:04 PM [facility]   ALLERGEN BIRCH IgE < 0.10  <=0.34 kU/L Final 02/28/2020  2:04 PM [facility]   Allergen Fungi/Mold A.  Fumigatus IgE < 0.10  <=0.34 kU/L Final 02/28/2020  2:04 PM [facility]   Mite Dust Pteronyssinus IgE < 0.10  <=0.34 kU/L Final 02/28/2020  2:04 PM [facility]   ALLERGEN D. FARINAE (DUST MITE) < 0.10  <=0.34 kU/L Final 02/28/2020  2:04 PM [facility]   Bermuda Grass IgE < 0.10  <=0.34 kU/L Final 02/28/2020  2:04 PM [facility]   Allergen White Santhosh < 0.10  <=0.34 kU/L relevance is undetermined. Even though increasing   ranges are reflective of increasing concentrations of   allergen-specific IgE, these concentrations may not correlate with   the degree of clinical response or skin testing results when   challenged with a specific allergen. The correlation of allergy   laboratory results with clinical history and in vivo reactivity to   specific allergens is essential. A negative test may not rule out   clinical allergy or even anaphylaxis. Performed by [facility] , [id number] [facility] [phone]   www. Mitesh Gambino MD, Lab. Director      MCCT:                                Assessment:      No evidence of COPD or active ILD  Negative MCCT/Normal FeNO--which points against RAD  Reduced DLCO  Chronic cough has resolved removing dairy products from diet.   Doubt active lung disease      Plan:      Continue Albuterol prn   RTC for change in condition

## 2020-04-29 ENCOUNTER — VIRTUAL VISIT (OUTPATIENT)
Dept: FAMILY MEDICINE CLINIC | Age: 70
End: 2020-04-29
Payer: MEDICARE

## 2020-04-29 PROCEDURE — 4040F PNEUMOC VAC/ADMIN/RCVD: CPT | Performed by: FAMILY MEDICINE

## 2020-04-29 PROCEDURE — G8428 CUR MEDS NOT DOCUMENT: HCPCS | Performed by: FAMILY MEDICINE

## 2020-04-29 PROCEDURE — 1123F ACP DISCUSS/DSCN MKR DOCD: CPT | Performed by: FAMILY MEDICINE

## 2020-04-29 PROCEDURE — 99214 OFFICE O/P EST MOD 30 MIN: CPT | Performed by: FAMILY MEDICINE

## 2020-04-29 PROCEDURE — G8400 PT W/DXA NO RESULTS DOC: HCPCS | Performed by: FAMILY MEDICINE

## 2020-04-29 PROCEDURE — 1090F PRES/ABSN URINE INCON ASSESS: CPT | Performed by: FAMILY MEDICINE

## 2020-04-29 PROCEDURE — 3017F COLORECTAL CA SCREEN DOC REV: CPT | Performed by: FAMILY MEDICINE

## 2020-04-29 RX ORDER — ROSUVASTATIN CALCIUM 5 MG/1
5 TABLET, COATED ORAL DAILY
Qty: 90 TABLET | Refills: 1 | Status: SHIPPED | OUTPATIENT
Start: 2020-04-29 | End: 2020-10-12

## 2020-04-29 RX ORDER — LEVOTHYROXINE SODIUM 0.05 MG/1
TABLET ORAL
Qty: 90 TABLET | Refills: 1 | Status: SHIPPED | OUTPATIENT
Start: 2020-04-29 | End: 2020-10-12 | Stop reason: SDUPTHER

## 2020-04-29 RX ORDER — ZOSTER VACCINE RECOMBINANT, ADJUVANTED 50 MCG/0.5
0.5 KIT INTRAMUSCULAR SEE ADMIN INSTRUCTIONS
Qty: 0.5 ML | Refills: 0 | Status: SHIPPED | OUTPATIENT
Start: 2020-04-29 | End: 2020-04-30

## 2020-04-29 RX ORDER — LISINOPRIL 10 MG/1
10 TABLET ORAL DAILY
Qty: 90 TABLET | Refills: 1 | Status: SHIPPED | OUTPATIENT
Start: 2020-04-29 | End: 2020-10-12 | Stop reason: SDUPTHER

## 2020-04-29 RX ORDER — CLOBETASOL PROPIONATE 0.5 MG/G
OINTMENT TOPICAL
Qty: 60 G | Refills: 0 | Status: SHIPPED | OUTPATIENT
Start: 2020-04-29 | End: 2020-08-03

## 2020-04-29 NOTE — PROGRESS NOTES
2020    TELEHEALTH EVALUATION -- Audio/Visual (During JEIYQ-43 public health emergency)    HPI:    Vianca Deal (:  1950) has requested an audio/video evaluation for the following concern(s):    Today she is requesting a prescription for the Shingrix immunization. Hypertension:  She is taking Lisinopril 10 mg 1 tablet PO daily and she denies any side effects from it. She tries to be adherent to low salt diet. Her blood pressure is well controlled at home. Last reading has been 110s over 70 today was 120/80. Cardiovascular risk factors: hypertension. Use of agents associated with hypertension: none.      Hypothyroidism:   She is taking Synthroid 50 ug 1 tablet PO daily, on a empty stomach early in the morning, by itself. She denies any side effects from it. Last TSH done 2019 was 1.260.          Hyperlipidemia:   She is taking Crestor 5 mg 1 tab by mouth daily last lipid profile done 2019 show a total cholesterol 141, triglycerides 83, HDL 67 and LDL 51. There is a family history of early ischemic heart disease. Her sister had a MI at age 64. She tries to be adherent to low cholesterol diet. She is not exercising regularly, but she is active. Her 10 year risk was 8.4%     She has history of contact dermatitis and she has seen a dermatologist in the past,  but she has not been able to go back. She is having an acute exacerbation  at the present time and she would like to have a refill of clobetasol that she used sporadically when it flared up and not for more than a week. Review of Systems   Negative for fever chills. Negative for congestion or drainage. Negative for chest pain or shortness of breath. Negative for abdominal pain, diarrhea or constipation. Positive for skin rash      Prior to Visit Medications    Medication Sig Taking?  Authorizing Provider   rosuvastatin (CRESTOR) 5 MG tablet Take 1 tablet by mouth daily Yes Taz Donato MD   lisinopril

## 2020-06-05 ENCOUNTER — VIRTUAL VISIT (OUTPATIENT)
Dept: FAMILY MEDICINE CLINIC | Age: 70
End: 2020-06-05
Payer: MEDICARE

## 2020-06-05 ENCOUNTER — NURSE ONLY (OUTPATIENT)
Dept: LAB | Age: 70
End: 2020-06-05

## 2020-06-05 LAB
AMORPHOUS: ABNORMAL
BACTERIA: ABNORMAL
BILIRUBIN URINE: NEGATIVE
BLOOD, URINE: NEGATIVE
CASTS: ABNORMAL /LPF
CASTS: ABNORMAL /LPF
CHARACTER, URINE: CLEAR
COLOR: YELLOW
CRYSTALS: ABNORMAL
EPITHELIAL CELLS, UA: ABNORMAL /HPF
GLUCOSE, URINE: NEGATIVE MG/DL
KETONES, URINE: NEGATIVE
LEUKOCYTE ESTERASE, URINE: ABNORMAL
MISCELLANEOUS LAB TEST RESULT: ABNORMAL
NITRITE, URINE: NEGATIVE
PH UA: 7 (ref 5–9)
PROTEIN UA: NEGATIVE MG/DL
RBC URINE: ABNORMAL /HPF
RENAL EPITHELIAL, UA: ABNORMAL
SPECIFIC GRAVITY UA: < 1.005 (ref 1–1.03)
UROBILINOGEN, URINE: 0.2 EU/DL (ref 0–1)
WBC UA: ABNORMAL /HPF
YEAST: ABNORMAL

## 2020-06-05 PROCEDURE — 99213 OFFICE O/P EST LOW 20 MIN: CPT | Performed by: FAMILY MEDICINE

## 2020-06-05 PROCEDURE — G8428 CUR MEDS NOT DOCUMENT: HCPCS | Performed by: FAMILY MEDICINE

## 2020-06-05 PROCEDURE — 4040F PNEUMOC VAC/ADMIN/RCVD: CPT | Performed by: FAMILY MEDICINE

## 2020-06-05 PROCEDURE — 1090F PRES/ABSN URINE INCON ASSESS: CPT | Performed by: FAMILY MEDICINE

## 2020-06-05 PROCEDURE — 3017F COLORECTAL CA SCREEN DOC REV: CPT | Performed by: FAMILY MEDICINE

## 2020-06-05 PROCEDURE — G8400 PT W/DXA NO RESULTS DOC: HCPCS | Performed by: FAMILY MEDICINE

## 2020-06-05 PROCEDURE — 1123F ACP DISCUSS/DSCN MKR DOCD: CPT | Performed by: FAMILY MEDICINE

## 2020-06-05 RX ORDER — NITROFURANTOIN 25; 75 MG/1; MG/1
100 CAPSULE ORAL 2 TIMES DAILY
Qty: 14 CAPSULE | Refills: 0 | Status: SHIPPED | OUTPATIENT
Start: 2020-06-05 | End: 2020-06-15

## 2020-06-05 RX ORDER — FLAVOXATE HYDROCHLORIDE 100 MG/1
100 TABLET ORAL 3 TIMES DAILY PRN
Qty: 15 TABLET | Refills: 0 | Status: SHIPPED | OUTPATIENT
Start: 2020-06-05 | End: 2020-08-03

## 2020-06-07 LAB
ORGANISM: ABNORMAL
URINE CULTURE, ROUTINE: ABNORMAL

## 2020-06-09 ENCOUNTER — TELEPHONE (OUTPATIENT)
Dept: FAMILY MEDICINE CLINIC | Age: 70
End: 2020-06-09

## 2020-06-09 NOTE — TELEPHONE ENCOUNTER
Would is interested in getting the Covid Antibiody test. States she was sick in march, and would like to know. She is wanting to go back to work, but with her age she just wants to check.

## 2020-06-10 ENCOUNTER — NURSE ONLY (OUTPATIENT)
Dept: LAB | Age: 70
End: 2020-06-10

## 2020-06-12 LAB — SARS-COV-2, IGG: NEGATIVE

## 2020-07-07 ENCOUNTER — TELEPHONE (OUTPATIENT)
Dept: NEUROLOGY | Age: 70
End: 2020-07-07

## 2020-07-07 NOTE — TELEPHONE ENCOUNTER
Patient sent [employer] message stating for the last 5 days she is having right temple, cheek, eye, and ear pain. She is having right sided headache. She has history of left sided trigeminal nerve pain. She has tried OTC with no relief. She has occasional right eye blurred vision. Please advise. Thank you.

## 2020-07-08 ENCOUNTER — OFFICE VISIT (OUTPATIENT)
Dept: NEUROLOGY | Age: 70
End: 2020-07-08
Payer: MEDICARE

## 2020-07-08 ENCOUNTER — NURSE ONLY (OUTPATIENT)
Dept: LAB | Age: 70
End: 2020-07-08

## 2020-07-08 VITALS
OXYGEN SATURATION: 98 % | SYSTOLIC BLOOD PRESSURE: 130 MMHG | HEIGHT: 66 IN | DIASTOLIC BLOOD PRESSURE: 84 MMHG | HEART RATE: 72 BPM | BODY MASS INDEX: 23.66 KG/M2 | WEIGHT: 147.2 LBS

## 2020-07-08 LAB — SEDIMENTATION RATE, ERYTHROCYTE: 20 MM/HR (ref 0–20)

## 2020-07-08 PROCEDURE — G8420 CALC BMI NORM PARAMETERS: HCPCS | Performed by: PSYCHIATRY & NEUROLOGY

## 2020-07-08 PROCEDURE — 3017F COLORECTAL CA SCREEN DOC REV: CPT | Performed by: PSYCHIATRY & NEUROLOGY

## 2020-07-08 PROCEDURE — G8400 PT W/DXA NO RESULTS DOC: HCPCS | Performed by: PSYCHIATRY & NEUROLOGY

## 2020-07-08 PROCEDURE — 4040F PNEUMOC VAC/ADMIN/RCVD: CPT | Performed by: PSYCHIATRY & NEUROLOGY

## 2020-07-08 PROCEDURE — 1036F TOBACCO NON-USER: CPT | Performed by: PSYCHIATRY & NEUROLOGY

## 2020-07-08 PROCEDURE — G8427 DOCREV CUR MEDS BY ELIG CLIN: HCPCS | Performed by: PSYCHIATRY & NEUROLOGY

## 2020-07-08 PROCEDURE — 1123F ACP DISCUSS/DSCN MKR DOCD: CPT | Performed by: PSYCHIATRY & NEUROLOGY

## 2020-07-08 PROCEDURE — 1090F PRES/ABSN URINE INCON ASSESS: CPT | Performed by: PSYCHIATRY & NEUROLOGY

## 2020-07-08 PROCEDURE — 99214 OFFICE O/P EST MOD 30 MIN: CPT | Performed by: PSYCHIATRY & NEUROLOGY

## 2020-07-08 RX ORDER — CARBAMAZEPINE 100 MG/1
100 TABLET, EXTENDED RELEASE ORAL 2 TIMES DAILY
Qty: 60 TABLET | Refills: 3 | Status: SHIPPED | OUTPATIENT
Start: 2020-07-08 | End: 2020-10-12

## 2020-07-08 NOTE — PROGRESS NOTES
NEUROLOGY OUT PATIENT FOLLOW UP NOTE:  20204:32 PM    Mic Pittman is here for follow up for trouble, ataxic gait, hyperreflexia. She feels her balance has improved. She denies any falls. She is working with physical therapy. She comes in today by herself to discuss results of testing performed and the plan of care going forward. ROS:  Respiratory : no cough, no shortness of breath  Cardiac: no chest pain. No palpitations. Renal : no flank pain, no hematuria    Skin: no rash      Allergies   Allergen Reactions    Amoxicillin Diarrhea    Darvon [Propoxyphene Hcl] Other (See Comments)     Feeling of extremity numbness.  Seasonal       cat, grass, dust, mold    Sudafed [Pseudoephedrine Hcl] Other (See Comments)     Restless feeling.        Current Outpatient Medications:     flavoxate (URISPAS) 100 MG tablet, Take 1 tablet by mouth 3 times daily as needed for Muscle spasms, Disp: 15 tablet, Rfl: 0    rosuvastatin (CRESTOR) 5 MG tablet, Take 1 tablet by mouth daily, Disp: 90 tablet, Rfl: 1    lisinopril (PRINIVIL;ZESTRIL) 10 MG tablet, Take 1 tablet by mouth daily, Disp: 90 tablet, Rfl: 1    levothyroxine (SYNTHROID) 50 MCG tablet, TAKE 1 TABLET DAILY, Disp: 90 tablet, Rfl: 1    fluticasone (FLONASE) 50 MCG/ACT nasal spray, 2 sprays by Nasal route daily, Disp: 1 Bottle, Rfl: 5    Omega-3 Fatty Acids (FISH OIL PO), Take by mouth, Disp: , Rfl:     albuterol sulfate HFA (PROVENTIL HFA) 108 (90 Base) MCG/ACT inhaler, Inhale 2 puffs into the lungs every 6 hours as needed for Wheezing, Disp: 1 Inhaler, Rfl: 3    acetaminophen (TYLENOL) 325 MG tablet, Take 650 mg by mouth every 6 hours as needed for Pain, Disp: , Rfl:     Coenzyme Q10 (COQ-10 PO), Take 1 tablet by mouth daily , Disp: , Rfl:     cyclobenzaprine (FLEXERIL) 5 MG tablet, Take 1 tablet by mouth 3 times daily as needed (back pain) Rx  09, Disp: 90 tablet, Rfl: 0    aspirin 81 MG tablet, Take 81 mg by mouth daily, Disp: , Rfl:     calcium carbonate (OSCAL) 500 MG TABS tablet, Take 500 mg by mouth daily, Disp: , Rfl:     Cholecalciferol (D3 VITAMIN PO), Take 1,000 Units by mouth daily , Disp: , Rfl:     vitamin C (ASCORBIC ACID) 500 MG tablet, Take 500 mg by mouth daily, Disp: , Rfl:     Krill Oil 500 MG CAPS, Take 1 tablet by mouth daily, Disp: , Rfl:     Loperamide HCl (IMODIUM PO), Take  by mouth daily as needed. , Disp: , Rfl:     Blood Pressure KIT, by Does not apply route., Disp: 1 kit, Rfl: 0    carBAMazepine (TEGRETOL-XR) 100 MG extended release tablet, Take 1 tablet by mouth 2 times daily (Patient not taking: Reported on 7/8/2020), Disp: 60 tablet, Rfl: 3    clobetasol (TEMOVATE) 0.05 % ointment, Apply topically 2 times daily. (Patient not taking: Reported on 7/8/2020), Disp: 60 g, Rfl: 0    Ciclopirox (PENLAC EX), Apply topically, Disp: , Rfl:       PE:   Vitals:    07/08/20 1606   BP: 130/84   Pulse: 72   SpO2: 98%   Weight: 147 lb 3.2 oz (66.8 kg)   Height: 5' 6\" (1.676 m)     General Appearance:  awake, alert, oriented, in no acute distress  Gen: NAD, Language is Intact. Skin: no rash, lesion, dry  to touch. warm  Head: no rash, no icterus  Neck: There is no carotid bruits. The Neck is supple. There is no neck lymphadenopathy. Neuro: CN 2-12 grossly intact with no focal deficits. Power 5/5 Throughout symmetric, Reflexes are +2 symmetric. Long tracts are intact. Cerebellar exam is Intact. Sensory exam is intact to light touch. Gait is intact. Musculoskeletal:  Has no hand arthritis, no limitation of ROM in any of the four extremities. Lower extremities no edema  The abdomen is soft,  intact bowel sounds.          DATA:  Results for orders placed or performed in visit on 06/10/20   Covid-19, Antibody   Result Value Ref Range    SARS-CoV-2, IgG Negative Negative          Results for orders placed during the hospital encounter of 11/08/19   MRI Cervical Spine WO Contrast    Narrative PROCEDURE: MRI CERVICAL SPINE WO CONTRAST    CLINICAL INFORMATION: Ataxic gait, Hyperreflexia. COMPARISON: None available. TECHNIQUE: Sagittal T1, T2 and STIR sequences were obtained through the cervical spine. Axial fast and echo and gradient echo T2-weighted images were obtained. FINDINGS:    The cervical spine is imaged from the craniocervical junction to the superior aspect of T3. No suspicious finding is identified at the cervical medullary junction. No abnormal signal or expansion is present within the cervical spinal cord. There is preservation of the expected cervical lordosis. Minimal anterolisthesis is present of C6 on C7 and C7 on T1. No acute fracture or suspicious marrow replacing lesion is identified. Mild degenerative facet arthropathy is present at every level. With regards to the disc spaces, at C2-C3, there is a disc bulge. The spinal canal is patent. The neural foramina are also patent. At C3-C4, there is a disc bulge. The spinal canal and neural foramina are patent. At C4-C5, there is a disc osteophyte complex. The spinal canal is patent. Uncovertebral joint spurring causes mild neural foraminal narrowing on the right without significant neural foraminal narrowing on the left. At C5-C6, there is a disc osteophyte complex causing mild spinal canal narrowing. The neural foramina are patent. At C6-C7, the spinal canal and neural foramina are patent. At C7-T1, the spinal canal and neural foramina are patent. No suspicious finding is identified within the visualized paraspinal soft tissues. Impression  Multilevel degenerative changes are present throughout the cervical spine and are further discussed by level in the findings. These are most significant at C5-C6 where there is a disc osteophyte complex causing mild spinal canal narrowing.  Degenerative   facet arthropathy and uncovertebral joint spurring is also present resulting in mild neural foraminal narrowing on the right at C4-C5.          **This report has been created using voice recognition software. It may contain minor errors which are inherent in voice recognition technology. **    Final report electronically signed by Dr. Srinivas Hagen on 11/8/2019 10:16 AM     Results for orders placed during the hospital encounter of 11/01/19   MRI BRAIN WO CONTRAST    Narrative PROCEDURE: MRI BRAIN WO CONTRAST    CLINICAL INFORMATION Ataxia. COMPARISON: MRI of the brain dated July 21, 2008. TECHNIQUE: Multiplanar and multiple spin echo MRI images were obtained of the brain without contrast.    FINDINGS:    There is mild prominence of the sulcal spaces and ventricular system secondary to generalized, age-related parenchymal volume loss. Mild, scattered punctate foci of hyperintense T2 signal are present throughout the deep cerebral white matter. These   appear more numerous compared to the prior exam. No restricted diffusion is identified within the brain. No abnormal susceptibility is present. The ventricles are similar in size and morphology without evidence of hydrocephalus. No mass, mass effect or extra-axial fluid collection is identified. The basal cisterns and visualized vascular flow voids are patent. The pituitary, brain stem and   cervical medullary junction are within normal limits. The visualized orbits and temporal bone structures are unremarkable. The paranasal sinuses are within normal limits. Impression  No acute intracranial abnormality is identified. Senescent changes are present and there has been interval increase in the amount of scattered, punctate foci of hyperintense T2 signal within the deep cerebral white matter. These likely correspond to   mild sequela of chronic microvascular ischemic angiopathy. **This report has been created using voice recognition software. It may contain minor errors which are inherent in voice recognition technology. **    Final report electronically signed by  Brenharish Granda on 11/1/2019 4:22 PM         Assessment:     Diagnosis Orders   1. Right-sided face pain     2. Ataxic gait          She is called today stating right facial pain, no vision change, no jaw claudication, sep temp art pulses are intact. No jaw pain on movement. with her ambulation. Her balance has improved. No falls. She went to physical therapy and feels that has helped with her balance. Her MRI cervival spine showed mild spinal canal narrowing and degerative changes. Her connective tissue screening showed no concerning findings. After detailed discussion with patient we agreed on the following plan. Plan:  1. Sedrate. 2. Tegretol 100 mg twice a day. 3. Follow up in one week. 4. Call if any questions or concerns. Please call if any questions.      Tonja Gaucher MD

## 2020-07-08 NOTE — TELEPHONE ENCOUNTER
Need to do a sedrate, today please. We can also start her on Tegretol 100 mg twice a day. Schedule follow up this week please, probably needs to be in person.    Sherin Golden MD

## 2020-07-08 NOTE — PATIENT INSTRUCTIONS
1. Sedrate. 2. Tegretol 100 mg twice a day. 3. Follow up in one week. 4. Call if any questions or concerns.

## 2020-07-15 ENCOUNTER — OFFICE VISIT (OUTPATIENT)
Dept: NEUROLOGY | Age: 70
End: 2020-07-15
Payer: MEDICARE

## 2020-07-15 VITALS
HEART RATE: 64 BPM | SYSTOLIC BLOOD PRESSURE: 128 MMHG | WEIGHT: 156.2 LBS | BODY MASS INDEX: 25.1 KG/M2 | HEIGHT: 66 IN | DIASTOLIC BLOOD PRESSURE: 72 MMHG

## 2020-07-15 PROCEDURE — G8400 PT W/DXA NO RESULTS DOC: HCPCS | Performed by: PSYCHIATRY & NEUROLOGY

## 2020-07-15 PROCEDURE — 1090F PRES/ABSN URINE INCON ASSESS: CPT | Performed by: PSYCHIATRY & NEUROLOGY

## 2020-07-15 PROCEDURE — 99213 OFFICE O/P EST LOW 20 MIN: CPT | Performed by: PSYCHIATRY & NEUROLOGY

## 2020-07-15 PROCEDURE — 4040F PNEUMOC VAC/ADMIN/RCVD: CPT | Performed by: PSYCHIATRY & NEUROLOGY

## 2020-07-15 PROCEDURE — G8427 DOCREV CUR MEDS BY ELIG CLIN: HCPCS | Performed by: PSYCHIATRY & NEUROLOGY

## 2020-07-15 PROCEDURE — G8417 CALC BMI ABV UP PARAM F/U: HCPCS | Performed by: PSYCHIATRY & NEUROLOGY

## 2020-07-15 PROCEDURE — 3017F COLORECTAL CA SCREEN DOC REV: CPT | Performed by: PSYCHIATRY & NEUROLOGY

## 2020-07-15 PROCEDURE — 1036F TOBACCO NON-USER: CPT | Performed by: PSYCHIATRY & NEUROLOGY

## 2020-07-15 PROCEDURE — 1123F ACP DISCUSS/DSCN MKR DOCD: CPT | Performed by: PSYCHIATRY & NEUROLOGY

## 2020-07-15 RX ORDER — IBUPROFEN 200 MG
200 TABLET ORAL EVERY 6 HOURS PRN
COMMUNITY
End: 2021-10-13

## 2020-07-15 NOTE — PROGRESS NOTES
NEUROLOGY OUT PATIENT FOLLOW UP NOTE:  7/15/90664:30 PM    Saeed Storm is here for follow up for right face pain, ataxic gait, hyperreflexia. She reports she is doing better. She denies any falls. Her face pain is better, she is on Tegretol. She feels her balance has improved. She denies any falls. She has benefited from working with physical therapy. She comes in today by herself to discuss results of testing performed and the plan of care going forward. ROS:  Respiratory : no cough, no shortness of breath  Cardiac: no chest pain. No palpitations. Renal : no flank pain, no hematuria    Skin: no rash      Allergies   Allergen Reactions    Amoxicillin Diarrhea    Darvon [Propoxyphene Hcl] Other (See Comments)     Feeling of extremity numbness.  Seasonal       cat, grass, dust, mold    Sudafed [Pseudoephedrine Hcl] Other (See Comments)     Restless feeling.        Current Outpatient Medications:     ibuprofen (ADVIL;MOTRIN) 200 MG tablet, Take 200 mg by mouth every 6 hours as needed for Pain, Disp: , Rfl:     carBAMazepine (TEGRETOL-XR) 100 MG extended release tablet, Take 1 tablet by mouth 2 times daily, Disp: 60 tablet, Rfl: 3    rosuvastatin (CRESTOR) 5 MG tablet, Take 1 tablet by mouth daily, Disp: 90 tablet, Rfl: 1    lisinopril (PRINIVIL;ZESTRIL) 10 MG tablet, Take 1 tablet by mouth daily, Disp: 90 tablet, Rfl: 1    levothyroxine (SYNTHROID) 50 MCG tablet, TAKE 1 TABLET DAILY, Disp: 90 tablet, Rfl: 1    fluticasone (FLONASE) 50 MCG/ACT nasal spray, 2 sprays by Nasal route daily, Disp: 1 Bottle, Rfl: 5    Omega-3 Fatty Acids (FISH OIL PO), Take by mouth, Disp: , Rfl:     albuterol sulfate HFA (PROVENTIL HFA) 108 (90 Base) MCG/ACT inhaler, Inhale 2 puffs into the lungs every 6 hours as needed for Wheezing, Disp: 1 Inhaler, Rfl: 3    acetaminophen (TYLENOL) 325 MG tablet, Take 650 mg by mouth every 6 hours as needed for Pain, Disp: , Rfl:     Coenzyme Q10 (COQ-10 PO), Take 1 tablet by mouth daily , Disp: , Rfl:     cyclobenzaprine (FLEXERIL) 5 MG tablet, Take 1 tablet by mouth 3 times daily as needed (back pain) Rx  09, Disp: 90 tablet, Rfl: 0    aspirin 81 MG tablet, Take 81 mg by mouth daily, Disp: , Rfl:     calcium carbonate (OSCAL) 500 MG TABS tablet, Take 500 mg by mouth daily, Disp: , Rfl:     Cholecalciferol (D3 VITAMIN PO), Take 1,000 Units by mouth daily , Disp: , Rfl:     vitamin C (ASCORBIC ACID) 500 MG tablet, Take 500 mg by mouth daily, Disp: , Rfl:     Krill Oil 500 MG CAPS, Take 1 tablet by mouth daily, Disp: , Rfl:     Loperamide HCl (IMODIUM PO), Take  by mouth daily as needed. , Disp: , Rfl:     Blood Pressure KIT, by Does not apply route., Disp: 1 kit, Rfl: 0    flavoxate (URISPAS) 100 MG tablet, Take 1 tablet by mouth 3 times daily as needed for Muscle spasms (Patient not taking: Reported on 7/15/2020), Disp: 15 tablet, Rfl: 0    clobetasol (TEMOVATE) 0.05 % ointment, Apply topically 2 times daily. (Patient not taking: Reported on 2020), Disp: 60 g, Rfl: 0    Ciclopirox (PENLAC EX), Apply topically, Disp: , Rfl:       PE:   Vitals:    07/15/20 1411   BP: 128/72   Site: Left Upper Arm   Position: Sitting   Cuff Size: Small Adult   Pulse: 64   Weight: 156 lb 3.2 oz (70.9 kg)   Height: 5' 6\" (1.676 m)     General Appearance:  awake, alert, oriented, in no acute distress  Gen: NAD, Language is Intact. Skin: no rash, lesion, dry  to touch. warm  Head: no rash, no icterus  Neck: There is no carotid bruits. The Neck is supple. There is no neck lymphadenopathy. Neuro: CN 2-12 grossly intact with no focal deficits. Power 5/5 Throughout symmetric, Reflexes are decreased and symmetric. Long tracts are intact. Cerebellar exam is Intact. Sensory exam is intact to light touch. Gait is intact. Musculoskeletal:  Has no hand arthritis, no limitation of ROM in any of the four extremities. Lower extremities no edema  The abdomen is soft,  intact bowel sounds. DATA:  Results for orders placed or performed in visit on 07/08/20   Sedimentation Rate   Result Value Ref Range    Sed Rate 20 0 - 20 mm/hr          Results for orders placed during the hospital encounter of 11/08/19   MRI Cervical Spine WO Contrast    Narrative PROCEDURE: MRI CERVICAL SPINE WO CONTRAST    CLINICAL INFORMATION: Ataxic gait, Hyperreflexia. COMPARISON: None available. TECHNIQUE: Sagittal T1, T2 and STIR sequences were obtained through the cervical spine. Axial fast and echo and gradient echo T2-weighted images were obtained. FINDINGS:    The cervical spine is imaged from the craniocervical junction to the superior aspect of T3. No suspicious finding is identified at the cervical medullary junction. No abnormal signal or expansion is present within the cervical spinal cord. There is preservation of the expected cervical lordosis. Minimal anterolisthesis is present of C6 on C7 and C7 on T1. No acute fracture or suspicious marrow replacing lesion is identified. Mild degenerative facet arthropathy is present at every level. With regards to the disc spaces, at C2-C3, there is a disc bulge. The spinal canal is patent. The neural foramina are also patent. At C3-C4, there is a disc bulge. The spinal canal and neural foramina are patent. At C4-C5, there is a disc osteophyte complex. The spinal canal is patent. Uncovertebral joint spurring causes mild neural foraminal narrowing on the right without significant neural foraminal narrowing on the left. At C5-C6, there is a disc osteophyte complex causing mild spinal canal narrowing. The neural foramina are patent. At C6-C7, the spinal canal and neural foramina are patent. At C7-T1, the spinal canal and neural foramina are patent. No suspicious finding is identified within the visualized paraspinal soft tissues.       Impression  Multilevel degenerative changes are present throughout the cervical spine and are further matter. These likely correspond to   mild sequela of chronic microvascular ischemic angiopathy. **This report has been created using voice recognition software. It may contain minor errors which are inherent in voice recognition technology. **    Final report electronically signed by Dr. Saida Mahmood on 11/1/2019 4:22 PM     Sedrate =20     Assessment:     Diagnosis Orders   1. Right-sided face pain     2. Ataxic gait     3. Hyperreflexia          She is doing better with the right face pain. She is on tegretol tolerated well, no side effects to the medication. called today reporting increased right facial pain, no vision change, no jaw claudication, her superficial temporal artery pulses are symmetric and intact. She denies jaw pain on movement. with her ambulation. Her balance has improved. No falls. She went to physical therapy and feels that has helped with her balance. Her MRI cervical spine showed mild spinal canal narrowing and degerative changes. Her connective tissue screening showed no concerning findings. sedrate was 20. After detailed discussion with patient we agreed on the following plan. Plan:  1. Continue with Tegretol 100 mg twice a day. 2. Follow up in two months. 3. Call if any questions or concerns. Please call if any questions.      Destiny Brock MD

## 2020-07-15 NOTE — PATIENT INSTRUCTIONS
1. Continue with Tegretol 100 mg twice a day. 2. Follow up in two months. 3. Call if any questions or concerns.

## 2020-08-03 ENCOUNTER — OFFICE VISIT (OUTPATIENT)
Dept: FAMILY MEDICINE CLINIC | Age: 70
End: 2020-08-03
Payer: MEDICARE

## 2020-08-03 VITALS
RESPIRATION RATE: 12 BRPM | SYSTOLIC BLOOD PRESSURE: 117 MMHG | DIASTOLIC BLOOD PRESSURE: 83 MMHG | TEMPERATURE: 97.3 F | WEIGHT: 146.2 LBS | BODY MASS INDEX: 23.5 KG/M2 | HEART RATE: 66 BPM | HEIGHT: 66 IN

## 2020-08-03 PROCEDURE — 1123F ACP DISCUSS/DSCN MKR DOCD: CPT | Performed by: FAMILY MEDICINE

## 2020-08-03 PROCEDURE — G0439 PPPS, SUBSEQ VISIT: HCPCS | Performed by: FAMILY MEDICINE

## 2020-08-03 PROCEDURE — 4040F PNEUMOC VAC/ADMIN/RCVD: CPT | Performed by: FAMILY MEDICINE

## 2020-08-03 PROCEDURE — 3017F COLORECTAL CA SCREEN DOC REV: CPT | Performed by: FAMILY MEDICINE

## 2020-08-03 ASSESSMENT — PATIENT HEALTH QUESTIONNAIRE - PHQ9
SUM OF ALL RESPONSES TO PHQ QUESTIONS 1-9: 0
SUM OF ALL RESPONSES TO PHQ QUESTIONS 1-9: 0

## 2020-08-03 ASSESSMENT — LIFESTYLE VARIABLES: HOW OFTEN DO YOU HAVE A DRINK CONTAINING ALCOHOL: 0

## 2020-08-03 NOTE — PROGRESS NOTES
Advance Care Planning   Advanced Care Planning: Discussed the patients choices for care and treatment in case of a health event that adversely affects decision-making abilities. Also discussed the patients long-term treatment options. Reviewed with the patient the [address] [place] [address] & [facility] Declaration forms  Reviewed the process of designating a competent adult as an Agent (or -in-fact) that could take make health care decisions for the patient if incompetent. Patient was asked to complete the declaration forms, either acknowledge the forms by a public notary or an eligible witness and provide a signed copy to the practice office. Time spent (minutes): 2    Cardiovascular Disease Risk Counseling: Assessed the patient's risk to develop cardiovascular disease and reviewed main risk factors. Reviewed steps to reduce disease risk including:   · Quitting tobacco use, reducing amount smoked, or not starting the habit  · Making healthy food choices  · Being physically active and gradualy increasing activity levels   · Reduce weight and determine a healthy BMI goal  · Monitor blood pressure and treat if higher than 140/90 mmHg  · Maintain blood total cholesterol levels under 5 mmol/l or 190 mg/dl  · Maintain LDL cholesterol levels under 3.0 mmol/l or 115 mg/dl   · Control blood glucose levels  · Consider taking aspirin (75 mg daily), once blood pressure is controlled   Provided a follow up plan. Time spent (minutes): 3      Medicare Annual Wellness Visit    Name: Marlen Friedman Date: 8/3/2020   MRN: [id number] Sex: Female   Age: 78 y.o. Ethnicity: Non-/Non    : 1950 Race: Maddie Smalls is here for Medicare AWV    Screenings for behavioral, psychosocial and functional/safety risks, and cognitive dysfunction are all negative except as indicated below.  These results, as well as other patient data from the Health Risk Assessment form, are documented in Flowsheets linked to this Encounter. Allergies   Allergen Reactions    Amoxicillin Diarrhea    Darvon [Propoxyphene Hcl] Other (See Comments)     Feeling of extremity numbness.  Seasonal       cat, grass, dust, mold    Sudafed [Pseudoephedrine Hcl] Other (See Comments)     Restless feeling. Prior to Visit Medications    Medication Sig Taking?  Authorizing Provider   ibuprofen (ADVIL;MOTRIN) 200 MG tablet Take 200 mg by mouth every 6 hours as needed for Pain Yes Historical Provider, MD   carBAMazepine (TEGRETOL-XR) 100 MG extended release tablet Take 1 tablet by mouth 2 times daily Yes Pato Roque MD   rosuvastatin (CRESTOR) 5 MG tablet Take 1 tablet by mouth daily Yes Chico Sorto MD   lisinopril (PRINIVIL;ZESTRIL) 10 MG tablet Take 1 tablet by mouth daily Yes Chico Sorto MD   levothyroxine (SYNTHROID) 50 MCG tablet TAKE 1 TABLET DAILY Yes Chico Sorto MD   fluticasone (FLONASE) 50 MCG/ACT nasal spray 2 sprays by Nasal route daily Yes Nabil Franco MD   Omega-3 Fatty Acids (FISH OIL PO) Take by mouth Yes Historical Provider, MD   albuterol sulfate HFA (PROVENTIL HFA) 108 (90 Base) MCG/ACT inhaler Inhale 2 puffs into the lungs every 6 hours as needed for Wheezing Yes Chico Sorto MD   acetaminophen (TYLENOL) 325 MG tablet Take 650 mg by mouth every 6 hours as needed for Pain Yes Historical Provider, MD   Coenzyme Q10 (COQ-10 PO) Take 1 tablet by mouth daily  Yes Historical Provider, MD   cyclobenzaprine (FLEXERIL) 5 MG tablet Take 1 tablet by mouth 3 times daily as needed (back pain) Rx  09 Yes Chico Sorto MD   aspirin 81 MG tablet Take 81 mg by mouth daily Yes Historical Provider, MD   calcium carbonate (OSCAL) 500 MG TABS tablet Take 500 mg by mouth daily Yes Historical Provider, MD   Cholecalciferol (D3 VITAMIN PO) Take 1,000 Units by mouth daily  Yes Historical Provider, MD   vitamin C (ASCORBIC ACID) 500 MG tablet Take 500 mg by mouth daily Yes Historical Provider, MD Krill Oil 500 MG CAPS Take 1 tablet by mouth daily Yes Historical Provider, MD   Loperamide HCl (IMODIUM PO) Take  by mouth daily as needed. Yes Historical Provider, MD   Blood Pressure KIT by Does not apply route.  Yes Nalini Zaidi MD       Past Medical History:   Diagnosis Date    Allergic rhinitis     Arthritis     left thumb    GERD (gastroesophageal reflux disease)     HTN (hypertension) January 2013    Hyperlipemia 2004    Hypothyroid 2009    Levothyroxine    IBS (irritable bowel syndrome)     Knee cap dislocation     broke right knee    Multilevel degenerative disc disease 2009    Takes Andriette Hirschfeld off and on as needed    Osteoarthritis 1982    Osteopenia 2008    PUD (peptic ulcer disease) 2009    Recurrent low back pain 1992    disc disease       Past Surgical History:   Procedure Laterality Date    BUNIONECTOMY  1991    rt    COLONOSCOPY  2005    Due 2014 Dr Jeronimo Garcia    wisdom teeth extraction    HYSTERECTOMY  2001    KNEE ARTHROSCOPY  2004    lt meniscus repair     LAPAROSCOPY  1983    abd infection    SKIN CANCER EXCISION  07-17-13    Dr Mark Rogel  2001       Family History   Problem Relation Age of Onset    High Blood Pressure Father     Other Father 76        abd anyuerism    Heart Disease Father         abdominal aortic aneurysm    Thyroid Disease Mother 43    Other Mother 21        psoriasis    Heart Failure Mother 76    Heart Disease Mother 79        A fib     Heart Disease Brother 6        rheumatic fever as child     Heart Disease Maternal Uncle         unknown type of disease     Cancer Paternal Aunt         sarcoma hip     Cancer Maternal Grandmother     Heart Disease Sister 64        CAD, s/p triple bypass    Diabetes Paternal Grandmother        CareTeam (Including outside providers/suppliers regularly involved in providing care):   Patient Care Team:  Nalini Zaidi MD as PCP - Bertrand Phipps MD as PCP - [facility] Provider  Dayanna Lopez (Dermatology)    Wt Readings from Last 3 Encounters:   08/03/20 146 lb 3.2 oz (66.3 kg)   07/15/20 156 lb 3.2 oz (70.9 kg)   07/08/20 147 lb 3.2 oz (66.8 kg)     Vitals:    08/03/20 1421   BP: (!) 117/3   Site: Left Upper Arm   Position: Sitting   Cuff Size: Large Adult   Pulse: 66   Resp: 12   Temp: 97.3 °F (36.3 °C)   TempSrc: Oral   Weight: 146 lb 3.2 oz (66.3 kg)   Height: 5' 6\" (1.676 m)     Body mass index is 23.6 kg/m². Based upon direct observation of the patient, evaluation of cognition reveals recent and remote memory intact. Patient's complete Health Risk Assessment and screening values have been reviewed and are found in Flowsheets. The following problems were reviewed today and where indicated follow up appointments were made and/or referrals ordered. Positive Risk Factor Screenings with Interventions:     Health Habits/Nutrition:  Health Habits/Nutrition  Do you exercise for at least 20 minutes 2-3 times per week?: (!) No  Have you lost any weight without trying in the past 3 months?: No  Do you eat fewer than 2 meals per day?: No  Have you seen a dentist within the past year?: Yes  Body mass index is 23.6 kg/m².   Health Habits/Nutrition Interventions:  · Inadequate physical activity:  patient agrees to increase physical activity as follows: tread mill again    Safety:  Safety  Do you have working smoke detectors?: Yes  Have all throw rugs been removed or fastened?: (!) No  Do you have non-slip mats or surfaces in all bathtubs/showers?: (!) No  Do all of your stairways have a railing or banister?: Yes  Are your doorways, halls and stairs free of clutter?: Yes  Do you always fasten your seatbelt when you are in a car?: Yes  Safety Interventions:  · Home safety tips provided    Personalized Preventive Plan   Current Health Maintenance Status  Immunization History   Administered Date(s) Administered    FLUZONE 3 YEARS AND OVER 09/04/2013    Influenza 12/01/2015    Influenza Vaccine, unspecified formulation 10/01/2016    Influenza Virus Vaccine 10/13/2017, 10/16/2018, 10/08/2019    Influenza, High Dose (Fluzone 65 yrs and older) 10/01/2019    Pneumococcal Conjugate 13-valent ([id number]) 01/14/2015    Pneumococcal Polysaccharide ([id number]) 01/11/2017    Rabies 07/14/2018, 07/17/2018, 07/30/2018, 08/13/2018    Tdap (Boostrix, Adacel) 08/31/2012, 07/14/2018        Health Maintenance   Topic Date Due    Shingles Vaccine (1 of 2) 02/04/2002    Annual Wellness Visit (AWV)  05/29/2019    Flu vaccine (1) 09/01/2020    Potassium monitoring  11/01/2020    Creatinine monitoring  11/01/2020    Lipid screen  12/12/2020    TSH testing  12/12/2020    Breast cancer screen  03/27/2021    Pneumococcal 65+ years Vaccine (2 of 2 - PPSV23) 01/11/2022    Diabetes screen  10/22/2022    Colon cancer screen colonoscopy  07/14/2024    DTaP/Tdap/Td vaccine (3 - Td) 07/14/2028    DEXA (modify frequency per FRAX score)  Completed    Hepatitis C screen  Addressed    Hepatitis A vaccine  Aged Out    Hepatitis B vaccine  Aged Out    Hib vaccine  Aged Out    Meningococcal (ACWY) vaccine  Aged Out     Recommendations for [place] Due: see orders and patient instructions/AVS.  . Recommended screening schedule for the next 5-10 years is provided to the patient in written form: see Patient Instructions/AVS.    Daquan Peterson was seen today for medicare awv. Diagnoses and all orders for this visit:    Osteopenia, unspecified location  -     DEXA BONE DENSITY 2 SITES; Future  -     Vitamin D 25 Hydroxy; Future    Postmenopausal  -     DEXA BONE DENSITY 2 SITES;  Future          Danyel Marie MD

## 2020-08-03 NOTE — PATIENT INSTRUCTIONS
Personalized Preventive Plan for Toro Kaminski - 8/3/2020  Medicare offers a range of preventive health benefits. Some of the tests and screenings are paid in full while other may be subject to a deductible, co-insurance, and/or copay. Some of these benefits include a comprehensive review of your medical history including lifestyle, illnesses that may run in your family, and various assessments and screenings as appropriate. After reviewing your medical record and screening and assessments performed today your provider may have ordered immunizations, labs, imaging, and/or referrals for you. A list of these orders (if applicable) as well as your Preventive Care list are included within your After Visit Summary for your review. Other Preventive Recommendations:    · A preventive eye exam performed by an eye specialist is recommended every 1-2 years to screen for glaucoma; cataracts, macular degeneration, and other eye disorders. · A preventive dental visit is recommended every 6 months. · Try to get at least 150 minutes of exercise per week or 10,000 steps per day on a pedometer . · Order or download the FREE \"Exercise & Physical Activity: Your Everyday Guide\" from [employer]. Call 9-538.293.9675 or search [employer] online. · You need 6497-2287 mg of calcium and 8659-3000 IU of vitamin D per day. It is possible to meet your calcium requirement with diet alone, but a vitamin D supplement is usually necessary to meet this goal.  · When exposed to the sun, use a sunscreen that protects against both UVA and UVB radiation with an SPF of 30 or greater. Reapply every 2 to 3 hours or after sweating, drying off with a towel, or swimming. · Always wear a seat belt when traveling in a car. Always wear a helmet when riding a bicycle or motorcycle.

## 2020-08-13 ENCOUNTER — HOSPITAL ENCOUNTER (OUTPATIENT)
Dept: WOMENS IMAGING | Age: 70
Discharge: HOME | End: 2020-08-13
Payer: MEDICARE

## 2020-08-13 ENCOUNTER — NURSE ONLY (OUTPATIENT)
Dept: LAB | Age: 70
End: 2020-08-13

## 2020-08-13 LAB — VITAMIN D 25-HYDROXY: 45 NG/ML (ref 30–100)

## 2020-08-13 PROCEDURE — 77080 DXA BONE DENSITY AXIAL: CPT

## 2020-08-14 ENCOUNTER — TELEPHONE (OUTPATIENT)
Dept: FAMILY MEDICINE CLINIC | Age: 70
End: 2020-08-14

## 2020-08-14 NOTE — TELEPHONE ENCOUNTER
----- Message from Ayla Do MD sent at 8/13/2020  7:02 PM EDT -----  Bone density showed Osteoporosis.  Please refer her to the fragility clinic

## 2020-08-14 NOTE — TELEPHONE ENCOUNTER
Spoke with patient regarding results. Patient verbalized understanding and okay to proceed with referral. Their office will contact the patient directly to schedule.

## 2020-09-22 ENCOUNTER — OFFICE VISIT (OUTPATIENT)
Dept: RHEUMATOLOGY | Age: 70
End: 2020-09-22
Payer: MEDICARE

## 2020-09-22 VITALS
HEIGHT: 66 IN | BODY MASS INDEX: 23.69 KG/M2 | HEART RATE: 81 BPM | WEIGHT: 147.4 LBS | SYSTOLIC BLOOD PRESSURE: 132 MMHG | TEMPERATURE: 96.9 F | OXYGEN SATURATION: 98 % | DIASTOLIC BLOOD PRESSURE: 68 MMHG

## 2020-09-22 PROCEDURE — 3017F COLORECTAL CA SCREEN DOC REV: CPT | Performed by: NURSE PRACTITIONER

## 2020-09-22 PROCEDURE — 4040F PNEUMOC VAC/ADMIN/RCVD: CPT | Performed by: NURSE PRACTITIONER

## 2020-09-22 PROCEDURE — 1090F PRES/ABSN URINE INCON ASSESS: CPT | Performed by: NURSE PRACTITIONER

## 2020-09-22 PROCEDURE — G8427 DOCREV CUR MEDS BY ELIG CLIN: HCPCS | Performed by: NURSE PRACTITIONER

## 2020-09-22 PROCEDURE — 1123F ACP DISCUSS/DSCN MKR DOCD: CPT | Performed by: NURSE PRACTITIONER

## 2020-09-22 PROCEDURE — G8399 PT W/DXA RESULTS DOCUMENT: HCPCS | Performed by: NURSE PRACTITIONER

## 2020-09-22 PROCEDURE — 99213 OFFICE O/P EST LOW 20 MIN: CPT | Performed by: NURSE PRACTITIONER

## 2020-09-22 PROCEDURE — G8420 CALC BMI NORM PARAMETERS: HCPCS | Performed by: NURSE PRACTITIONER

## 2020-09-22 PROCEDURE — 1036F TOBACCO NON-USER: CPT | Performed by: NURSE PRACTITIONER

## 2020-09-22 ASSESSMENT — ENCOUNTER SYMPTOMS
SHORTNESS OF BREATH: 0
CONSTIPATION: 0
BACK PAIN: 1
DIARRHEA: 1
EYE ITCHING: 0
TROUBLE SWALLOWING: 0
COUGH: 0
EYE PAIN: 0
ABDOMINAL PAIN: 0
NAUSEA: 0

## 2020-09-22 NOTE — PROGRESS NOTES
\[facility]\""  Bone Fragility Adult Consult/Referral     Visit Date: 9/22/2020  MRN: [id number]  Cc:   Chief Complaint   Patient presents with    New Patient     refered per Dr. Cristina Solomon   osteoporosis       HPI:   Amadou Paz  is a(n)70 y.o. female with a hx of HTN, hypothyroidism, HLD, IBS, trigeminal neuralgia here today as a consultation from Dr. Cristina Solomon for the evaluation of Osteoporosis. She was diagnosed with osteoporosis when most recent bone density scan on 8/13/2020 revealed T score -2.50 at left femoral neck. Patient admits to history of fracture of her knee cap a few years ago. +fmhx of osteoporosis in her sister. No known family history of fractures. Has total hysterectomy at age 54, was on premarin until age of 66. She is . Has noticed shrinking in height of about 1.5 inches. Hx of social smoking for about 3 months, however then quit. Has history of second hand smoke exposure. Does not drink any alcohol. Drinks decaf coffee. Has intolerance to dairy. Taking 500 mg calcium and 2000 IU vitamin D3 daily. Does not exercise regularly, but tried to stay active.          + Pet, + throw rugs, + nightlights, - railing in bathroom, - falls or near fall, + muscle weakness (hands, wrists)      ROS:  Review of Systems   Constitutional: Positive for fatigue. Negative for fever and unexpected weight change. HENT: Negative for congestion and trouble swallowing. Eyes: Negative for pain and itching. Respiratory: Negative for cough and shortness of breath. Cardiovascular: Positive for palpitations. Negative for chest pain and leg swelling. Gastrointestinal: Positive for diarrhea. Negative for abdominal pain, constipation and nausea. Endocrine: Negative for cold intolerance and heat intolerance. Genitourinary: Negative for difficulty urinating, frequency and urgency. Musculoskeletal: Positive for arthralgias, back pain and joint swelling. Skin: Negative for rash. Neurological: Positive for weakness. Negative for dizziness, numbness and headaches. Psychiatric/Behavioral: The patient is not nervous/anxious.         PAST MEDICAL HISTORY  Past Medical History:   Diagnosis Date    Allergic rhinitis     Arthritis     left thumb    GERD (gastroesophageal reflux disease)     HTN (hypertension) 2013    Hyperlipemia 2004    Hypothyroid 2009    Levothyroxine    IBS (irritable bowel syndrome)     Knee cap dislocation     broke right knee    Multilevel degenerative disc disease 2009    Takes [employer] [place] off and on as needed    Osteoarthritis 1982    Osteopenia 2008    PUD (peptic ulcer disease) 2009    Recurrent low back pain 1992    disc disease       SOCIAL HISTORY  Social History     Socioeconomic History    Marital status:      Spouse name: None    Number of children: None    Years of education: None    Highest education level: None   Occupational History    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[employer], graduated in      [employer] strain: None    Food insecurity     Worry: None     Inability: None    Transportation needs     Medical: None     Non-medical: None   Tobacco Use    Smoking status: Former Smoker     Packs/day: 0.50     Years: 0.50     Pack years: 0.25     Last attempt to quit: 1973     Years since quittin.1    Smokeless tobacco: Never Used   Substance and Sexual Activity    Alcohol use: Not Currently     Alcohol/week: 0.0 standard drinks    Drug use: No    Sexual activity: Not Currently     Partners: Male   Lifestyle    Physical activity     Days per week: None     Minutes per session: None    Stress: None   Relationships    Social connections     Talks on phone: None     Gets together: None     Attends Sabianism service: None     Active member of club or organization: None     Attends meetings of clubs or organizations: None     Relationship status: None    Intimate partner violence     Fear of current or ex partner: None     Emotionally abused: None     Physically abused: None     Forced sexual activity: None   Other Topics Concern    None   Social History Narrative    None       FAMILY HISTORY  Family History   Problem Relation Age of Onset    High Blood Pressure Father     Other Father 76        abd anyuerism    Heart Disease Father         abdominal aortic aneurysm    Thyroid Disease Mother 43    Other Mother 21        psoriasis    Heart Failure Mother 76    Heart Disease Mother 79        A fib     Heart Disease Brother 6        rheumatic fever as child     Heart Disease Maternal Uncle         unknown type of disease     Cancer Paternal Aunt         sarcoma hip     Cancer Maternal Grandmother     Heart Disease Sister 64        CAD, s/p triple bypass    Diabetes Paternal Grandmother        SURGICAL HISTORY  Past Surgical History:   Procedure Laterality Date    BUNIONECTOMY  1991    rt    COLONOSCOPY  2005    Due 2014 Dr Griselda Mckeon    wisdom teeth extraction    HYSTERECTOMY  2001    KNEE ARTHROSCOPY  2004    lt meniscus repair     LAPAROSCOPY  1983    abd infection    SKIN CANCER EXCISION  07-17-13    Dr Myles Delgado  Allergies   Allergen Reactions    Amoxicillin Diarrhea    Darvon [Propoxyphene Hcl] Other (See Comments)     Feeling of extremity numbness.  Seasonal       cat, grass, dust, mold    Sudafed [Pseudoephedrine Hcl] Other (See Comments)     Restless feeling.        CURRENTMEDICATIONS  Current Outpatient Medications   Medication Sig Dispense Refill    ibuprofen (ADVIL;MOTRIN) 200 MG tablet Take 200 mg by mouth every 6 hours as needed for Pain      lisinopril (PRINIVIL;ZESTRIL) 10 MG tablet Take 1 tablet by mouth daily 90 tablet 1    levothyroxine (SYNTHROID) 50 MCG tablet TAKE 1 TABLET DAILY 90 tablet 1    fluticasone (FLONASE) 50 MCG/ACT nasal spray 2 sprays by Nasal route daily 1 Bottle 5    Omega-3 Fatty Neuro: Oriented to person, place, time. DTR's 2/4 bilat and equal  Psych: No anxiety or agitation. Skin: Warm and dry. No rash on exposed extremities. Lymph: No cervical LAD. No supraclavicular LAD.        Labs:  CBC  Lab Results   Component Value Date    WBC 8.7 11/01/2019    RBC 4.57 11/01/2019    HGB 13.8 11/01/2019    HCT 42.6 11/01/2019    MCV 93.2 11/01/2019    MCH 30.2 11/01/2019    MCHC 32.4 11/01/2019    RDW 13.3 01/11/2017     11/01/2019       CMP  Lab Results   Component Value Date    CALCIUM 9.7 11/01/2019    LABALBU 4.4 11/01/2019    PROT 8.1 11/01/2019     11/01/2019    K 3.8 11/01/2019    CO2 29 11/01/2019     11/01/2019    BUN 17 11/01/2019    CREATININE 0.8 11/01/2019    ALKPHOS 133 11/01/2019    ALT 14 11/01/2019    AST 19 11/01/2019       HgBA1c: No components found for: HGBA1C    Lab Results   Component Value Date    TSH 1.260 12/12/2019     Lab Results   Component Value Date    VITD25 45 08/13/2020       Lab Results   Component Value Date    SEDRATE 20 07/08/2020     Lab Results   Component Value Date    CRP 1.28 (H) 01/11/2017       Lab Results   Component Value Date    VITD25 45 08/13/2020    CALCIUM 9.7 11/01/2019     Lab Results   Component Value Date     11/01/2019    K 3.8 11/01/2019     11/01/2019    CO2 29 11/01/2019    BUN 17 11/01/2019    CREATININE 0.8 11/01/2019    GLUCOSE 104 11/01/2019    CALCIUM 9.7 11/01/2019    PROT 8.1 (H) 11/01/2019    LABALBU 4.4 11/01/2019    BILITOT 0.5 11/01/2019    ALKPHOS 133 (H) 11/01/2019    AST 19 11/01/2019    ALT 14 11/01/2019         RADIOLOGY:     DEXA 8/13/2020  Left hip: -2.50, 0.571  Right hip: -1.80, 0.645  Lumbar spine: -2.40, 0.782    DEXA 5/9/2017  Left hip: -2.10, 0.611  Right hip: -2.00, 0.630  Lumbar spine: -2.00, 0.826      Assessment and plan:  1. Age-related osteoporosis without current pathological fracture  - A 78year old  female diagnosed with osteoporosis w/ T score -2.5 on recent DEXA. +fmhx of osteoporosis in sister. Denies any fmhx of fracture. Patient denies any fragility fractures. Has not been on treatment in the past.   - Recent DEXA reviewed with patient. - discussed alendronate for treatment. The potential for acute phase reaction, worsening renal function, osteonecrosis of the jaw, atypical femoral fracture and hypocalcemia was discussed with the patient. Need labs first.   - Check CMP, CBC, phos, mag, sed rate, CRP,  for baseline and to screen for secondary osteoporosis. - recent vit D level and TSH normal.   - Encouraged calcium (1200 mg daily) and vitamin D3 (2000 IU daily) supplementation  - Encouraged regular weight bearing exercises. - Information given about PT bone fragility program.  - Education provided about fall risk prevention.    - Repeat DEXA 8/2022.     2. Other fatigue  - CBC Auto Differential    No follow-ups on file. Electronically signed by LEFTY Contreras CNP on 9/22/2020 at 12:03 PM      Thank you for allowing me to participate in the care of this patient. Please call if there are any questions.

## 2020-10-12 ENCOUNTER — OFFICE VISIT (OUTPATIENT)
Dept: FAMILY MEDICINE CLINIC | Age: 70
End: 2020-10-12
Payer: MEDICARE

## 2020-10-12 VITALS
TEMPERATURE: 97.8 F | BODY MASS INDEX: 23.14 KG/M2 | DIASTOLIC BLOOD PRESSURE: 84 MMHG | SYSTOLIC BLOOD PRESSURE: 140 MMHG | WEIGHT: 144 LBS | HEIGHT: 66 IN | HEART RATE: 79 BPM

## 2020-10-12 PROCEDURE — G8399 PT W/DXA RESULTS DOCUMENT: HCPCS | Performed by: FAMILY MEDICINE

## 2020-10-12 PROCEDURE — 99214 OFFICE O/P EST MOD 30 MIN: CPT | Performed by: FAMILY MEDICINE

## 2020-10-12 PROCEDURE — G8484 FLU IMMUNIZE NO ADMIN: HCPCS | Performed by: FAMILY MEDICINE

## 2020-10-12 PROCEDURE — G0008 ADMIN INFLUENZA VIRUS VAC: HCPCS | Performed by: FAMILY MEDICINE

## 2020-10-12 PROCEDURE — 90694 VACC AIIV4 NO PRSRV 0.5ML IM: CPT | Performed by: FAMILY MEDICINE

## 2020-10-12 PROCEDURE — 1036F TOBACCO NON-USER: CPT | Performed by: FAMILY MEDICINE

## 2020-10-12 PROCEDURE — G8420 CALC BMI NORM PARAMETERS: HCPCS | Performed by: FAMILY MEDICINE

## 2020-10-12 PROCEDURE — 1090F PRES/ABSN URINE INCON ASSESS: CPT | Performed by: FAMILY MEDICINE

## 2020-10-12 PROCEDURE — 4040F PNEUMOC VAC/ADMIN/RCVD: CPT | Performed by: FAMILY MEDICINE

## 2020-10-12 PROCEDURE — 3017F COLORECTAL CA SCREEN DOC REV: CPT | Performed by: FAMILY MEDICINE

## 2020-10-12 PROCEDURE — 1123F ACP DISCUSS/DSCN MKR DOCD: CPT | Performed by: FAMILY MEDICINE

## 2020-10-12 PROCEDURE — G8427 DOCREV CUR MEDS BY ELIG CLIN: HCPCS | Performed by: FAMILY MEDICINE

## 2020-10-12 RX ORDER — LEVOTHYROXINE SODIUM 0.05 MG/1
TABLET ORAL
Qty: 90 TABLET | Refills: 1 | Status: SHIPPED | OUTPATIENT
Start: 2020-10-12 | End: 2021-06-22 | Stop reason: SDUPTHER

## 2020-10-12 RX ORDER — LISINOPRIL 10 MG/1
10 TABLET ORAL DAILY
Qty: 90 TABLET | Refills: 1 | Status: SHIPPED | OUTPATIENT
Start: 2020-10-12 | End: 2021-06-22 | Stop reason: SDUPTHER

## 2020-10-12 NOTE — PROGRESS NOTES
right knee    Multilevel degenerative disc disease 2009    Takes [employer] off and on as needed    Osteoarthritis 1982    Osteopenia 2008    PUD (peptic ulcer disease) 2009    Recurrent low back pain 1992    disc disease           Allergies:  is allergic to amoxicillin; darvon [propoxyphene hcl]; seasonal; and sudafed [pseudoephedrine hcl]. Medications:   Current Outpatient Medications   Medication Sig Dispense Refill    lisinopril (PRINIVIL;ZESTRIL) 10 MG tablet Take 1 tablet by mouth daily 90 tablet 1    levothyroxine (SYNTHROID) 50 MCG tablet TAKE 1 TABLET DAILY 90 tablet 1    ibuprofen (ADVIL;MOTRIN) 200 MG tablet Take 200 mg by mouth every 6 hours as needed for Pain      fluticasone (FLONASE) 50 MCG/ACT nasal spray 2 sprays by Nasal route daily 1 Bottle 5    Omega-3 Fatty Acids (FISH OIL PO) Take by mouth      albuterol sulfate HFA (PROVENTIL HFA) 108 (90 Base) MCG/ACT inhaler Inhale 2 puffs into the lungs every 6 hours as needed for Wheezing 1 Inhaler 3    acetaminophen (TYLENOL) 325 MG tablet Take 650 mg by mouth every 6 hours as needed for Pain      Coenzyme Q10 (COQ-10 PO) Take 1 tablet by mouth daily       cyclobenzaprine (FLEXERIL) 5 MG tablet Take 1 tablet by mouth 3 times daily as needed (back pain) Rx  09 90 tablet 0    aspirin 81 MG tablet Take 81 mg by mouth daily      calcium carbonate (OSCAL) 500 MG TABS tablet Take 500 mg by mouth daily      Cholecalciferol (D3 VITAMIN PO) Take 1,000 Units by mouth daily       vitamin C (ASCORBIC ACID) 500 MG tablet Take 500 mg by mouth daily      Krill Oil 500 MG CAPS Take 1 tablet by mouth daily      Loperamide HCl (IMODIUM PO) Take  by mouth daily as needed.  Blood Pressure KIT by Does not apply route. 1 kit 0     No current facility-administered medications for this visit.         Review of systems:  Review of Systems - History obtained from chart review and the patient  General ROS: negative for - chills, fatigue or fever  ENT ROS: negative for - headaches, nasal congestion or nasal discharge  Allergy and Immunology ROS: negative for - seasonal allergies  Hematological and Lymphatic ROS: negative for - bruising  Endocrine ROS: negative for - malaise/lethargy, polydipsia/polyuria or temperature intolerance  Respiratory ROS: positive for - cough and  shortness of breath. No wheezing  Cardiovascular ROS: negative for - chest pain or edema  Gastrointestinal ROS: negative for - abdominal pain, constipation, diarrhea or nausea/vomiting  Musculoskeletal ROS: negative for - joint pain or muscle pain  Neurological ROS: negative for - dizziness  Dermatological ROS: negative for - rash    Physical Examination:  BP (!) 140/84 (Site: Left Upper Arm, Position: Sitting, Cuff Size: Medium Adult)   Pulse 79   Temp 97.8 °F (36.6 °C) (Temporal)   Ht 5' 6\" (1.676 m)   Wt 144 lb (65.3 kg)   BMI 23.24 kg/m²     General-  Alert and oriented x 3, NAD  HEENT: NC, AT, PERRLA, EOMI, anicteric sclerae  Ears: Normal tympanic membranes bilaterally  Nose: swollen, pale turbinates  Mouth: no lesions, moist mucosas  Neck - supple, no significant adenopathy  Chest - clear to auscultation, no wheezes, rales or rhonchi, symmetric air entry  Heart - normal rate, regular rhythm, normal S1, S2, no murmurs, rubs, clicks or gallops  Abdomen - soft, nontender, nondistended, no masses or organomegaly  Extremities -  no pedal edema, no clubbing or cyanosis    Impression:   Diagnosis Orders   1. Essential hypertension  Comprehensive Metabolic Panel    Urinalysis with Microscopic    CBC Auto Differential   2. Hypercholesterolemia     3. Acquired hypothyroidism  TSH without Reflex   4. Osteoporosis, unspecified osteoporosis type, unspecified pathological fracture presence         Plan:    ContinueLlisinopril 10 mg 1 tablet by mouth daily. Continue Synthroid 50 mcg 1 tablet by mouth daily.   Continue physical therapy as prescribed by neurology    Orders Placed This Encounter   Procedures    INFLUENZA, QUADV, ADJUVANTED, 72 YRS =, IM, PF, PREFILL SYR, 0.5ML (FLUAD)    Comprehensive Metabolic Panel     Standing Status:   Future     Standing Expiration Date:   10/12/2021    TSH without Reflex     Standing Status:   Future     Standing Expiration Date:   10/12/2021    Urinalysis with Microscopic     Standing Status:   Future     Standing Expiration Date:   10/12/2021     Order Specific Question:   SPECIFY(EX-CATH,MIDSTREAM,CYSTO,ETC)? Answer:   midstream    CBC Auto Differential     Standing Status:   Future     Standing Expiration Date:   10/12/2021     Orders Placed This Encounter   Medications    lisinopril (PRINIVIL;ZESTRIL) 10 MG tablet     Sig: Take 1 tablet by mouth daily     Dispense:  90 tablet     Refill:  1    levothyroxine (SYNTHROID) 50 MCG tablet     Sig: TAKE 1 TABLET DAILY     Dispense:  90 tablet     Refill:  1       Follow Up:  Return in about 6 months (around 4/12/2021).     Melinda Roberson MD

## 2020-10-13 ENCOUNTER — NURSE ONLY (OUTPATIENT)
Dept: LAB | Age: 70
End: 2020-10-13

## 2020-10-13 LAB
ALBUMIN SERPL-MCNC: 4.7 G/DL (ref 3.5–5.1)
ALP BLD-CCNC: 158 U/L (ref 38–126)
ALT SERPL-CCNC: 11 U/L (ref 11–66)
AMORPHOUS: ABNORMAL
ANION GAP SERPL CALCULATED.3IONS-SCNC: 10 MEQ/L (ref 8–16)
AST SERPL-CCNC: 16 U/L (ref 5–40)
BACTERIA: ABNORMAL
BASOPHILS # BLD: 1.2 %
BASOPHILS ABSOLUTE: 0.1 THOU/MM3 (ref 0–0.1)
BILIRUB SERPL-MCNC: 0.7 MG/DL (ref 0.3–1.2)
BILIRUBIN URINE: NEGATIVE
BLOOD, URINE: ABNORMAL
BUN BLDV-MCNC: 14 MG/DL (ref 7–22)
C-REACTIVE PROTEIN: 1.83 MG/DL (ref 0–1)
CALCIUM SERPL-MCNC: 9.7 MG/DL (ref 8.5–10.5)
CASTS: ABNORMAL /LPF
CASTS: ABNORMAL /LPF
CHARACTER, URINE: CLEAR
CHLORIDE BLD-SCNC: 99 MEQ/L (ref 98–111)
CO2: 28 MEQ/L (ref 23–33)
COLOR: YELLOW
CREAT SERPL-MCNC: 0.8 MG/DL (ref 0.4–1.2)
CRYSTALS: ABNORMAL
EOSINOPHIL # BLD: 0.5 %
EOSINOPHILS ABSOLUTE: 0 THOU/MM3 (ref 0–0.4)
EPITHELIAL CELLS, UA: ABNORMAL /HPF
ERYTHROCYTE [DISTWIDTH] IN BLOOD BY AUTOMATED COUNT: 12.9 % (ref 11.5–14.5)
ERYTHROCYTE [DISTWIDTH] IN BLOOD BY AUTOMATED COUNT: 44.4 FL (ref 35–45)
GFR SERPL CREATININE-BSD FRML MDRD: 71 ML/MIN/1.73M2
GLUCOSE BLD-MCNC: 101 MG/DL (ref 70–108)
GLUCOSE, URINE: NEGATIVE MG/DL
HCT VFR BLD CALC: 42.5 % (ref 37–47)
HEMOGLOBIN: 13.7 GM/DL (ref 12–16)
IMMATURE GRANS (ABS): 0.02 THOU/MM3 (ref 0–0.07)
IMMATURE GRANULOCYTES: 0.3 %
KETONES, URINE: NEGATIVE
LEUKOCYTE ESTERASE, URINE: ABNORMAL
LYMPHOCYTES # BLD: 16.4 %
LYMPHOCYTES ABSOLUTE: 1.1 THOU/MM3 (ref 1–4.8)
MAGNESIUM: 2 MG/DL (ref 1.6–2.4)
MCH RBC QN AUTO: 30.4 PG (ref 26–33)
MCHC RBC AUTO-ENTMCNC: 32.2 GM/DL (ref 32.2–35.5)
MCV RBC AUTO: 94.4 FL (ref 81–99)
MISCELLANEOUS LAB TEST RESULT: ABNORMAL
MISCELLANEOUS LAB TEST RESULT: ABNORMAL
MONOCYTES # BLD: 7.8 %
MONOCYTES ABSOLUTE: 0.5 THOU/MM3 (ref 0.4–1.3)
NITRITE, URINE: NEGATIVE
NUCLEATED RED BLOOD CELLS: 0 /100 WBC
PH UA: 6.5 (ref 5–9)
PHOSPHORUS: 3.6 MG/DL (ref 2.4–4.7)
PLATELET # BLD: 391 THOU/MM3 (ref 130–400)
PMV BLD AUTO: 9.6 FL (ref 9.4–12.4)
POTASSIUM SERPL-SCNC: 3.7 MEQ/L (ref 3.5–5.2)
PROTEIN UA: NEGATIVE MG/DL
RBC # BLD: 4.5 MILL/MM3 (ref 4.2–5.4)
RBC URINE: ABNORMAL /HPF
RENAL EPITHELIAL, UA: ABNORMAL
SEG NEUTROPHILS: 73.8 %
SEGMENTED NEUTROPHILS ABSOLUTE COUNT: 4.9 THOU/MM3 (ref 1.8–7.7)
SODIUM BLD-SCNC: 137 MEQ/L (ref 135–145)
SPECIFIC GRAVITY UA: 1.02 (ref 1–1.03)
TOTAL PROTEIN: 7.9 G/DL (ref 6.1–8)
TSH SERPL DL<=0.05 MIU/L-ACNC: 0.73 UIU/ML (ref 0.4–4.2)
UROBILINOGEN, URINE: 0.2 EU/DL (ref 0–1)
WBC # BLD: 6.6 THOU/MM3 (ref 4.8–10.8)
WBC UA: ABNORMAL /HPF
YEAST: ABNORMAL

## 2020-10-13 RX ORDER — ALENDRONATE SODIUM 70 MG/1
70 TABLET ORAL
Qty: 12 TABLET | Refills: 1 | Status: SHIPPED | OUTPATIENT
Start: 2020-10-13 | End: 2021-04-05 | Stop reason: ALTCHOICE

## 2020-10-22 ENCOUNTER — TELEPHONE (OUTPATIENT)
Dept: FAMILY MEDICINE CLINIC | Age: 70
End: 2020-10-22

## 2020-10-22 NOTE — TELEPHONE ENCOUNTER
----- Message from Yan Thomas sent at 10/22/2020  8:57 AM EDT -----    ----- Message -----  From: Grant Higgins MD  Sent: 10/22/2020   1:21 AM EDT  To: [facility]    The labs were within acceptable ranges. Alkaline phosphatase a little bit higher than last time but not to the point that requires any further work-up.

## 2020-11-03 PROBLEM — M19.90 OSTEOARTHRITIS: Status: RESOLVED | Noted: 2020-11-03 | Resolved: 2020-11-03

## 2021-03-22 ENCOUNTER — OFFICE VISIT (OUTPATIENT)
Dept: RHEUMATOLOGY | Age: 71
End: 2021-03-22
Payer: MEDICARE

## 2021-03-22 VITALS
HEIGHT: 66 IN | TEMPERATURE: 96.6 F | OXYGEN SATURATION: 98 % | HEART RATE: 66 BPM | WEIGHT: 149.6 LBS | DIASTOLIC BLOOD PRESSURE: 72 MMHG | BODY MASS INDEX: 24.04 KG/M2 | SYSTOLIC BLOOD PRESSURE: 128 MMHG

## 2021-03-22 DIAGNOSIS — M81.0 AGE-RELATED OSTEOPOROSIS WITHOUT CURRENT PATHOLOGICAL FRACTURE: Primary | ICD-10-CM

## 2021-03-22 PROCEDURE — G8484 FLU IMMUNIZE NO ADMIN: HCPCS | Performed by: NURSE PRACTITIONER

## 2021-03-22 PROCEDURE — G8420 CALC BMI NORM PARAMETERS: HCPCS | Performed by: NURSE PRACTITIONER

## 2021-03-22 PROCEDURE — 3017F COLORECTAL CA SCREEN DOC REV: CPT | Performed by: NURSE PRACTITIONER

## 2021-03-22 PROCEDURE — 1090F PRES/ABSN URINE INCON ASSESS: CPT | Performed by: NURSE PRACTITIONER

## 2021-03-22 PROCEDURE — 1036F TOBACCO NON-USER: CPT | Performed by: NURSE PRACTITIONER

## 2021-03-22 PROCEDURE — G8427 DOCREV CUR MEDS BY ELIG CLIN: HCPCS | Performed by: NURSE PRACTITIONER

## 2021-03-22 PROCEDURE — 99213 OFFICE O/P EST LOW 20 MIN: CPT | Performed by: NURSE PRACTITIONER

## 2021-03-22 PROCEDURE — G8399 PT W/DXA RESULTS DOCUMENT: HCPCS | Performed by: NURSE PRACTITIONER

## 2021-03-22 PROCEDURE — 4040F PNEUMOC VAC/ADMIN/RCVD: CPT | Performed by: NURSE PRACTITIONER

## 2021-03-22 PROCEDURE — 1123F ACP DISCUSS/DSCN MKR DOCD: CPT | Performed by: NURSE PRACTITIONER

## 2021-03-22 ASSESSMENT — ENCOUNTER SYMPTOMS
TROUBLE SWALLOWING: 0
CONSTIPATION: 0
COUGH: 0
EYE PAIN: 0
ABDOMINAL PAIN: 0
DIARRHEA: 0
BACK PAIN: 0
NAUSEA: 0
EYE ITCHING: 0
SHORTNESS OF BREATH: 0

## 2021-03-22 NOTE — PROGRESS NOTES
[facility]  Bone Fragility Follow up     Visit Date: 3/22/2021  MRN: [id number]  Cc:   Chief Complaint   Patient presents with    6 Month Follow-Up      osteoporosis         HPI:   Moses Lawrence  is a(n)71 y.o. female here today for follow up of Osteoporosis. Took alendronate for about 2 months, stopped due to tooth pain, itching, GI upset, right hip pain. Symptoms resolved after stopping. No falls or fractures. Still taking calcium and vitamin D supplement. ROS:  Review of Systems   Constitutional: Negative for fatigue, fever and unexpected weight change. HENT: Negative for congestion and trouble swallowing. Eyes: Negative for pain and itching. Respiratory: Negative for cough and shortness of breath. Cardiovascular: Negative for chest pain and leg swelling. Gastrointestinal: Negative for abdominal pain, constipation, diarrhea and nausea. Endocrine: Negative for cold intolerance and heat intolerance. Genitourinary: Negative for difficulty urinating, frequency and urgency. Musculoskeletal: Negative for arthralgias, back pain and joint swelling. Skin: Negative for rash. Neurological: Negative for dizziness, weakness, numbness and headaches. Psychiatric/Behavioral: The patient is not nervous/anxious.           PAST MEDICAL HISTORY  Past Medical History:   Diagnosis Date    Allergic rhinitis     Arthritis     left thumb    GERD (gastroesophageal reflux disease)     HTN (hypertension) January 2013    Hyperlipemia 2004    Hypothyroid 2009    Levothyroxine    IBS (irritable bowel syndrome)     Knee cap dislocation     broke right knee    Multilevel degenerative disc disease 2009    Takes [address] off and on as needed    Osteoarthritis 1982    Osteopenia 2008    PUD (peptic ulcer disease) 2009    Recurrent low back pain 1992    disc disease       SOCIAL HISTORY  Social History     Socioeconomic History    Marital status:      Spouse name: None    Number of children: None    Years of education: None    Highest education level: None   Occupational History    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[employer], graduated in 2015   Social Needs    Financial resource strain: None    Food insecurity     Worry: None     Inability: None    Transportation needs     Medical: None     Non-medical: None   Tobacco Use    Smoking status: Former Smoker     Packs/day: 0.50     Years: 0.50     Pack years: 0.25     Quit date: 1973     Years since quittin.6    Smokeless tobacco: Never Used   Substance and Sexual Activity    Alcohol use: Not Currently     Alcohol/week: 0.0 standard drinks    Drug use: No    Sexual activity: Not Currently     Partners: Male   Lifestyle    Physical activity     Days per week: None     Minutes per session: None    Stress: None   Relationships    Social connections     Talks on phone: None     Gets together: None     Attends Caodaism service: None     Active member of club or organization: None     Attends meetings of clubs or organizations: None     Relationship status: None    Intimate partner violence     Fear of current or ex partner: None     Emotionally abused: None     Physically abused: None     Forced sexual activity: None   Other Topics Concern    None   Social History Narrative    None       FAMILY HISTORY  Family History   Problem Relation Age of Onset    High Blood Pressure Father     Other Father 76        abd anyuerism    Heart Disease Father         abdominal aortic aneurysm    Thyroid Disease Mother 43    Other Mother 21        psoriasis    Heart Failure Mother 76    Heart Disease Mother 79        A fib     Heart Disease Brother 6        rheumatic fever as child     Heart Disease Maternal Uncle         unknown type of disease     Cancer Paternal Aunt         sarcoma hip     Cancer Maternal Grandmother     Heart Disease Sister 64        CAD, s/p triple bypass    Diabetes Paternal Grandmother SURGICAL HISTORY  Past Surgical History:   Procedure Laterality Date    BUNIONECTOMY      rt    COLONOSCOPY      Due  Dr Dillon Lacy    wisdom teeth extraction    HYSTERECTOMY      KNEE ARTHROSCOPY      lt meniscus repair     LAPAROSCOPY  1983    abd infection    SKIN CANCER EXCISION  13    Dr Mary Rodriguez  Allergies   Allergen Reactions    Amoxicillin Diarrhea    Darvon [Propoxyphene Hcl] Other (See Comments)     Feeling of extremity numbness.  Seasonal       cat, grass, dust, mold    Sudafed [Pseudoephedrine Hcl] Other (See Comments)     Restless feeling.        CURRENTMEDICATIONS  Current Outpatient Medications   Medication Sig Dispense Refill    alendronate (FOSAMAX) 70 MG tablet Take 1 tablet by mouth every 7 days 12 tablet 1    lisinopril (PRINIVIL;ZESTRIL) 10 MG tablet Take 1 tablet by mouth daily 90 tablet 1    levothyroxine (SYNTHROID) 50 MCG tablet TAKE 1 TABLET DAILY 90 tablet 1    ibuprofen (ADVIL;MOTRIN) 200 MG tablet Take 200 mg by mouth every 6 hours as needed for Pain      Omega-3 Fatty Acids (FISH OIL PO) Take by mouth      albuterol sulfate HFA (PROVENTIL HFA) 108 (90 Base) MCG/ACT inhaler Inhale 2 puffs into the lungs every 6 hours as needed for Wheezing 1 Inhaler 3    acetaminophen (TYLENOL) 325 MG tablet Take 650 mg by mouth every 6 hours as needed for Pain      Coenzyme Q10 (COQ-10 PO) Take 1 tablet by mouth daily       cyclobenzaprine (FLEXERIL) 5 MG tablet Take 1 tablet by mouth 3 times daily as needed (back pain) Rx  09 90 tablet 0    aspirin 81 MG tablet Take 81 mg by mouth daily      calcium carbonate (OSCAL) 500 MG TABS tablet Take 500 mg by mouth daily      Cholecalciferol (D3 VITAMIN PO) Take 1,000 Units by mouth daily       vitamin C (ASCORBIC ACID) 500 MG tablet Take 500 mg by mouth daily      Krill Oil 500 MG CAPS Take 1 tablet by mouth daily      Loperamide HCl (IMODIUM PO) Take  by mouth daily as needed.  Blood Pressure KIT by Does not apply route. 1 kit 0    fluticasone (FLONASE) 50 MCG/ACT nasal spray 2 sprays by Nasal route daily 1 Bottle 5     No current facility-administered medications for this visit. Objective:  /72 (Site: Left Upper Arm, Position: Sitting, Cuff Size: Medium Adult)   Pulse 66   Temp 96.6 °F (35.9 °C)   Ht 5' 5.98\" (1.676 m)   Wt 149 lb 9.6 oz (67.9 kg)   SpO2 98%   BMI 24.16 kg/m²     General: No distress. Alert. Eyes: PERRL. No conjunctival injection. ENT: No discharge. Pharynx clear. Neck: Trachea midline. Normal thyroid. Resp: No accessory muscle use. Lung sounds clear. CV: Regular rate. Regularrhythm. No mumur or rub. No edema. GI: Non-tender. Non-distended. M/S:   Upper extremities:  Muscle strength 5/5, FROM, No Synovitis     Lower Extremities:   Muscle strength 5/5, FROM, No Synovitis     Neuro: Oriented to person, place, time. DTR's 2/4 bilat and equal  Psych:  No anxiety or agitation. Skin: Warm and dry. No rash on exposed extremities. Lymph: No cervical LAD. No supraclavicular LAD.        Labs:  CBC  Lab Results   Component Value Date    WBC 6.6 10/13/2020    RBC 4.50 10/13/2020    HGB 13.7 10/13/2020    HCT 42.5 10/13/2020    MCV 94.4 10/13/2020    MCH 30.4 10/13/2020    MCHC 32.2 10/13/2020    RDW 13.3 01/11/2017     10/13/2020       CMP  Lab Results   Component Value Date    CALCIUM 9.7 10/13/2020    LABALBU 4.7 10/13/2020    PROT 7.9 10/13/2020     10/13/2020    K 3.7 10/13/2020    CO2 28 10/13/2020    CL 99 10/13/2020    BUN 14 10/13/2020    CREATININE 0.8 10/13/2020    ALKPHOS 158 10/13/2020    ALT 11 10/13/2020    AST 16 10/13/2020       HgBA1c: No components found for: HGBA1C    Lab Results   Component Value Date    TSH 0.726 10/13/2020     Lab Results   Component Value Date    VITD25 45 08/13/2020       Lab Results   Component Value Date    SEDRATE 20 07/08/2020 Lab Results   Component Value Date    CRP 1.83 (H) 10/13/2020       Lab Results   Component Value Date    VITD25 45 08/13/2020    CALCIUM 9.7 10/13/2020    MG 2.0 10/13/2020     Lab Results   Component Value Date     10/13/2020    K 3.7 10/13/2020    CL 99 10/13/2020    CO2 28 10/13/2020    BUN 14 10/13/2020    CREATININE 0.8 10/13/2020    GLUCOSE 101 10/13/2020    CALCIUM 9.7 10/13/2020    PROT 7.9 10/13/2020    LABALBU 4.7 10/13/2020    BILITOT 0.7 10/13/2020    ALKPHOS 158 (H) 10/13/2020    AST 16 10/13/2020    ALT 11 10/13/2020         RADIOLOGY:     DEXA 8/13/2020  Left hip: -2.50, 0.571  Right hip: -1.80, 0.645  Lumbar spine: -2.40, 0.782     DEXA 5/9/2017  Left hip: -2.10, 0.611  Right hip: -2.00, 0.630  Lumbar spine: -2.00, 0.826    Assessment and plan:  1. Age-related osteoporosis without current pathological fracture  - A 78year old  female diagnosed with osteoporosis w/ T score -2.5 on recent DEXA. +fmhx of osteoporosis in sister. Denies any fmhx of fracture. Patient denies any fragility fractures. Has not been on treatment in the past.   - prior tx: alendronate (side effects)  - discussed reclast vs prolia. Pt declines tx options at this time  - continue calcium (1200 mg daily) and vitamin D3 (2000 IU daily) supplementation  - Referral to PT for bone fragility program  - Repeat DEXA 8/2022. No follow-ups on file. Electronically signed by LEFTY Rudolph CNP on 3/22/2021 at 10:44 AM      Thank you for allowing me to participate in the care of this patient. Please call if there are any questions.

## 2021-03-30 ENCOUNTER — HOSPITAL ENCOUNTER (OUTPATIENT)
Dept: WOMENS IMAGING | Age: 71
Discharge: HOME | End: 2021-03-30
Payer: MEDICARE

## 2021-03-30 DIAGNOSIS — Z12.31 VISIT FOR SCREENING MAMMOGRAM: ICD-10-CM

## 2021-03-30 PROCEDURE — 77063 BREAST TOMOSYNTHESIS BI: CPT

## 2021-04-02 ENCOUNTER — HOSPITAL ENCOUNTER (OUTPATIENT)
Dept: PHYSICAL THERAPY | Age: 71
Setting detail: THERAPIES SERIES
Discharge: HOME | End: 2021-04-02
Payer: MEDICARE

## 2021-04-02 PROCEDURE — 97162 PT EVAL MOD COMPLEX 30 MIN: CPT

## 2021-04-02 NOTE — PROGRESS NOTES
precertification. Visit # 1, 1/10 for progress note   Visits Allowed: Per medical necessity   Recertification Date: 0/79/5408   Physician Follow-Up: Follow up in March 2022. Physician Orders: Bone fragility: PT    History of Present Illness: Patient has been on bone building medication: Fosomax and had right hip pain, skin blisters/itching, teeth sensitivity  took medication for 18 weeks. Noted decreased height by 1 1/2 inches,      SUBJECTIVE: Patient concerned about her right hip pain that she has had for about 1 year since following up with Dr. Kaiser Speak about. She also would like to improve her bone mass and find out proper exercises to complete for her osteoporosis. Notes unsteadiness from time to time but has not been bad recently for past 1 1/2 years. Notes feels less stable when negotiating stair steps and feels like she could lose her balance. If she picks up sticks in the yard with bending forward or sitting [facility] style on floor she can increase her right hip pain. Does not climb ladders due to fear of falling and would not be wise for her to do. Notes weakness with hand  to open jars. She limits her lifting of > 25 # due to more difficulty lifting > wt. Does have a fitness area at home with Verivue Bike, TM, weight equipment, [place] Flex stability balls. Social/Functional History and Current Status:  Medications and Allergies have been reviewed and are listed on Medical History Questionnaire. Bon Ortiz lives with spouse in a single story home with stairs and no handrail to enter.  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 Comes and goes with noticeable with crossing legs, walking, rolling in bed to left and lifting leg. Palpation Not assessed. Observation Note good upright posture/position awareness in sitting and standing. Note slight decrease wt shift through RLE with some complaints of right hip discomfort with noted sit<>stand, inability to complete a partial or full squat. Decreased balance with single heel raises. Posture Note fairly good posture without thoracic kyphosis, Note slight decreased wt shift through RLE in stance and negotiating stair steps. Range of Motion UE shoulder ROM WNLS, right hip flexion SKTC 110 degrees, SKTC left 122 degrees, hip ER left 45 degrees, right 30 degrees, IR left 25 degrees, right 0 degrees. SLR right/left 65 degrees -70 degrees. Strength Functional Strength: Note decreased ease with sit<>stand chair stand with patient using hands on thighs to sit to stand and stand to sit. Difficulty with sit<>stand without use of UEs. Bilateral heelraises x10 WNLS, single heelraises only able to complete 1 reps with decreased balance noted. Sensation WNLS UE/LEs   Bed Mobility INdependent    Transfers Modified independent with hands on thighs to assist self to standing and sitting. Ambulation Patient ambulates independently. Note good walking pattern with exception of slightly decreased stance time through RLE. Stairs Negotiates 8 steps with single handrail with mild hip discomfort in descent of stair steps, decreased stance time RLE. Balance Tandem stance r/l 30 sec, l/r 23 seconds, SLS 13 seconds RLE, 17 seconds LLE. Narrow stance eyes open steady, eyes closed mild sway to right and left.     Special Tests Chair stand test 8x in 30 seconds (5x in 15 seconds) decreased wt shift through RLE, decreased ease and increased time to complete. TREATMENT   Precautions: Osteoporosis no endrange rotation, lateral flexion of spine, No loaded spine flexion, Concentrate on strengthening spinal extensors, position awareness, ADLS, balance and fall prevention. Osteoporosis /bone fragility program.    Pain: 2/10 with crossing legs and lifting RLE. X in shaded column indicates activity completed today   Modalities Parameters/  Location  Notes                     Manual Therapy Time/Technique  Notes                     Exercise/Intervention   Notes   ADLs: proper bed mobility with supine <>sit using logroll technique. x    General information regarding the bone fragility program in PT.    x    Review of T scores on Dexascan   x                                                              Specific Interventions Next Treatment: Osteoporosis/Bone fragility program. Patient has right hip pain limiting functional mobility with squats, chair stand, decreased balance with SLS, single heelraises. Decreased right hip strength, right hip pain. Concentrate on spine extensor strengthening. Avoiding flexion/loaded flexion of spine. Balance, LE strength, core strength. Bone fragility program with ADLS, fall prevention and exercise principles. Activity/Treatment Tolerance:right hip pain. decreased right hip ROM actively  [x]  Patient tolerated treatment well  []  Patient limited by fatigue  []  Patient limited by pain   []  Patient limited by medical complications  []  Other:     Assessment: Patient referred to PT bone fragility program. Patient has osteoporosis and noted decreased balance with SLS, single heelraises, right hip pain with limited hip ROM, strength LEs, and core are decreased. Also requires further education about osteoporosis with fall prevention, ADLS, exercise principles.  Will develop an exercise plan with patient incorporating balance, strength for core, LEs, spinal extensors to improve functional mobility with transfers, partial squat. Proper body mechanics and ADLS, exercise management for osteoporosis. It was also advised that she discuss and have Xrays of the right hip due to ongoing pain, decreased AROM, difficulty with functional mobility with sit<>stand transfers, partial squat, crossing leg, lifting RLE. Will follow 1-2x per week to address these deficits. Body Structures/Functions/Activity Limitations: impaired activity tolerance, impaired balance, impaired ROM, impaired safety awareness, impaired strength, pain and abnormal gait  Prognosis: good    GOALS:  Patient Goal: To become more knowledgeable of Osteoporosis management. Have an exercise program that will help build bone mass. Be able to return to a good ex program addressing balance, right hip and strength. Use the fitness equipment that she has at her home properly due to osteoporosis     Short Term Goals:  Time Frame: 4 weeks  1. Patient to complete chair stand test from 8x to 10x using LEs/core instead of supporting hands on thighs to sit<>stand. 2. Patient to demonstrate increased balance: SLS from 13 -17 seconds to 20 seconds, Tandem stance from 23-30 seconds to 45 seconds with improved stance time through RLE and balance. 3. Patient to demonstrate increased strength at back extensors, core and LEs at hip musculature with hip ability to improve functional mobility with ease with transfers with chair stand test without UE support on armrests or thighs, ability to complete a functional partial squat and to full squat if right hip pain does not interfere with these functional activities. 4. Patient to demonstrate increased ROM at right hip: right hip flexion (SKTC) 110 degrees to 120 degrees actively, SLR from 65 degrees to 80 degrees. With increased ease with mobility with crossing legs, placing pants on, partial squat, lifting RLE. Long Term Goals:  Time Frame: 8 weeks  1.  Patient to demonstrate knowledge of Osteoporosis with applications with ADLS, fall prevention, proper exercise principles and independent with an HEP to follow following proper principles with strengthening core, hips, back extensors, ROM, balance. 2. Chair stand test to 12x in 30 seconds with improved functional strength, balance, and mobility. Patient Education:   [x]  HEP/Education Completed: Plan of Care, Goals, ADLS with proper bed mobility, bone fragility informational material on management through proper ADLS, fall prevention, exercise principles.  [employer] Access Code:  []  No new Education completed  []  Reviewed Prior HEP      [x]  Patient verbalized and/or demonstrated understanding of education provided. []  Patient unable to verbalize and/or demonstrate understanding of education provided. Will continue education. [x]  Barriers to learning: none    PLAN:  Treatment Recommendations: Strengthening, Range of Motion, Balance Training, Functional Mobility Training, Transfer Training, Neuromuscular Re-education, Pain Management, Home Exercise Program, Patient Education and Safety Education and Training    [x]  Plan of care initiated. Plan to see patient 1-2 times per week for 8 weeks to address the treatment planned outlined above.   []  Continue with current plan of care  []  Modify plan of care as follows:    []  Hold pending physician visit  []  Discharge    Time In 1605   Time Out 1712   Timed Code Minutes: 0 min   Total Treatment Time: 67 min       Electronically Signed by: Adalgisa Escudero

## 2021-04-05 ENCOUNTER — OFFICE VISIT (OUTPATIENT)
Dept: FAMILY MEDICINE CLINIC | Age: 71
End: 2021-04-05
Payer: MEDICARE

## 2021-04-05 VITALS
HEIGHT: 66 IN | BODY MASS INDEX: 23.63 KG/M2 | HEART RATE: 71 BPM | SYSTOLIC BLOOD PRESSURE: 127 MMHG | DIASTOLIC BLOOD PRESSURE: 80 MMHG | WEIGHT: 147 LBS | TEMPERATURE: 97.4 F

## 2021-04-05 DIAGNOSIS — E03.9 ACQUIRED HYPOTHYROIDISM: ICD-10-CM

## 2021-04-05 DIAGNOSIS — I10 ESSENTIAL HYPERTENSION: Primary | ICD-10-CM

## 2021-04-05 DIAGNOSIS — M81.0 OSTEOPOROSIS, UNSPECIFIED OSTEOPOROSIS TYPE, UNSPECIFIED PATHOLOGICAL FRACTURE PRESENCE: ICD-10-CM

## 2021-04-05 DIAGNOSIS — I67.9 SMALL VESSEL DISEASE, CEREBROVASCULAR: ICD-10-CM

## 2021-04-05 DIAGNOSIS — Z86.69 HISTORY OF TRIGEMINAL NEURALGIA: ICD-10-CM

## 2021-04-05 DIAGNOSIS — M50.30 DISC DISEASE, DEGENERATIVE, CERVICAL: ICD-10-CM

## 2021-04-05 PROCEDURE — G8399 PT W/DXA RESULTS DOCUMENT: HCPCS | Performed by: FAMILY MEDICINE

## 2021-04-05 PROCEDURE — G8427 DOCREV CUR MEDS BY ELIG CLIN: HCPCS | Performed by: FAMILY MEDICINE

## 2021-04-05 PROCEDURE — 4040F PNEUMOC VAC/ADMIN/RCVD: CPT | Performed by: FAMILY MEDICINE

## 2021-04-05 PROCEDURE — 1036F TOBACCO NON-USER: CPT | Performed by: FAMILY MEDICINE

## 2021-04-05 PROCEDURE — 3017F COLORECTAL CA SCREEN DOC REV: CPT | Performed by: FAMILY MEDICINE

## 2021-04-05 PROCEDURE — 1090F PRES/ABSN URINE INCON ASSESS: CPT | Performed by: FAMILY MEDICINE

## 2021-04-05 PROCEDURE — G8420 CALC BMI NORM PARAMETERS: HCPCS | Performed by: FAMILY MEDICINE

## 2021-04-05 PROCEDURE — 99214 OFFICE O/P EST MOD 30 MIN: CPT | Performed by: FAMILY MEDICINE

## 2021-04-05 PROCEDURE — 1123F ACP DISCUSS/DSCN MKR DOCD: CPT | Performed by: FAMILY MEDICINE

## 2021-04-05 ASSESSMENT — PATIENT HEALTH QUESTIONNAIRE - PHQ9
2. FEELING DOWN, DEPRESSED OR HOPELESS: 0
SUM OF ALL RESPONSES TO PHQ9 QUESTIONS 1 & 2: 0
SUM OF ALL RESPONSES TO PHQ QUESTIONS 1-9: 0

## 2021-04-05 NOTE — PROGRESS NOTES
[address] [place]  [address] [place], [address]  Ph:   [phone]  Fax: [phone]    Provider:  Dr. Megan Arteaga    Patient:  Raj Antoine  YOB: 1950      Visit Date:  4/5/2021     Reason For Visit:   Chief Complaint   Patient presents with    Follow-up    Hypertension    Hyperlipidemia    Hypothyroidism    Fatigue        Munira Escalante is a 73 y.o. 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 HTN: She is taking lisinopril 10 mg 1 tablet mouth daily and denies any side effect from medication. Her blood pressure readings at home has been in the 110s over 70s. She is following low-sodium diet. She is not exercising at the present time. She stopped in the last 3 or 4 days. For the last 2 weeks she was exercising 20 to 40 minutes a day but she had pain in her hips, right worse than left. She was diagnosed with osteoporosis August 13, 2020 and she was referred to the fragility clinic. Her risk factors are age, white female, family history of osteoporosis in her sister. She had total hysterectomy at age 54 and took Premarin until age 66. She is taking calcium 500 mg 1 tablet mouth twice a day and vitamin D 2000 international units daily. Hypothyroidism: She is taking Synthroid 50 mcg 1 tablet by mouth daily on empty stomach and by itself. TSH was checked October 13, 2020 and it was 0.726. Chronic microvascular ischemic angiopathy: Seen in the MRI done November 1, 2019. It showed no acute intracranial abnormality but senescent changes with interval increase in the amount of scattered, punctate foci of hyperintense T2 signal within the deep cerebral white matter. She have not been able to tolerate statins. She has been tried on Crestor 5 mg every day and Lipitor but she could not tolerate either one.      Has history of trigeminal neuralgia and sees Dr. Brooke Mckinney for it.  Last time she had exacerbation was in July 2020 for which she took the third pill for a month. She did not follow up with Dr. Chris Villegas as she got better. Her last episode was January 2020 for which she took  left over Tegretol x 1 month and the symptoms resolved. Multilevel degenerative changes throughout the cervical spine. In MRI ordered by Dr. Chris Villegas.  She was referred to physical therapy. When she have acute exacerbation she restart the exercises. .      Significant Past Medical History:    Past Medical History:   Diagnosis Date    Allergic rhinitis     Arthritis     left thumb    GERD (gastroesophageal reflux disease)     HTN (hypertension) January 2013    Hyperlipemia 2004    Hypothyroid 2009    Levothyroxine    IBS (irritable bowel syndrome)     Knee cap dislocation     broke right knee    Multilevel degenerative disc disease 2009    Takes [facility] off and on as needed    Osteoarthritis 1982    Osteopenia 2008    PUD (peptic ulcer disease) 2009    Recurrent low back pain 1992    disc disease           Allergies:  is allergic to amoxicillin; darvon [propoxyphene hcl]; seasonal; and sudafed [pseudoephedrine hcl].     Medications:   Current Outpatient Medications   Medication Sig Dispense Refill    lisinopril (PRINIVIL;ZESTRIL) 10 MG tablet Take 1 tablet by mouth daily 90 tablet 1    levothyroxine (SYNTHROID) 50 MCG tablet TAKE 1 TABLET DAILY 90 tablet 1    ibuprofen (ADVIL;MOTRIN) 200 MG tablet Take 200 mg by mouth every 6 hours as needed for Pain      fluticasone (FLONASE) 50 MCG/ACT nasal spray 2 sprays by Nasal route daily 1 Bottle 5    Omega-3 Fatty Acids (FISH OIL PO) Take by mouth      albuterol sulfate HFA (PROVENTIL HFA) 108 (90 Base) MCG/ACT inhaler Inhale 2 puffs into the lungs every 6 hours as needed for Wheezing 1 Inhaler 3    acetaminophen (TYLENOL) 325 MG tablet Take 650 mg by mouth every 6 hours as needed for Pain      Coenzyme Q10 (COQ-10 PO) Take 1 tablet by mouth daily       cyclobenzaprine (FLEXERIL) 5 MG tablet Take 1 tablet by mouth 3 times daily as needed (back pain) Rx  09 90 tablet 0    aspirin 81 MG tablet Take 81 mg by mouth daily      calcium carbonate (OSCAL) 500 MG TABS tablet Take 500 mg by mouth daily      Cholecalciferol (D3 VITAMIN PO) Take 1,000 Units by mouth daily       vitamin C (ASCORBIC ACID) 500 MG tablet Take 500 mg by mouth daily      Krill Oil 500 MG CAPS Take 1 tablet by mouth daily      Loperamide HCl (IMODIUM PO) Take  by mouth daily as needed. No current facility-administered medications for this visit. Review of systems:  Review of Systems - History obtained from chart review and the patient  General ROS: negative for - chills, fatigue or fever  ENT ROS: negative for - headaches, nasal congestion or nasal discharge  Allergy and Immunology ROS: negative for - seasonal allergies  Hematological and Lymphatic ROS: negative for - bruising  Endocrine ROS: negative for - malaise/lethargy, polydipsia/polyuria or temperature intolerance  Respiratory ROS: positive for - cough and  shortness of breath.   No wheezing  Cardiovascular ROS: negative for - chest pain or edema  Gastrointestinal ROS: negative for - abdominal pain, constipation, diarrhea or nausea/vomiting  Musculoskeletal ROS: negative for - joint pain or muscle pain  Neurological ROS: negative for - dizziness  Dermatological ROS: negative for - rash    Physical Examination:  /80 (Site: Left Upper Arm, Position: Sitting, Cuff Size: Medium Adult)   Pulse 71   Temp 97.4 °F (36.3 °C) (Temporal)   Ht 5' 6\" (1.676 m)   Wt 147 lb (66.7 kg)   BMI 23.73 kg/m²     General-  Alert and oriented x 3, NAD  HEENT: NC, AT, PERRLA, EOMI, anicteric sclerae  Ears: Normal tympanic membranes bilaterally  Nose: swollen, pale turbinates  Mouth: no lesions, moist mucosas  Neck - supple, no significant adenopathy  Chest - clear to auscultation, no wheezes, rales or rhonchi, symmetric air entry  Heart - normal rate, regular rhythm, normal S1, S2, no murmurs, rubs, clicks or gallops  Abdomen - soft, nontender, nondistended, no masses or organomegaly  Extremities -  no pedal edema, no clubbing or cyanosis    Impression:   Diagnosis Orders   1. Essential hypertension     2. Osteoporosis, unspecified osteoporosis type, unspecified pathological fracture presence     3. Small vessel disease, cerebrovascular     4. Acquired hypothyroidism     5. History of trigeminal neuralgia     6. Disc disease, degenerative, cervical         Plan:    Continue Lisinopril 10 mg 1 tablet by mouth daily. Continue Synthroid 50 mcg 1 tablet by mouth daily. Continue physical therapy as prescribed for Osteoporosis    No orders of the defined types were placed in this encounter. No orders of the defined types were placed in this encounter. Follow Up:  Return in about 6 months (around 10/5/2021).     Jacqueline Noyola MD

## 2021-04-07 ENCOUNTER — HOSPITAL ENCOUNTER (OUTPATIENT)
Dept: GENERAL RADIOLOGY | Age: 71
Discharge: HOME | End: 2021-04-07
Payer: MEDICARE

## 2021-04-07 ENCOUNTER — HOSPITAL ENCOUNTER (OUTPATIENT)
Age: 71
Discharge: HOME | End: 2021-04-07
Payer: MEDICARE

## 2021-04-07 DIAGNOSIS — M25.551 RIGHT HIP PAIN: ICD-10-CM

## 2021-04-07 PROCEDURE — 73502 X-RAY EXAM HIP UNI 2-3 VIEWS: CPT

## 2021-04-12 ENCOUNTER — HOSPITAL ENCOUNTER (OUTPATIENT)
Dept: PHYSICAL THERAPY | Age: 71
Setting detail: THERAPIES SERIES
Discharge: HOME | End: 2021-04-12
Payer: MEDICARE

## 2021-04-12 PROCEDURE — 97110 THERAPEUTIC EXERCISES: CPT

## 2021-04-12 NOTE — PROGRESS NOTES
[address] [facility]  PHYSICAL THERAPY  [] EVALUATION  [x] DAILY NOTE (LAND) [] DAILY NOTE (AQUATIC ) [] PROGRESS NOTE [] DISCHARGE NOTE    [x] [facility] [place]   [] [place] [address]    [] [facility]   [] [facility]    Date: 2021  Patient Name:  Ch[place] March  : 1950  MRN: [id number]  CSN: [id number]    Referring Practitioner Kathryn aTm, *   Diagnosis Age-related osteoporosis without current pathological fracture [M81.0]    Treatment Diagnosis Decreased balance with single heel raise and single leg stance, functional strength sit<>stand,    Date of Evaluation 21    Additional Pertinent History HTN, deg. Disc disease, OA-hands, bilateral knees, removal of Baker's cyst right knee, history of fractured patella right knee 7-8 years ago, osteoporosis. Hypothyroid, trigeminal nerve pain. Cervical/neck issues, history of herniated discs at lumbar. Functional Outcome Measure Used Chair stand test    Functional Outcome Score 8x in 30 seconds. WFLs if  <than 8x equals risk for mobility, disability and fraility.   (21)       Insurance: Primary: Payor: [employer] /  /  / ,   Secondary: [facility]   Authorization Information: Unlimited visits based on medical necessity. Aquatic yes, modalities yes, iontophoresis, HP/CP are not covered. No precertification. Visit # 1, 1/10 for progress note   Visits Allowed: Per medical necessity   Recertification Date:    Physician Follow-Up: Follow up in 2022. Physician Orders: Bone fragility: PT    History of Present Illness: Patient has been on bone building medication: Fosomax and had right hip pain, skin blisters/itching, teeth sensitivity  took medication for 18 weeks. Noted decreased height by 1 1/2 inches,      SUBJECTIVE: Patient reported of hip soreness and knee soreness following session from testing noted overnight but not following the activity that day.  She did have Xray of right hip and it revealed a bone spur. TREATMENT   Precautions: Osteoporosis no endrange rotation, lateral flexion of spine, No loaded spine flexion, Concentrate on strengthening spinal extensors, position awareness, ADLS, balance and fall prevention. Osteoporosis /bone fragility program.    Pain: 3/10 right hip with walking/wb through RLE,      X in shaded column indicates activity completed today   Modalities Parameters/  Location  Notes                     Manual Therapy Time/Technique  Notes                     Exercise/Intervention   Notes   ADLs: proper bed mobility with supine <>sit using logroll technique. General information regarding the bone fragility program in PT. Review of T scores on Dexascan              Neck retraction  5x  x    Thoracic extension with ball behind mid thoracic region 5x  x    Hands clasped behind head horizontal abduction with elbows opening and closing 5x  x    Retro shoulder rolls 10x  x    pec stretch in corner 3x Hold 10  x           Standing back against wall:       Horizontal abduction/adduction palms up  10x  x    Standing ER bilaterally  10x  x    Shoulder extension with neck rotation  3x  x                                                       Specific Interventions Next Treatment: Osteoporosis/Bone fragility program. Patient has right hip pain limiting functional mobility with squats, chair stand, decreased balance with SLS, single heelraises. Decreased right hip strength, right hip pain. Concentrate on spine extensor strengthening. Avoiding flexion/loaded flexion of spine. Balance, LE strength, core strength. Bone fragility program with ADLS, fall prevention and exercise principles. Activity/Treatment Tolerance:right hip pain. decreased right hip ROM actively  [x]  Patient tolerated treatment well  []  Patient limited by fatigue  []  Patient limited by pain   []  Patient limited by medical complications  []  Other:     Assessment: initiated posture/position awareness, thoracic articulation into extension, pec stretches, scap ex and shoulder ex encouraging opening of anterior chest and thoracic extension/neutral position. Issued HEP to patient. Tolerated ex well. Did not complete ex for balance or LE strength due to soreness from last session and hip having bone spur (right). Will progress gradually for balance and LE strength. Body Structures/Functions/Activity Limitations: impaired activity tolerance, impaired balance, impaired ROM, impaired safety awareness, impaired strength, pain and abnormal gait  Prognosis: good    GOALS:  Patient Goal: To become more knowledgeable of Osteoporosis management. Have an exercise program that will help build bone mass. Be able to return to a good ex program addressing balance, right hip and strength. Use the fitness equipment that she has at her home properly due to osteoporosis     Short Term Goals:  Time Frame: 4 weeks  1. Patient to complete chair stand test from 8x to 10x using LEs/core instead of supporting hands on thighs to sit<>stand. 2. Patient to demonstrate increased balance: SLS from 13 -17 seconds to 20 seconds, Tandem stance from 23-30 seconds to 45 seconds with improved stance time through RLE and balance. 3. Patient to demonstrate increased strength at back extensors, core and LEs at hip musculature with hip ability to improve functional mobility with ease with transfers with chair stand test without UE support on armrests or thighs, ability to complete a functional partial squat and to full squat if right hip pain does not interfere with these functional activities. 4. Patient to demonstrate increased ROM at right hip: right hip flexion (SKTC) 110 degrees to 120 degrees actively, SLR from 65 degrees to 80 degrees. With increased ease with mobility with crossing legs, placing pants on, partial squat, lifting RLE. Long Term Goals:  Time Frame: 8 weeks  1.  Patient to demonstrate

## 2021-04-16 ENCOUNTER — HOSPITAL ENCOUNTER (OUTPATIENT)
Dept: PHYSICAL THERAPY | Age: 71
Setting detail: THERAPIES SERIES
Discharge: HOME | End: 2021-04-16
Payer: MEDICARE

## 2021-04-16 PROCEDURE — 97110 THERAPEUTIC EXERCISES: CPT

## 2021-04-16 NOTE — PROGRESS NOTES
[address] [facility]  PHYSICAL THERAPY  [] EVALUATION  [x] DAILY NOTE (LAND) [] DAILY NOTE (AQUATIC ) [] PROGRESS NOTE [] DISCHARGE NOTE    [x] [facility] - [place]   [] [place] [address]    [] [facility]   [] Rosio Andrade    Date: 2021  Patient Name:  Zack Lemus  : 1950  MRN: [id number]  CSN: [id number]    Referring Practitioner Gustavo Peres, *   Diagnosis Age-related osteoporosis without current pathological fracture [M81.0]    Treatment Diagnosis Decreased balance with single heel raise and single leg stance, functional strength sit<>stand,    Date of Evaluation 21    Additional Pertinent History HTN, deg. Disc disease, OA-hands, bilateral knees, removal of Baker's cyst right knee, history of fractured patella right knee 7-8 years ago, osteoporosis. Hypothyroid, trigeminal nerve pain. Cervical/neck issues, history of herniated discs at lumbar. Functional Outcome Measure Used Chair stand test    Functional Outcome Score 8x in 30 seconds. WFLs if  <than 8x equals risk for mobility, disability and fraility.   (21)       Insurance: Primary: Payor: Jesúscharan Aguirre /  /  / ,   Secondary: [facility]   Authorization Information: Unlimited visits based on medical necessity. Aquatic yes, modalities yes, iontophoresis, HP/CP are not covered. No precertification. Visit # 3, 3/10 for progress note   Visits Allowed: Per medical necessity   Recertification Date:    Physician Follow-Up: Follow up in 2022. Physician Orders: Bone fragility: PT    History of Present Illness: Patient has been on bone building medication: Fosomax and had right hip pain, skin blisters/itching, teeth sensitivity  took medication for 18 weeks. Noted decreased height by 1 1/2 inches,      SUBJECTIVE: Right hip is feeling better today. Has not been on TM for a couple of weeks yet. Not having to take Ibuprofen or Advil for past 5 days.    Right knee has been bothering her the most. Feels like that it needs further strengthening. TREATMENT   Precautions: Osteoporosis no endrange rotation, lateral flexion of spine, No loaded spine flexion, Concentrate on strengthening spinal extensors, position awareness, ADLS, balance and fall prevention. Osteoporosis /bone fragility program.    Pain: 3/10 right hip with walking/wb through RLE,      X in shaded column indicates activity completed today   Modalities Parameters/  Location  Notes                     Manual Therapy Time/Technique  Notes                     Exercise/Intervention   Notes   ADLs: proper bed mobility with supine <>sit using logroll technique. General information regarding the bone fragility program in PT. Review of T scores on Dexascan              Neck retraction  5x  x    Thoracic extension with ball behind mid thoracic region 5x 5-10 count x    Hands clasped behind head horizontal abduction with elbows opening and closing 5x  x    Retro shoulder rolls 10x  x    pec stretch in corner 3x Hold 10             Standing back against wall:       Horizontal abduction/adduction palms up  10x  x    Standing ER bilaterally  10x  x    Shoulder extension with neck rotation  3x  x    sidelying book opening exercises r/l 5x  x    Thoracic extension mobilization supine with foam roller  5x  x    Child's pose with thoracic extension 5x  x           Bridge 10x  x    Supine in hooklying: hands clasped behind the head open and close elbows 10x  x                                                                   Tandem stance on foam r/l, l/r 2x  x    Single leg stance left/right 2x  x                    Specific Interventions Next Treatment: Osteoporosis/Bone fragility program. Patient has right hip pain limiting functional mobility with squats, chair stand, decreased balance with SLS, single heelraises. Decreased right hip strength, right hip pain. Concentrate on spine extensor strengthening. Avoiding flexion/loaded flexion of spine. Balance, LE strength, core strength. Bone fragility program with ADLS, fall prevention and exercise principles. Activity/Treatment Tolerance:right hip pain. decreased right hip ROM actively  [x]  Patient tolerated treatment well  []  Patient limited by fatigue  []  Patient limited by pain   []  Patient limited by medical complications  []  Other:     Assessment: Progressed with thoracic extension ex and rotation articulation in sidelying and supine with foam roller. flor pose for thoracic extension and balance. Reviewed first sessions exercises and noted good follow through with the exercises. No hip or knee pain at end of session. Right hip 1-2/10 minor flor pose. Body Structures/Functions/Activity Limitations: impaired activity tolerance, impaired balance, impaired ROM, impaired safety awareness, impaired strength, pain and abnormal gait  Prognosis: good    GOALS:  Patient Goal: To become more knowledgeable of Osteoporosis management. Have an exercise program that will help build bone mass. Be able to return to a good ex program addressing balance, right hip and strength. Use the fitness equipment that she has at her home properly due to osteoporosis     Short Term Goals:  Time Frame: 4 weeks  1. Patient to complete chair stand test from 8x to 10x using LEs/core instead of supporting hands on thighs to sit<>stand. 2. Patient to demonstrate increased balance: SLS from 13 -17 seconds to 20 seconds, Tandem stance from 23-30 seconds to 45 seconds with improved stance time through RLE and balance. 3. Patient to demonstrate increased strength at back extensors, core and LEs at hip musculature with hip ability to improve functional mobility with ease with transfers with chair stand test without UE support on armrests or thighs, ability to complete a functional partial squat and to full squat if right hip pain does not interfere with these functional activities.   4.

## 2021-04-19 ENCOUNTER — APPOINTMENT (OUTPATIENT)
Dept: PHYSICAL THERAPY | Age: 71
End: 2021-04-19
Payer: MEDICARE

## 2021-04-20 ENCOUNTER — HOSPITAL ENCOUNTER (OUTPATIENT)
Dept: PHYSICAL THERAPY | Age: 71
Setting detail: THERAPIES SERIES
Discharge: HOME | End: 2021-04-20
Payer: MEDICARE

## 2021-04-20 PROCEDURE — 97110 THERAPEUTIC EXERCISES: CPT

## 2021-04-20 NOTE — PROGRESS NOTES
[address] [facility]  PHYSICAL THERAPY  [] EVALUATION  [x] DAILY NOTE (LAND) [] DAILY NOTE (AQUATIC ) [] PROGRESS NOTE [] DISCHARGE NOTE    [x] OUTPATIENT REHABILITATION CENTER [facility]   [] [place] [address]    [] [facility]   [] Sheela May    Date: 2021  Patient Name:  Feliciano Gonzales  : 1950  MRN: [id number]  CSN: [id number]    Referring Practitioner Nicolle Newby, *   Diagnosis Age-related osteoporosis without current pathological fracture [M81.0]    Treatment Diagnosis Decreased balance with single heel raise and single leg stance, functional strength sit<>stand,    Date of Evaluation 21    Additional Pertinent History HTN, deg. Disc disease, OA-hands, bilateral knees, removal of Baker's cyst right knee, history of fractured patella right knee 7-8 years ago, osteoporosis. Hypothyroid, trigeminal nerve pain. Cervical/neck issues, history of herniated discs at lumbar. Functional Outcome Measure Used Chair stand test    Functional Outcome Score 8x in 30 seconds. WFLs if  <than 8x equals risk for mobility, disability and fraility.   (21)       Insurance: Primary: Payor: Kashif Pulldulce maria /  /  / ,   Secondary: [facility]   Authorization Information: Unlimited visits based on medical necessity. Aquatic yes, modalities yes, iontophoresis, HP/CP are not covered. No precertification. Visit # 4, 4/10 for progress note   Visits Allowed: Per medical necessity   Recertification Date:    Physician Follow-Up: Follow up in 2022. Physician Orders: Bone fragility: PT    History of Present Illness: Patient has been on bone building medication: Fosomax and had right hip pain, skin blisters/itching, teeth sensitivity  took medication for 18 weeks. Noted decreased height by 1 1/2 inches,      SUBJECTIVE: Patient continues to work on exercises at home. Minimal pain noted today and only with weight bearing.  Patient with complaints of Right foot pain flexion/loaded flexion of spine. Balance, LE strength, core strength. Bone fragility program with ADLS, fall prevention and exercise principles. Activity/Treatment Tolerance:right hip pain. decreased right hip ROM actively  [x]  Patient tolerated treatment well  []  Patient limited by fatigue  []  Patient limited by pain   []  Patient limited by medical complications  []  Other:     Assessment:  Patient with good recall of exercise techniques. No complaints of increase pain. Started session with balance activities. Patient continues to have difficulty maintaining balance for extended time. GOALS:  Patient Goal: To become more knowledgeable of Osteoporosis management. Have an exercise program that will help build bone mass. Be able to return to a good ex program addressing balance, right hip and strength. Use the fitness equipment that she has at her home properly due to osteoporosis     Short Term Goals:  Time Frame: 4 weeks  1. Patient to complete chair stand test from 8x to 10x using LEs/core instead of supporting hands on thighs to sit<>stand. 2. Patient to demonstrate increased balance: SLS from 13 -17 seconds to 20 seconds, Tandem stance from 23-30 seconds to 45 seconds with improved stance time through RLE and balance. 3. Patient to demonstrate increased strength at back extensors, core and LEs at hip musculature with hip ability to improve functional mobility with ease with transfers with chair stand test without UE support on armrests or thighs, ability to complete a functional partial squat and to full squat if right hip pain does not interfere with these functional activities. 4. Patient to demonstrate increased ROM at right hip: right hip flexion (SKTC) 110 degrees to 120 degrees actively, SLR from 65 degrees to 80 degrees. With increased ease with mobility with crossing legs, placing pants on, partial squat, lifting RLE. Long Term Goals:  Time Frame: 8 weeks  1.  Patient to demonstrate knowledge of Osteoporosis with applications with ADLS, fall prevention, proper exercise principles and independent with an HEP to follow following proper principles with strengthening core, hips, back extensors, ROM, balance. 2. Chair stand test to 12x in 30 seconds with improved functional strength, balance, and mobility. Patient Education:   []  HEP/Education Completed: ADLS with proper bed mobility, bone fragility informational material on management through proper ADLS, fall prevention, exercise principles. [employer] [id number]   []  No new Education completed  [x]  Reviewed Prior HEP      [x]  Patient verbalized and/or demonstrated understanding of education provided. []  Patient unable to verbalize and/or demonstrate understanding of education provided. Will continue education. []  Barriers to learning: none    PLAN:  Treatment Recommendations: Strengthening, Range of Motion, Balance Training, Functional Mobility Training, Transfer Training, Neuromuscular Re-education, Pain Management, Home Exercise Program, Patient Education and Safety Education and Training    []  Plan of care initiated. Plan to see patient 1-2 times per week for 8 weeks to address the treatment planned outlined above.   [x]  Continue with current plan of care  []  Modify plan of care as follows:    []  Hold pending physician visit  []  Discharge    Time In 1300   Time Out 1345   Timed Code Minutes: 45 min   Total Treatment Time: 45 min       Electronically Signed by: Amanda Rush

## 2021-04-23 ENCOUNTER — HOSPITAL ENCOUNTER (OUTPATIENT)
Dept: PHYSICAL THERAPY | Age: 71
Setting detail: THERAPIES SERIES
Discharge: HOME | End: 2021-04-23
Payer: MEDICARE

## 2021-04-23 PROCEDURE — 97110 THERAPEUTIC EXERCISES: CPT

## 2021-04-23 NOTE — PROGRESS NOTES
Osteoporosis no endrange rotation, lateral flexion of spine, No loaded spine flexion, Concentrate on strengthening spinal extensors, position awareness, ADLS, balance and fall prevention. Osteoporosis /bone fragility program.    Pain: 0-1/10 right hip with walking/wb through Right LE,      X in shaded column indicates activity completed today   Modalities Parameters/  Location  Notes               Manual Therapy Time/Technique  Notes               Exercise/Intervention   Notes   ADLs: proper bed mobility with supine <>sit using logroll technique. General information regarding the bone fragility program in PT. Review of T scores on Dexascan              Neck retraction  5x 5 seconds x    Thoracic extension with ball behind mid thoracic region 5x 5-10 seconds     Hands clasped behind head horizontal abduction with elbows opening and closing 5x      Retro shoulder rolls 10x      Pec stretch in corner 3x 10 seconds         x    Standing back against wall:       Horizontal abduction/adduction palms up  10x peach x    Standing ER bilaterally  10x Peach  x    Shoulder extension with neck rotation  5x peach x    Shoulder rows 10x Peach  x    Thoracic extension mobilization supine with soft blue foam roller 10x  x    Child's pose with thoracic extension 5x  x                  Sidelying book opening exercises 5x bilateral   x    Bridge and then bridge with marching 10x, 5x 5 seconds x    Supine in hooklying: hands clasped behind the head open and close elbows 10x  x                  Tandem stance on foam (bilateral lead) 2x 30 seconds x    Single leg stance left/right 2x 10 seconds x    3 way hip ex with single pole   x10 x             Specific Interventions Next Treatment: Osteoporosis/Bone fragility program. Patient has right hip pain limiting functional mobility with squats, chair stand, decreased balance with SLS, single heelraises. Decreased right hip strength, right hip pain.  Concentrate on spine extensor strengthening. Avoiding flexion/loaded flexion of spine. Balance, LE strength, core strength. Bone fragility program with ADLS, fall prevention and exercise principles. Activity/Treatment Tolerance:right hip pain with 3 way hip. Decreased reps   [x]  Patient tolerated treatment well  []  Patient limited by fatigue  []  Patient limited by pain   []  Patient limited by medical complications  []  Other:     Assessment:  Progressed ex with bridges with marches, 3 way hip without resistance band loop. Added resistance band with UE posterior shoulder girdle exercises. Good progression with only mild right hip pain with 3 way hip with closed chain ex. Will monitor and decrease reps pending as tolerated. Issued peach resistance band for her ex. In HEP. GOALS:  Patient Goal: To become more knowledgeable of Osteoporosis management. Have an exercise program that will help build bone mass. Be able to return to a good ex program addressing balance, right hip and strength. Use the fitness equipment that she has at her home properly due to osteoporosis     Short Term Goals:  Time Frame: 4 weeks  1. Patient to complete chair stand test from 8x to 10x using LEs/core instead of supporting hands on thighs to sit<>stand. 2. Patient to demonstrate increased balance: SLS from 13 -17 seconds to 20 seconds, Tandem stance from 23-30 seconds to 45 seconds with improved stance time through RLE and balance. 3. Patient to demonstrate increased strength at back extensors, core and LEs at hip musculature with hip ability to improve functional mobility with ease with transfers with chair stand test without UE support on armrests or thighs, ability to complete a functional partial squat and to full squat if right hip pain does not interfere with these functional activities. 4. Patient to demonstrate increased ROM at right hip: right hip flexion (SKTC) 110 degrees to 120 degrees actively, SLR from 65 degrees to 80 degrees.  With increased ease with mobility with crossing legs, placing pants on, partial squat, lifting RLE. Long Term Goals:  Time Frame: 8 weeks  1. Patient to demonstrate knowledge of Osteoporosis with applications with ADLS, fall prevention, proper exercise principles and independent with an HEP to follow following proper principles with strengthening core, hips, back extensors, ROM, balance. 2. Chair stand test to 12x in 30 seconds with improved functional strength, balance, and mobility. Patient Education: Added bridge with marches and 3 way hip without resistance band loop  []  HEP/Education Completed: ADLS with proper bed mobility, bone fragility informational material on management through proper ADLS, fall prevention, exercise principles. Aia 16 [id number]   []  No new Education completed  [x]  Reviewed Prior HEP      [x]  Patient verbalized and/or demonstrated understanding of education provided. []  Patient unable to verbalize and/or demonstrate understanding of education provided. Will continue education. [x]  Barriers to learning: none    PLAN:  Treatment Recommendations: Strengthening, Range of Motion, Balance Training, Functional Mobility Training, Transfer Training, Neuromuscular Re-education, Pain Management, Home Exercise Program, Patient Education and Safety Education and Training    []  Plan of care initiated. Plan to see patient 1-2 times per week for 8 weeks to address the treatment planned outlined above.   [x]  Continue with current plan of care  []  Modify plan of care as follows:    []  Hold pending physician visit  []  Discharge    Time In 1302   Time Out 1345   Timed Code Minutes: 43   Total Treatment Time: 43       Electronically Signed by: Lance Mays

## 2021-04-28 ENCOUNTER — HOSPITAL ENCOUNTER (OUTPATIENT)
Dept: PHYSICAL THERAPY | Age: 71
Setting detail: THERAPIES SERIES
Discharge: HOME | End: 2021-04-28
Payer: MEDICARE

## 2021-04-28 PROCEDURE — 97110 THERAPEUTIC EXERCISES: CPT

## 2021-04-28 NOTE — PROGRESS NOTES
[address] [facility]  PHYSICAL THERAPY  [] EVALUATION  [x] DAILY NOTE (LAND) [] DAILY NOTE (AQUATIC ) [] PROGRESS NOTE [] DISCHARGE NOTE    [x] OUTPATIENT REHABILITATION CENTER [facility]   [] [place] [address]    [] [facility]   [] [place]    Date: 2021  Patient Name:  Swapnil Griffin  : 1950  MRN: [id number]  CSN: [id number]    Referring Practitioner Den , *   Diagnosis Age-related osteoporosis without current pathological fracture [M81.0]    Treatment Diagnosis Decreased balance with single heel raise and single leg stance, functional strength sit<>stand,    Date of Evaluation 21    Additional Pertinent History HTN, deg. Disc disease, OA-hands, bilateral knees, removal of Baker's cyst right knee, history of fractured patella right knee 7-8 years ago, osteoporosis. Hypothyroid, trigeminal nerve pain. Cervical/neck issues, history of herniated discs at lumbar. Functional Outcome Measure Used Chair stand test    Functional Outcome Score 8x in 30 seconds. WFLs if  <than 8x equals risk for mobility, disability and fraility.   (21)       Insurance: Primary: Payor: Mechelle Garza /  /  / ,   Secondary: [facility]   Authorization Information: Unlimited visits based on medical necessity. Aquatic yes, modalities yes, iontophoresis, HP/CP are not covered. No precertification. Visit # 6, 6/10 for progress note   Visits Allowed: Per medical necessity   Recertification Date:    Physician Follow-Up: Follow up in 2022. Physician Orders: Bone fragility: PT    History of Present Illness: Patient has been on bone building medication: Fosomax and had right hip pain, skin blisters/itching, teeth sensitivity  took medication for 18 weeks. Noted decreased height by 1 1/2 inches,    Subjective: Patient reports that she is getting better and noting improved flexibility and strength.  Noting some mild soreness through hips in the night when laying on her side. .     TREATMENT   Precautions: Osteoporosis no endrange rotation, lateral flexion of spine, No loaded spine flexion, Concentrate on strengthening spinal extensors, position awareness, ADLS, balance and fall prevention. Osteoporosis /bone fragility program.    Pain: 0/10      X in shaded column indicates activity completed today   Modalities Parameters/  Location  Notes               Manual Therapy Time/Technique  Notes               Exercise/Intervention   Notes   ADLs: proper bed mobility with supine <>sit using logroll technique. General information regarding the bone fragility program in PT. Review of T scores on Dexascan              Neck retraction  5x 5 seconds x    Thoracic extension with ball behind mid thoracic region 5x 5-10 seconds     Hands clasped behind head horizontal abduction with elbows opening and closing 5x      Retro shoulder rolls 10x  x    Pec stretch in corner 3x 10 seconds  x    Wall push ups  10x  x    Standing back against wall:       Horizontal abduction/adduction palms up  10x peach x    Standing ER bilaterally  10x Peach  x    Shoulder extension with neck rotation  5x peach x    Shoulder rows 10x Peach  x    Thoracic extension mobilization supine with soft blue foam roller 10x  x    Child's pose with thoracic extension 5x  x                  Sidelying book opening exercises 5x bilateral   x    Bridge and then bridge with marching 10x, 5x 5 seconds x    Supine in hooklying: hands clasped behind the head open and close elbows  10x  x    Prone: press up    x           Tandem stance on foam (bilateral lead) 2x 30 seconds x    Single leg stance left/right 2x 10 seconds x    3 way hip ex with single pole   x10 x    Sit<>stand from chair with blue foam cushion in it.   x5  x      Specific Interventions Next Treatment: Osteoporosis/Bone fragility program. Patient has right hip pain limiting functional mobility with squats, chair stand, decreased balance with SLS, single heelraises. Decreased right hip strength, right hip pain. Concentrate on spine extensor strengthening. Avoiding flexion/loaded flexion of spine. Balance, LE strength, core strength. Bone fragility program with ADLS, fall prevention and exercise principles. Activity/Treatment Tolerance:right hip pain with 3 way hip. Decreased reps   [x]  Patient tolerated treatment well  []  Patient limited by fatigue  []  Patient limited by pain   []  Patient limited by medical complications  []  Other:     Assessment:  Patient progressing well. Less right hip pain and improved posture/position awareness. Added wall push ups, sit<>stand ex, and prone press up today. Ex tolerated well with good follow through on previous ex educated in last session. Note challenged balance with tandem stance on foam. SBA/CGA   GOALS:  Patient Goal: To become more knowledgeable of Osteoporosis management. Have an exercise program that will help build bone mass. Be able to return to a good ex program addressing balance, right hip and strength. Use the fitness equipment that she has at her home properly due to osteoporosis     Short Term Goals:  Time Frame: 4 weeks  1. Patient to complete chair stand test from 8x to 10x using LEs/core instead of supporting hands on thighs to sit<>stand. 2. Patient to demonstrate increased balance: SLS from 13 -17 seconds to 20 seconds, Tandem stance from 23-30 seconds to 45 seconds with improved stance time through RLE and balance. 3. Patient to demonstrate increased strength at back extensors, core and LEs at hip musculature with hip ability to improve functional mobility with ease with transfers with chair stand test without UE support on armrests or thighs, ability to complete a functional partial squat and to full squat if right hip pain does not interfere with these functional activities.   4. Patient to demonstrate increased ROM at right hip: right hip flexion (SKTC) 110 degrees to 120 degrees

## 2021-04-30 ENCOUNTER — HOSPITAL ENCOUNTER (OUTPATIENT)
Dept: PHYSICAL THERAPY | Age: 71
Setting detail: THERAPIES SERIES
Discharge: HOME | End: 2021-04-30
Payer: MEDICARE

## 2021-04-30 PROCEDURE — 97110 THERAPEUTIC EXERCISES: CPT

## 2021-05-03 ENCOUNTER — HOSPITAL ENCOUNTER (OUTPATIENT)
Dept: PHYSICAL THERAPY | Age: 71
Setting detail: THERAPIES SERIES
Discharge: HOME | End: 2021-05-03
Payer: MEDICARE

## 2021-05-03 PROCEDURE — 97110 THERAPEUTIC EXERCISES: CPT

## 2021-05-03 NOTE — DISCHARGE SUMMARY
[address] [facility]  PHYSICAL THERAPY  [] EVALUATION  [] DAILY NOTE (LAND) [] DAILY NOTE (AQUATIC ) [] PROGRESS NOTE [x] DISCHARGE NOTE    [x] OUTPATIENT REHABILITATION [facility]   [] [place] [address]    [] [facility]   [] Sheela May    Date: 5/3/2021  Patient Name:  Feliciano Gonzales  : 1950  MRN: [id number]  CSN: [id number]    Referring Practitioner Nicolle Newby, *   Diagnosis Age-related osteoporosis without current pathological fracture [M81.0]    Treatment Diagnosis Decreased balance with single heel raise and single leg stance, functional strength sit<>stand,    Date of Evaluation 21    Additional Pertinent History HTN, deg. Disc disease, OA-hands, bilateral knees, removal of Baker's cyst right knee, history of fractured patella right knee 7-8 years ago, osteoporosis. Hypothyroid, trigeminal nerve pain. Cervical/neck issues, history of herniated discs at lumbar. Functional Outcome Measure Used Chair stand test    Functional Outcome Score 8x in 30 seconds. WFLs if  <than 8x equals risk for mobility, disability and fraility.   (21)  10x in 30 seconds 5/3/2021      Insurance: Primary: Payor: Kashif Anthony /  /  / ,   Secondary: [facility]   Authorization Information: Unlimited visits based on medical necessity. Aquatic yes, modalities yes, iontophoresis, HP/CP are not covered. No precertification. Visit # 8,  for progress note   Visits Allowed: Per medical necessity   Recertification Date:    Physician Follow-Up: Follow up in 2022. Physician Orders: Bone fragility: PT    History of Present Illness: Patient has been on bone building medication: Fosomax and had right hip pain, skin blisters/itching, teeth sensitivity  took medication for 18 weeks. Noted decreased height by 1 1/2 inches,    Subjective: Patient feels balance and flexibility are a lot better.  Patient reports of some initial soreness from ex with use of bands for her legs. Understands HEP issued to her. Understands low bone mass restrictions for ADLS/Exercises. TREATMENT   Precautions: Osteoporosis no endrange rotation, lateral flexion of spine, No loaded spine flexion, Concentrate on strengthening spinal extensors, position awareness, ADLS, balance and fall prevention. Osteoporosis /bone fragility program.    Pain: 0/10      X in shaded column indicates activity completed today   Modalities Parameters/  Location  Notes               Manual Therapy Time/Technique  Notes               Exercise/Intervention   Notes   ADLs: proper bed mobility with supine <>sit using logroll technique. General information regarding the bone fragility program in PT. Review of T scores on Dexascan       Shoulder retraction with depression 10x  x HEP OF EXERCISES BELOW as X   Neck retraction  10x 5 seconds x    Thoracic extension with ball behind mid thoracic region 5x 5-10 seconds     Hands clasped behind head horizontal abduction with elbows opening and closing 5x  x    Retro shoulder rolls 10x  x    Pec stretch in corner 3x 10 seconds  x    Wall push ups  15x  x    Standing back against wall:       Horizontal abduction/adduction palms up  10x peach x    Standing ER bilaterally  10x Peach  x    Shoulder extension with neck rotation  5x peach x    Shoulder rows 10x Peach  x    Thoracic extension mobilization supine with soft blue foam roller 10x  x    Child's pose with thoracic extension 5x  x                  Sidelying book opening exercises 5x bilateral   x    Bridge and then bridge with marching 10x, 5x 5 seconds x    Supine in hooklying: hands clasped behind the head open and close elbows  10x  x    Prone: press up    x    Standing heelraises on foam  15x      Tandem stance on foam (bilateral lead) 2x 30 seconds x    Single leg stance left/right 2x 10 seconds x    3 way hip ex with peach band   5x  x    Sit<>stand from chair with blue foam cushion in it.   x5  x Specific Interventions Next Treatment: Osteoporosis/Bone fragility program. Patient has right hip pain limiting functional mobility with squats, chair stand, decreased balance with SLS, single heelraises. Decreased right hip strength, right hip pain. Concentrate on spine extensor strengthening. Avoiding flexion/loaded flexion of spine. Balance, LE strength, core strength. Bone fragility program with ADLS, fall prevention and exercise principles. Activity/Treatment Tolerance:0/10 pain today. Reviewed HEP and noted good follow through with the exercises. [x]  Patient tolerated treatment well  []  Patient limited by fatigue  []  Patient limited by pain   []  Patient limited by medical complications  []  Other:     Assessment:  Reassessment today. Patient meeting goals and will be discharged today from PT> Patient demonstrates progress with strength, balance and right hip ROM and strength. Note excellent follow through with HEP as issued. Refer to goal summary for status. Discharge at this time. GOALS:  Patient Goal: To become more knowledgeable of Osteoporosis management. Have an exercise program that will help build bone mass. Be able to return to a good ex program addressing balance, right hip and strength. Use the fitness equipment that she has at her home properly due to osteoporosis   GOAL MET Patient would like to have a little more knowledge on fitness equipment such as [employer]. Understands HEP to follow in PT. Short Term Goals:  Time Frame: 4 weeks  1. Patient to complete chair stand test from 8x to 10x using LEs/core instead of supporting hands on thighs to sit<>stand. GOAL MET Patient able to complete chair stand test without use of UEs. Precautions regarding right hip. 2. Patient to demonstrate increased balance: SLS from 13 -17 seconds to 20 seconds, Tandem stance from 23-30 seconds to 45 seconds with improved stance time through RLE and balance.   GOAL MET SLS 30 seconds RLE, 45 seconds LLE. Tandem stance 45 second to 1 minute. 3. Patient to demonstrate increased strength at back extensors, core and LEs at hip musculature with hip ability to improve functional mobility with ease with transfers with chair stand test without UE support on armrests or thighs, ability to complete a functional partial squat and to full squat if right hip pain does not interfere with these functional activities. GOAL MET Patient progressed with HEP and in clinic ex program with improved functional mobility with transfers, partial squat, back extensor strength and articulation. 4. Patient to demonstrate increased ROM at right hip: right hip flexion (SKTC) 110 degrees to 120 degrees actively, SLR from 65 degrees to 80 degrees. With increased ease with mobility with crossing legs, placing pants on, partial squat, lifting RLE. GOAL MET right hip flexion with SKTC 125 degrees,  degrees. Long Term Goals:  Time Frame: 8 weeks  1. Patient to demonstrate knowledge of Osteoporosis with applications with ADLS, fall prevention, proper exercise principles and independent with an HEP to follow following proper principles with strengthening core, hips, back extensors, ROM, balance. 2. Chair stand test to 12x in 30 seconds with improved functional strength, balance, and mobility. Patient Education: Wall push ups, sit<>stand, prone press up. []  HEP/Education Completed: ADLS with proper bed mobility, bone fragility informational material on management through proper ADLS, fall prevention, exercise principles. [address] [id number]   []  No new Education completed  [x]  Reviewed Prior HEP      [x]  Patient verbalized and/or demonstrated understanding of education provided. []  Patient unable to verbalize and/or demonstrate understanding of education provided. Will continue education.   [x]  Barriers to learning: none    PLAN:  Treatment Recommendations: Strengthening, Range of Motion, Balance Training, Functional Mobility Training, Transfer Training, Neuromuscular Re-education, Pain Management, Home Exercise Program, Patient Education and Safety Education and Training    []  Plan of care initiated. Plan to see patient 1-2 times per week for 8 weeks to address the treatment planned outlined above.   []  Continue with current plan of care  []  Modify plan of care as follows:    []  Hold pending physician visit  [x]  Discharge    Time In 1301   Time Out 1346   Timed Code Minutes: 45   Total Treatment Time: 45       Electronically Signed by: Rivera Hunter, [address]

## 2021-05-05 ENCOUNTER — TELEPHONE (OUTPATIENT)
Dept: CASE MANAGEMENT | Age: 71
End: 2021-05-05

## 2021-05-05 NOTE — TELEPHONE ENCOUNTER
Name: Mahsa Yao  : 1950  MRN: [id number]    Oncology Navigation Follow-Up Note    Contact Type:  Telephone    Notes: Left message for patient to call back re: request for genetic evaluation. Request received on breast cancer risk assessment from 3/30/2021 from mammogram visit at [facility].     Electronically signed by Deepa Nichols RN on 2021 at 9:43 AM

## 2021-06-22 RX ORDER — LISINOPRIL 10 MG/1
10 TABLET ORAL DAILY
Qty: 90 TABLET | Refills: 1 | Status: SHIPPED | OUTPATIENT
Start: 2021-06-22 | End: 2021-12-13

## 2021-06-22 RX ORDER — LEVOTHYROXINE SODIUM 0.05 MG/1
TABLET ORAL
Qty: 90 TABLET | Refills: 1 | Status: SHIPPED | OUTPATIENT
Start: 2021-06-22 | End: 2021-07-02 | Stop reason: SDUPTHER

## 2021-06-22 NOTE — TELEPHONE ENCOUNTER
----- Message from Eron Duncan sent at 6/22/2021 11:56 AM EDT -----  Subject: Refill Request    QUESTIONS  Name of Medication? lisinopril (PRINIVIL;ZESTRIL) 10 MG tablet  Patient-reported dosage and instructions? 10mg 1x by mouth daily  How many days do you have left? 15  Preferred Pharmacy? [facility] phone number (if available)? [phone]  ---------------------------------------------------------------------------  --------------  CALL BACK INFO  What is the best way for the office to contact you? OK to leave message on   voicemail  Preferred Call Back Phone Number?  [phone]

## 2021-07-02 ENCOUNTER — TELEPHONE (OUTPATIENT)
Dept: FAMILY MEDICINE CLINIC | Age: 71
End: 2021-07-02

## 2021-07-02 RX ORDER — LEVOTHYROXINE SODIUM 0.05 MG/1
TABLET ORAL
Qty: 30 TABLET | Refills: 0 | Status: SHIPPED | OUTPATIENT
Start: 2021-07-02 | End: 2022-01-10 | Stop reason: SDUPTHER

## 2021-07-02 NOTE — TELEPHONE ENCOUNTER
Yes, she has not received that medication yet and is worried as she is completely out of Synthroid as of today and is wondering if she can get a partial refill sent to a physical pharmacy where she can  the synthroid just to last her until Tuesday. Please advise.

## 2021-07-02 NOTE — TELEPHONE ENCOUNTER
30 day supply being sent to [facility] on [address] due to patient being out of medication and waiting on Mail order prescription.

## 2021-07-02 NOTE — TELEPHONE ENCOUNTER
----- Message from Greg Camacho sent at 7/2/2021 10:08 AM EDT -----  Subject: Refill Request    QUESTIONS  Name of Medication? levothyroxine (SYNTHROID) 50 MCG tablet  Patient-reported dosage and instructions? One tablet a day  How many days do you have left? 0  Preferred Pharmacy? [facility] [id number] phone number (if available)? [phone]  Additional Information for Provider? PT has not received her Synthroid via   her mail delivery pharmacy and is completely out, she is asking for a   partial refill to get her through the long weekend, please advise.   ---------------------------------------------------------------------------  --------------  CALL BACK INFO  What is the best way for the office to contact you? OK to leave message on   voicemail  Preferred Call Back Phone Number?  [phone]

## 2021-09-24 ENCOUNTER — HOSPITAL ENCOUNTER (OUTPATIENT)
Age: 71
Discharge: HOME | End: 2021-09-24
Payer: MEDICARE

## 2021-09-24 LAB
ANION GAP SERPL CALCULATED.3IONS-SCNC: 10 MEQ/L (ref 8–16)
BASOPHILS # BLD: 1.2 %
BASOPHILS ABSOLUTE: 0.1 THOU/MM3 (ref 0–0.1)
BUN BLDV-MCNC: 19 MG/DL (ref 7–22)
CALCIUM SERPL-MCNC: 10.3 MG/DL (ref 8.5–10.5)
CHLORIDE BLD-SCNC: 101 MEQ/L (ref 98–111)
CO2: 31 MEQ/L (ref 23–33)
CREAT SERPL-MCNC: 1 MG/DL (ref 0.4–1.2)
EKG ATRIAL RATE: 63 BPM
EKG P AXIS: 52 DEGREES
EKG P-R INTERVAL: 142 MS
EKG Q-T INTERVAL: 360 MS
EKG QRS DURATION: 88 MS
EKG QTC CALCULATION (BAZETT): 368 MS
EKG R AXIS: 70 DEGREES
EKG T AXIS: 48 DEGREES
EKG VENTRICULAR RATE: 63 BPM
EOSINOPHIL # BLD: 2.6 %
EOSINOPHILS ABSOLUTE: 0.2 THOU/MM3 (ref 0–0.4)
ERYTHROCYTE [DISTWIDTH] IN BLOOD BY AUTOMATED COUNT: 12.9 % (ref 11.5–14.5)
ERYTHROCYTE [DISTWIDTH] IN BLOOD BY AUTOMATED COUNT: 45.2 FL (ref 35–45)
GFR SERPL CREATININE-BSD FRML MDRD: 55 ML/MIN/1.73M2
GLUCOSE BLD-MCNC: 90 MG/DL (ref 70–108)
HCT VFR BLD CALC: 41.8 % (ref 37–47)
HEMOGLOBIN: 13.5 GM/DL (ref 12–16)
IMMATURE GRANS (ABS): 0.01 THOU/MM3 (ref 0–0.07)
IMMATURE GRANULOCYTES: 0.2 %
LYMPHOCYTES # BLD: 27.2 %
LYMPHOCYTES ABSOLUTE: 1.8 THOU/MM3 (ref 1–4.8)
MCH RBC QN AUTO: 31 PG (ref 26–33)
MCHC RBC AUTO-ENTMCNC: 32.3 GM/DL (ref 32.2–35.5)
MCV RBC AUTO: 95.9 FL (ref 81–99)
MONOCYTES # BLD: 7.7 %
MONOCYTES ABSOLUTE: 0.5 THOU/MM3 (ref 0.4–1.3)
NUCLEATED RED BLOOD CELLS: 0 /100 WBC
PLATELET # BLD: 366 THOU/MM3 (ref 130–400)
PMV BLD AUTO: 9 FL (ref 9.4–12.4)
POTASSIUM SERPL-SCNC: 3.7 MEQ/L (ref 3.5–5.2)
RBC # BLD: 4.36 MILL/MM3 (ref 4.2–5.4)
SEG NEUTROPHILS: 61.1 %
SEGMENTED NEUTROPHILS ABSOLUTE COUNT: 4.1 THOU/MM3 (ref 1.8–7.7)
SODIUM BLD-SCNC: 142 MEQ/L (ref 135–145)
VITAMIN D 25-HYDROXY: 50 NG/ML (ref 30–100)
WBC # BLD: 6.7 THOU/MM3 (ref 4.8–10.8)

## 2021-09-24 PROCEDURE — 80048 BASIC METABOLIC PNL TOTAL CA: CPT

## 2021-09-24 PROCEDURE — 36415 COLL VENOUS BLD VENIPUNCTURE: CPT

## 2021-09-24 PROCEDURE — 82306 VITAMIN D 25 HYDROXY: CPT

## 2021-09-24 PROCEDURE — 85025 COMPLETE CBC W/AUTO DIFF WBC: CPT

## 2021-09-24 PROCEDURE — 93005 ELECTROCARDIOGRAM TRACING: CPT | Performed by: PODIATRIST

## 2021-09-24 PROCEDURE — 93010 ELECTROCARDIOGRAM REPORT: CPT | Performed by: NUCLEAR MEDICINE

## 2021-09-27 ENCOUNTER — TELEPHONE (OUTPATIENT)
Dept: FAMILY MEDICINE CLINIC | Age: 71
End: 2021-09-27

## 2021-09-27 NOTE — TELEPHONE ENCOUNTER
----- Message from Adin Gaucher sent at 9/27/2021  9:10 AM EDT -----  Subject: Results Request    QUESTIONS  Which lab or imaging result is the patient calling about? bloodwork   Which provider ordered the test? Ab Aponte   At what location was the test performed? Date the test was performed? 2021-09-24  Additional Information for Provider? Patient is wanting to know more about   her results. ---------------------------------------------------------------------------  --------------  Marcos SULLIVAN  What is the best way for the office to contact you? OK to leave message on   voicemail  Preferred Call Back Phone Number?  [phone]

## 2021-09-27 NOTE — TELEPHONE ENCOUNTER
Patient called regarding lab results and EKG completed on 9/24/2021. Kaleb Coronado was the ordering provider. Patient originally was scheduled for a pre op today however she is waiting an additional 4 wks to determine if surgery is needed with . Please review labs and ekg -- advise.

## 2021-09-30 NOTE — TELEPHONE ENCOUNTER
Spoke to patient regarding message from [address], [facility]  patient already established with a cardiologist and will contact them to schedule. No additional questions at this time.

## 2021-10-13 ENCOUNTER — OFFICE VISIT (OUTPATIENT)
Dept: FAMILY MEDICINE CLINIC | Age: 71
End: 2021-10-13
Payer: MEDICARE

## 2021-10-13 VITALS
RESPIRATION RATE: 12 BRPM | SYSTOLIC BLOOD PRESSURE: 140 MMHG | HEART RATE: 66 BPM | WEIGHT: 146 LBS | TEMPERATURE: 97.9 F | BODY MASS INDEX: 22.91 KG/M2 | DIASTOLIC BLOOD PRESSURE: 88 MMHG | HEIGHT: 67 IN

## 2021-10-13 DIAGNOSIS — Z00.00 ROUTINE GENERAL MEDICAL EXAMINATION AT A HEALTH CARE FACILITY: Primary | ICD-10-CM

## 2021-10-13 PROCEDURE — G8484 FLU IMMUNIZE NO ADMIN: HCPCS | Performed by: FAMILY MEDICINE

## 2021-10-13 PROCEDURE — 3017F COLORECTAL CA SCREEN DOC REV: CPT | Performed by: FAMILY MEDICINE

## 2021-10-13 PROCEDURE — G0008 ADMIN INFLUENZA VIRUS VAC: HCPCS | Performed by: FAMILY MEDICINE

## 2021-10-13 PROCEDURE — 90694 VACC AIIV4 NO PRSRV 0.5ML IM: CPT | Performed by: FAMILY MEDICINE

## 2021-10-13 PROCEDURE — 1123F ACP DISCUSS/DSCN MKR DOCD: CPT | Performed by: FAMILY MEDICINE

## 2021-10-13 PROCEDURE — G0439 PPPS, SUBSEQ VISIT: HCPCS | Performed by: FAMILY MEDICINE

## 2021-10-13 PROCEDURE — 4040F PNEUMOC VAC/ADMIN/RCVD: CPT | Performed by: FAMILY MEDICINE

## 2021-10-13 RX ORDER — BACLOFEN 5 MG/1
5 TABLET ORAL EVERY EVENING
Qty: 90 TABLET | Refills: 1 | Status: SHIPPED | OUTPATIENT
Start: 2021-10-13 | End: 2022-02-09 | Stop reason: SDUPTHER

## 2021-10-13 RX ORDER — CYCLOBENZAPRINE HCL 5 MG
5 TABLET ORAL 3 TIMES DAILY PRN
Qty: 90 TABLET | Refills: 0 | Status: CANCELLED | OUTPATIENT
Start: 2021-10-13

## 2021-10-13 SDOH — ECONOMIC STABILITY: FOOD INSECURITY: WITHIN THE PAST 12 MONTHS, YOU WORRIED THAT YOUR FOOD WOULD RUN OUT BEFORE YOU GOT MONEY TO BUY MORE.: PATIENT DECLINED

## 2021-10-13 SDOH — ECONOMIC STABILITY: FOOD INSECURITY: WITHIN THE PAST 12 MONTHS, THE FOOD YOU BOUGHT JUST DIDN'T LAST AND YOU DIDN'T HAVE MONEY TO GET MORE.: PATIENT DECLINED

## 2021-10-13 ASSESSMENT — PATIENT HEALTH QUESTIONNAIRE - PHQ9
2. FEELING DOWN, DEPRESSED OR HOPELESS: 0
SUM OF ALL RESPONSES TO PHQ QUESTIONS 1-9: 0
SUM OF ALL RESPONSES TO PHQ QUESTIONS 1-9: 0
SUM OF ALL RESPONSES TO PHQ9 QUESTIONS 1 & 2: 0
1. LITTLE INTEREST OR PLEASURE IN DOING THINGS: 0
SUM OF ALL RESPONSES TO PHQ QUESTIONS 1-9: 0

## 2021-10-13 NOTE — PROGRESS NOTES
Cardiovascular Disease Risk Counseling: Assessed the patient's risk to develop cardiovascular disease and reviewed main risk factors. Reviewed steps to reduce disease risk including:   · Quitting tobacco use, reducing amount smoked, or not starting the habit  · Making healthy food choices  · Being physically active and gradualy increasing activity levels   · Reduce weight and determine a healthy BMI goal  · Monitor blood pressure and treat if higher than 140/90 mmHg  · Maintain blood total cholesterol levels under 5 mmol/l or 190 mg/dl  · Maintain LDL cholesterol levels under 3.0 mmol/l or 115 mg/dl   · Control blood glucose levels  · Consider taking aspirin (75 mg daily), once blood pressure is controlled   Provided a follow up plan. Time spent (minutes): 3      Medicare Annual Wellness Visit     Name: Li Eric Date: 10/13/2021   MRN: [id number] Sex: Female   Age: 73 y.o. Ethnicity: Non- / Non    : 1950 Race: White (non-)      sIac Sanon is here for Medicare AWV    Screenings for behavioral, psychosocial and functional/safety risks, and cognitive dysfunction are all negative except as indicated below. These results, as well as other patient data from the [address] form, are documented in Flowsheets linked to this Encounter. Allergies   Allergen Reactions    Amoxicillin Diarrhea    Darvon [Propoxyphene Hcl] Other (See Comments)     Feeling of extremity numbness.  Seasonal       cat, grass, dust, mold    Sudafed [Pseudoephedrine Hcl] Other (See Comments)     Restless feeling. Prior to Visit Medications    Medication Sig Taking?  Authorizing Provider   Menaquinone-7 (VITAMIN K2 PO) Take 150 mcg by mouth Daily Yes Historical Provider, MD   zinc 50 MG CAPS Take by mouth Yes Historical Provider, MD   Selenium (SELENIMIN-200 PO) Take by mouth Yes Historical Provider, MD   MAGNESIUM PO Take 120 mg by mouth 2 times daily Yes Historical Osteoarthritis 1982    Osteopenia 2008    PUD (peptic ulcer disease) 2009    Recurrent low back pain 1992    disc disease       Past Surgical History:   Procedure Laterality Date    BUNIONECTOMY  1991    rt    COLONOSCOPY  2005    Due 2014 Dr Jordy Nair    wisdom teeth extraction    HYSTERECTOMY  2001    KNEE ARTHROSCOPY  2004    lt meniscus repair     LAPAROSCOPY  1983    abd infection    SKIN CANCER EXCISION  07-17-13    Dr Kevin Mendoza  2001         Family History   Problem Relation Age of Onset    High Blood Pressure Father     Other Father 76        abd anyuerism    Heart Disease Father         abdominal aortic aneurysm    Thyroid Disease Mother 43    Other Mother 21        psoriasis    Heart Failure Mother 76    Heart Disease Mother 79        A fib     Breast Cancer Mother 61    Heart Disease Brother 6        rheumatic fever as child     Heart Disease Maternal Uncle         unknown type of disease     Pancreatic Cancer Paternal Aunt     Cancer Maternal Grandmother         sarcoma hip    Heart Disease Sister 64        CAD, s/p triple bypass    Diabetes Paternal Grandmother     Breast Cancer Niece 29    BRCA 2 Positive Niece 29        POSITIVE    Pancreatic Cancer Paternal Aunt     Cancer Maternal Cousin         sarcoma of eye       CareTeam (Including outside providers/suppliers regularly involved in providing care):   Patient Care Team:  Harsha Pérez MD as PCP - Yaya Myers MD as PCP - [facility] Empaneled Provider  Anthony Middleton (Dermatology)    Wt Readings from Last 3 Encounters:   10/13/21 146 lb (66.2 kg)   04/05/21 147 lb (66.7 kg)   03/22/21 149 lb 9.6 oz (67.9 kg)     Vitals:    10/13/21 1104   BP: (!) 140/88   Site: Left Upper Arm   Position: Sitting   Cuff Size: Large Adult   Pulse: 66   Resp: 12   Temp: 97.9 °F (36.6 °C)   TempSrc: Temporal   Weight: 146 lb (66.2 kg)   Height: 5' 6.5\" (1.689 m)     Body mass index is 23.21 kg/m². Based upon direct observation of the patient, evaluation of cognition reveals recent and remote memory intact. Patient's complete Health Risk Assessment and screening values have been reviewed and are found in Flowsheets. The following problems were reviewed today and where indicated follow up appointments were made and/or referrals ordered. Positive Risk Factor Screenings with Interventions:     Fall Risk:  Timed Up and Go Test > 12 seconds? (Complete if either Fall Risk answers are Yes): no  2 or more falls in past year?: no  Fall with injury in past year?: (!) yes  Fall Risk Interventions:    · Home safety tips provided  · she trip getting out of the house, but not balance problems          General Health and ACP:  General  In general, how would you say your health is?: Very Good  In the past 7 days, have you experienced any of the following?  New or Increased Pain, New or Increased Fatigue, Loneliness, Social Isolation, Stress or Anger?: (!) New or Increased Pain  Do you get the social and emotional support that you need?: Yes  Do you have a Living Will?: Yes  Advance Directives     Power of [employer] Will ACP-Advance Directive ACP-Power of     Not on File Not on File Not on File Not on File      General Health Risk Interventions:  · fracture left foot     Hearing/Vision:  No exam data present  Hearing/Vision  Do you or your family notice any trouble with your hearing that hasn't been managed with hearing aids?: No  Do you have difficulty driving, watching TV, or doing any of your daily activities because of your eyesight?: No  Have you had an eye exam within the past year?: (!) No  Hearing/Vision Interventions:  · Vision concerns:  patient encouraged to make appointment with his/her eye specialist    Safety:  Safety  Do you have working smoke detectors?: (!) No  Have all throw rugs been removed or fastened?: Yes  Do you have non-slip mats or surfaces in all bathtubs/showers?: Yes  Do all of your stairways have a railing or banister?: Yes  Are your doorways, halls and stairs free of clutter?: Yes  Do you always fasten your seatbelt when you are in a car?: Yes  Safety Interventions:  · Home safety tips provided     Personalized Preventive Plan   Current Health Maintenance Status  Immunization History   Administered Date(s) Administered    COVID-19, Moderna, PF, 100mcg/0.5mL 04/21/2021, 05/19/2021    FLUZONE 3 YEARS AND OVER 09/04/2013    Influenza 12/01/2015    Influenza Vaccine, unspecified formulation 10/01/2016    Influenza Virus Vaccine 10/13/2017, 10/16/2018, 10/08/2019    Influenza, High Dose (Fluzone 65 yrs and older) 10/01/2019    Influenza, Quadv, adjuvanted, 65 yrs +, IM, PF (Fluad) 10/12/2020, 10/13/2021    Pneumococcal Conjugate 13-valent ([id number]) 01/14/2015    Pneumococcal Polysaccharide ([id number]) 01/11/2017    Rabies 07/14/2018, 07/17/2018, 07/30/2018, 08/13/2018    Tdap (Boostrix, Adacel) 08/31/2012, 07/14/2018    Zoster Recombinant (Shingrix) 01/22/2020, 05/20/2020        Health Maintenance   Topic Date Due    Annual Wellness Visit (AWV)  08/04/2021    TSH testing  10/13/2021    Pneumococcal 65+ years Vaccine (2 of 2 - PPSV23) 01/11/2022    Breast cancer screen  03/30/2022    Potassium monitoring  09/24/2022    Creatinine monitoring  09/24/2022    Colon cancer screen colonoscopy  07/14/2024    Lipid screen  12/12/2024    DTaP/Tdap/Td vaccine (3 - Td or Tdap) 07/14/2028    DEXA (modify frequency per FRAX score)  Completed    Flu vaccine  Completed    Shingles Vaccine  Completed    COVID-19 Vaccine  Completed    Hepatitis C screen  Completed    Hepatitis A vaccine  Aged Out    Hepatitis B vaccine  Aged Out    Hib vaccine  Aged Out    Meningococcal (ACWY) vaccine  Aged Out     Recommendations for [employer] Due: see orders and patient instructions/AVS.  .   Recommended screening schedule for the next 5-10 years is provided to the patient in written form: see Patient Instructions/AVS.    Merlin Gerold was seen today for medicare aw. Diagnoses and all orders for this visit:    Routine general medical examination at a health care facility    Other orders  -     Baclofen (LIORESAL) 5 MG tablet;  Take 1 tablet by mouth every evening  -     INFLUENZA, QUADV, ADJUVANTED, 65 YRS =, IM, PF, PREFILL SYR, 0.5ML (FLUAD)             Markus Overton MD

## 2021-10-13 NOTE — PATIENT INSTRUCTIONS
You may receive a survey about your visit with us today. The feedback from our patients helps us identify what is working well and where the service to all patients can be enhanced. Thank you! Low Back Pain: Exercises  Introduction  Here are some examples of exercises for you to try. The exercises may be suggested for a condition or for rehabilitation. Start each exercise slowly. Ease off the exercises if you start to have pain. You will be told when to start these exercises and which ones will work best for you. How to do the exercises      Press-up   1. Lie on your stomach, supporting your body with your forearms. 2. Press your elbows down into the floor to raise your upper back. As you do this, relax your stomach muscles and allow your back to arch without using your back muscles. As your press up, do not let your hips or pelvis come off the floor. 3. Hold for 15 to 30 seconds, then relax. 4. Repeat 2 to 4 times. Alternate arm and leg (bird dog) exercise   Do this exercise slowly. Try to keep your body straight at all times, and do not let one hip drop lower than the other. 1. Start on the floor, on your hands and knees. 2. Tighten your belly muscles. 3. Raise one leg off the floor, and hold it straight out behind you. Be careful not to let your hip drop down, because that will twist your trunk. 4. Hold for about 6 seconds, then lower your leg and switch to the other leg. 5. Repeat 8 to 12 times on each leg. 6. Over time, work up to holding for 10 to 30 seconds each time. 7. If you feel stable and secure with your leg raised, try raising the opposite arm straight out in front of you at the same time. Knee-to-chest exercise   1. Lie on your back with your knees bent and your feet flat on the floor. 2. Bring one knee to your chest, keeping the other foot flat on the floor (or keeping the other leg straight, whichever feels better on your lower back).   3. Keep your lower back pressed to the floor. Hold for at least 15 to 30 seconds. 4. Relax, and lower the knee to the starting position. 5. Repeat with the other leg. Repeat 2 to 4 times with each leg. 6. To get more stretch, put your other leg flat on the floor while pulling your knee to your chest.    Curl-ups   1. Lie on the floor on your back with your knees bent at a 90-degree angle. Your feet should be flat on the floor, about 12 inches from your buttocks. 2. Cross your arms over your chest. If this bothers your neck, try putting your hands behind your neck (not your head), with your elbows spread apart. 3. Slowly tighten your belly muscles and raise your shoulder blades off the floor. 4. Keep your head in line with your body, and do not press your chin to your chest.  5. Hold this position for 1 or 2 seconds, then slowly lower yourself back down to the floor. 6. Repeat 8 to 12 times. Pelvic tilt exercise   1. Lie on your back with your knees bent. 2. \"Brace\" your stomach. This means to tighten your muscles by pulling in and imagining your belly button moving toward your spine. You should feel like your back is pressing to the floor and your hips and pelvis are rocking back. 3. Hold for about 6 seconds while you breathe smoothly. 4. Repeat 8 to 12 times. Heel dig bridging   1. Lie on your back with both knees bent and your ankles bent so that only your heels are digging into the floor. Your knees should be bent about 90 degrees. 2. Then push your heels into the floor, squeeze your buttocks, and lift your hips off the floor until your shoulders, hips, and knees are all in a straight line. 3. Hold for about 6 seconds as you continue to breathe normally, and then slowly lower your hips back down to the floor and rest for up to 10 seconds. 4. Do 8 to 12 repetitions. Hamstring stretch in doorway   1. Lie on your back in a doorway, with one leg through the open door. 2. Slide your leg up the wall to straighten your knee.  You should feel a gentle stretch down the back of your leg. 3. Hold the stretch for at least 15 to 30 seconds. Do not arch your back, point your toes, or bend either knee. Keep one heel touching the floor and the other heel touching the wall. 4. Repeat with your other leg. 5. Do 2 to 4 times for each leg. Hip flexor stretch   1. Kneel on the floor with one knee bent and one leg behind you. Place your forward knee over your foot. Keep your other knee touching the floor. 2. Slowly push your hips forward until you feel a stretch in the upper thigh of your rear leg. 3. Hold the stretch for at least 15 to 30 seconds. Repeat with your other leg. 4. Do 2 to 4 times on each side. Wall sit   1. Stand with your back 10 to 12 inches away from a wall. 2. Lean into the wall until your back is flat against it. 3. Slowly slide down until your knees are slightly bent, pressing your lower back into the wall. 4. Hold for about 6 seconds, then slide back up the wall. 5. Repeat 8 to 12 times. Follow-up care is a key part of your treatment and safety. Be sure to make and go to all appointments, and call your doctor if you are having problems. It's also a good idea to know your test results and keep a list of the medicines you take. Where can you learn more? Go to https://Reaqua Systemspemaineb.Cornerstone OnDemand. org and sign in to your BitLeap account. Enter H131 in the [employer] box to learn more about \"Low Back Pain: Exercises. \"     If you do not have an account, please click on the \"Sign Up Now\" link. Current as of: November 16, 2020               Content Version: 12.9  © 3663-2188 Healthwise, Incorporated. Care instructions adapted under license by [facility] ([facility]). If you have questions about a medical condition or this instruction, always ask your healthcare professional. [place] [address] any warranty or liability for your use of this information.       Acute Low Back Pain: Exercises  Introduction  Here are some examples of typical rehabilitation exercises for your condition. Start each exercise slowly. Ease off the exercise if you start to have pain. Your doctor or physical therapist will tell you when you can start these exercises and which ones will work best for you. When you are not being active, find a comfortable position for rest. Some people are comfortable on the floor or a medium-firm bed with a small pillow under their head and another under their knees. Some people prefer to lie on their side with a pillow between their knees. Don't stay in one position for too long. Take short walks (10 to 20 minutes) every 2 to 3 hours. Avoid slopes, hills, and stairs until you feel better. Walk only distances you can manage without pain, especially leg pain. How to do the exercises      Back stretches   1. Get down on your hands and knees on the floor. 2. Relax your head and allow it to droop. Round your back up toward the ceiling until you feel a nice stretch in your upper, middle, and lower back. Hold this stretch for as long as it feels comfortable, or about 15 to 30 seconds. 3. Return to the starting position with a flat back while you are on your hands and knees. 4. Let your back sway by pressing your stomach toward the floor. Lift your buttocks toward the ceiling. 5. Hold this position for 15 to 30 seconds. 6. Repeat 2 to 4 times. Follow-up care is a key part of your treatment and safety. Be sure to make and go to all appointments, and call your doctor if you are having problems. It's also a good idea to know your test results and keep a list of the medicines you take. Where can you learn more? Go to https://CrowdTwistchivo.1Lay. org and sign in to your PxRadia account. Enter G500 in the [employer] box to learn more about \"Acute Low Back Pain: Exercises. \"     If you do not have an account, please click on the \"Sign Up Now\" link.   Current as of: November 16, 2020 Content Version: 12.9  © 1283-0284 [employer]. Care instructions adapted under license by [facility] ([facility]). If you have questions about a medical condition or this instruction, always ask your healthcare professional. [address] any warranty or liability for your use of this information. Learning About Calcium  What is calcium? Calcium keeps your bones and muscles--including your heart--healthy and strong. Your body needs vitamin D to absorb calcium. People who don't get enough calcium and vitamin D throughout life have an increased chance of having thin and brittle bones (osteoporosis) in their later years. Thin and brittle bones break easily. They can lead to serious injuries. This is why it's important for you to get enough calcium and vitamin D as a child and as an adult. It helps keep your bones strong as you get older. And it protects you against possible breaks. Your body also uses vitamin D to help your muscles absorb calcium and work well. If your muscles don't get enough calcium, then they can cramp, hurt, or feel weak. You may have long-term (chronic) muscle aches and pains. How much calcium do you need? How much calcium you need each day changes as you age. Here are the recommended daily allowances (RDAs) for calcium:  · Ages 1 to 3 years: 700 milligrams  · Ages 4 to 8 years: 1,000 milligrams  · Ages 5 to 25 years: 1,300 milligrams  · Ages 23 to 48 years: 1,000 milligrams  · Males 46 to 79 years: 1,000 milligrams  · Females 46 to 79 years: 1,200 milligrams  · Ages 70 and older: 1,200 milligrams  Women who are pregnant or breastfeeding need the same amount of calcium and vitamin D as other women their age. How can you get enough calcium? Calcium is in foods such as milk, cheese, and yogurt. Vegetables like broccoli, kale, and Chinese cabbage also have it. You can get calcium if you eat the soft edible bones in canned sardines and canned salmon.  Foods with added (fortified) calcium include some cereals, juices, soy drinks, and tofu. The food label will show how much of it was added. You can figure out how much calcium is in a food by looking at the percent daily value section on the nutrition facts label. The food label assumes the daily value of calcium is 1,300 mg. So if one serving of a food has a daily value of 20% of calcium, that food has 260 mg of calcium in one serving. Some people who don't get enough calcium may need supplements. Two common calcium supplements are calcium citrate and calcium carbonate. Calcium carbonate is best absorbed when it is taken with food. Calcium citrate can be absorbed well with or without food. Spreading calcium out over the course of the day can reduce stomach upset. And your body absorbs it better when it is spread over the day. Try not to take more than 500 mg of calcium supplement at a time. Where can you learn more? Go to https://Iridigm Display Corporation.SpinGo. org and sign in to your [email or web address] account. Enter S264 in the [employer]) box to learn more about \"Learning About Calcium. \"     If you do not have an account, please click on the \"Sign Up Now\" link. Current as of: December 17, 2020               Content Version: 12.9  © 2006-2021 Healthwise, Incorporated. Care instructions adapted under license by [facility] ([facility]). If you have questions about a medical condition or this instruction, always ask your healthcare professional. [place] [address] any warranty or liability for your use of this information. Learning About High-Calcium Foods  What foods are high in calcium? The foods you eat contain nutrients, such as vitamins and minerals. Calcium is a nutrient. Your body needs the right amount to stay healthy and work as it should. You can use the list below to help you make choices about which foods to eat. Here are some foods that contain calcium.   Fruits  · Figs, normal range used by all laboratories. You're likely getting enough vitamin D if your levels are in the range of 20 to 50 ng/mL. How can you get more vitamin D? Foods that contain vitamin D include:  · [place], tuna, and mackerel. These are some of the best foods to eat when you need to get more vitamin D.  · Cheese, egg yolks, and beef liver. These foods have vitamin D in small amounts. · Milk, soy drinks, orange juice, yogurt, margarine, and some kinds of cereal have vitamin D added to them. Some people don't make vitamin D as well as others. They may have to take extra care in getting enough vitamin D. Things that reduce how much vitamin D your body makes include:  · Dark skin, such as many  Americans have. · Age, especially if you are older than 72. · Digestive problems, such as Crohn's or celiac disease. · Liver and kidney disease. Some people who do not get enough vitamin D may need supplements. Are there any risks from taking vitamin D? 1. Too much vitamin D:  ? Can damage your kidneys. ? Can cause nausea and vomiting, constipation, and weakness. ? Raises the amount of calcium in your blood. If this happens, you can get confused or have an irregular heart rhythm. 2. Vitamin D may interact with other medicines. Tell your doctor about all of the medicines you take, including over-the-counter drugs, herbs, and pills. Tell your doctor about all of your current medical problems. Where can you learn more? Go to https://Cardiovascular Provider Resource Holdingspedavid.Shelby.tv. org and sign in to your Sincuru account. Enter [id number] in the [facility] box to learn more about \"Learning About Vitamin D. \"     If you do not have an account, please click on the \"Sign Up Now\" link. Current as of: December 17, 2020               Content Version: 12.9  © 5380-2311 Healthwise, Incorporated. Care instructions adapted under license by [facility] ([facility]).  If you have questions about a medical condition or this instruction, always ask your healthcare professional. [place] [address] any warranty or liability for your use of this information. Home Safety Alarms: Care Instructions  Your Care Instructions  Home safety alarms save lives. For example, a smoke alarm can detect small amounts of smoke. This can give you time to escape from a fire. And a carbon monoxide alarm can let you know about this deadly gas before it starts to make you sick. It's important to have both kinds of alarms near all the sleeping areas and on each level of your home. You can buy alarms with different features. For example, if you have a smoke alarm that is set off by steam or cooking smoke, you can buy one with a hush alarm. This lets you push a button that turns off the alarm and makes it less sensitive for a short time. If you put in new alarms, look for long-life alarms with lithium batteries. You may also want to look for ones that can detect both smoke and carbon monoxide. In a newer home, alarms are wired in by an . This type of alarm is electric, with a backup battery. Your local fire department can give you more information on how to prevent fires and carbon monoxide poisoning. They can also help you make a fire escape plan, use fire safety devices, and provide first-aid. Follow-up care is a key part of your treatment and safety. Be sure to make and go to all appointments, and call your doctor if you are having problems. It's also a good idea to know your test results and keep a list of the medicines you take. How can you care for yourself at home? 3. Install smoke alarms and carbon monoxide alarms in your house. 4. Put smoke alarms:  ? On each level of your home, in the hallway outside sleeping areas, and inside each bedroom. ? In the center of a ceiling, or on a wall 6 to 12 inches from the ceiling. This is where smoke goes first. Avoid places near doors, windows, or air ducts.   5. Put a carbon monoxide alarm in the hallway outside of the bedrooms in each sleeping area of the house. The alarm should be placed high on the wall. Make sure that the alarm can't be covered up by furniture or drapes. 6. Make sure your safety alarms are working at all times. You can test them every month by pressing the test button. 7. If an alarm makes a chirping sound, replace the battery right away. 8. Replace non-lithium batteries twice a year. Put this on your calendar ahead of time. Some people change alarm batteries when they reset their clocks in the spring and fall. 9. Replace smoke alarms every 10 years. 10. Plan and practice fire escape routes. Make sure you have at least two for each area of your home. This includes upper stories and the basement. When should you call for help? Call 911 anytime you think you may need emergency care. For example, call if:    · A smoke or carbon monoxide alarm sounds. Tell everyone to get out of the building. Stand outside until firefighters arrive. Watch closely for changes in your health, and be sure to contact your doctor if you have questions about how to use a home safety alarm. Where can you learn more? Go to https://CupomNowpeAriane Systems.BABL Media. org and sign in to your Webymaster account. Enter Y342 in the [facility] box to learn more about \"Home Safety Alarms: Care Instructions. \"     If you do not have an account, please click on the \"Sign Up Now\" link. Current as of: February 18, 2021               Content Version: 12.9  © 2006-2021 Healthwise, Incorporated. Care instructions adapted under license by [facility] ([facility]). If you have questions about a medical condition or this instruction, always ask your healthcare professional. [place] [address] any warranty or liability for your use of this information. Learning About Getting In and Out of a Bathtub Safely  Introduction  Many falls happen during bathing.  All that water makes the bathroom a slippery place.  · You may no longer be able to step over the tub wall comfortably and safely. · You might lean on things that aren't meant to support your weight, like a towel bar or the shower curtain. There are several types of aids that will help keep you safe. You can buy them at [employer] or home improvement stores or online. What tools can you use to get in and out of the tub? Tub rail and grab bar   There are many types of bars and rails you can install on the walls next to your tub or on the edge of the tub. They will help keep you safe while you're getting in or out of the tub. It's important to have them permanently installed, rather than attached with suction. Transfer bench   There are several kinds of benches or seats to use in the bathtub. One type sits in the tub and extends out over the side. You sit on the bench. Lift one leg at a time into the tub, sliding your body over as you do so. Another common tub bench sits inside the tub. To use this type you need to be able to safely step into the tub before sitting down. Nonskid mats   Water makes both the floor of the tub and the bathroom floor slippery. You can buy adhesive strips that stick to the floor of the tub. Or you can use a nonskid tub mat. Outside the tub, make sure you use a rug with a nonskid bottom. Don't use a plain towel. Hand-held shower faucet   With a hand-held faucet, you can get clean without having to lower yourself into the tub. Hang a shower curtain to keep water from spilling out onto the floor. Follow-up care is a key part of your treatment and safety. Be sure to make and go to all appointments, and call your doctor if you are having problems. It's also a good idea to know your test results and keep a list of the medicines you take. Where can you learn more? Go to https://mecca.Carwow. org and sign in to your Stupil account.  Enter X812 in the [employer] box to learn more about \"Learning About Getting In and Out of a Bathtub Safely. \"     If you do not have an account, please click on the \"Sign Up Now\" link. Current as of: November 16, 2020               Content Version: 12.9  © 2006-2021 Healthwise, Incorporated. Care instructions adapted under license by [facility] ([facility]). If you have questions about a medical condition or this instruction, always ask your healthcare professional. [address] any warranty or liability for your use of this information. Learning About Getting In and Out of a Car Safely  Introduction  Most people get into a car by stooping to move sideways through the door. They put in one leg, sit down, and then bring in the other leg. But if you have problems with mobility or balance, getting into a car this way might be difficult or unsafe. It's easier and safer to sit down in the car first, and then move your legs into the car after you're seated. And if the ground is slick or icy, this method is safer for everyone. Try this:  5. When you get to the door, turn around so that you're facing away from the car. Reach back to hold on to something stable, such as the seat or the door frame. 6. Sit down so that you are sitting sideways on the seat. Be careful not to hit your head on the top of the door jamb. 7. Slide around so that you're facing front, and lift your first leg in. Then lift your second leg in. To get out of the car, do the same steps in reverse order:  8. When the door is open, lift your first leg out the door and put your foot on the ground. 9. As you do the same with your second leg, slide around so you are facing out the door. 10. Use the seat or door frame for support as you lean forward and push yourself up to a standing position. If your car seat has fabric upholstery, you might find that it's hard to slide around. Try covering the seat with something to make it easier to slide on, like a piece of plastic or vinyl.  Make sure it doesn't get in the way of your seat belt. If you still have trouble, ask your physical therapist or occupational therapist to show you the best way to get in and out of a car. He or she can also tell you about tools that can make this easier for you. What tools can help you get in and out of a car safely? Grab bar and door strap   7. There are several types of handholds that you can install on the frame of your car door or beside the door. 8. They can give you something to hold on to as you get in and out of the car seat. Swivel seat   7. A swivel seat is like a lazy Lilli Limb or a turntable. You sit down facing sideways and then use it to turn forward as you pull your legs in. Seat belt extender   5. You may have a hard time dealing with a normal seat belt. An extension may help you find and reach the end of the belt more easily. Follow-up care is a key part of your treatment and safety. Be sure to make and go to all appointments, and call your doctor if you are having problems. It's also a good idea to know your test results and keep a list of the medicines you take. Where can you learn more? Go to https://LocaMap.ID90T. org and sign in to your Hansoft account. Enter F261 in the [facility] box to learn more about \"Learning About Getting In and Out of a Car Safely. \"     If you do not have an account, please click on the \"Sign Up Now\" link. Current as of: November 16, 2020               Content Version: 12.9  © 2006-2021 Healthwise, Incorporated. Care instructions adapted under license by [facility] ([facility]). If you have questions about a medical condition or this instruction, always ask your healthcare professional. [place] [address] any warranty or liability for your use of this information. Learning About Calcium  What is calcium? Calcium keeps your bones and muscles--including your heart--healthy and strong.   Your body needs vitamin D to absorb calcium. People who don't get enough calcium and vitamin D throughout life have an increased chance of having thin and brittle bones (osteoporosis) in their later years. Thin and brittle bones break easily. They can lead to serious injuries. This is why it's important for you to get enough calcium and vitamin D as a child and as an adult. It helps keep your bones strong as you get older. And it protects you against possible breaks. Your body also uses vitamin D to help your muscles absorb calcium and work well. If your muscles don't get enough calcium, then they can cramp, hurt, or feel weak. You may have long-term (chronic) muscle aches and pains. How much calcium do you need? How much calcium you need each day changes as you age. Here are the recommended daily allowances (RDAs) for calcium:  · Ages 1 to 3 years: 700 milligrams  · Ages 4 to 8 years: 1,000 milligrams  · Ages 5 to 25 years: 1,300 milligrams  · Ages 23 to 48 years: 1,000 milligrams  · Males 46 to 79 years: 1,000 milligrams  · Females 46 to 79 years: 1,200 milligrams  · Ages 70 and older: 1,200 milligrams  Women who are pregnant or breastfeeding need the same amount of calcium and vitamin D as other women their age. How can you get enough calcium? Calcium is in foods such as milk, cheese, and yogurt. Vegetables like broccoli, kale, and Chinese cabbage also have it. You can get calcium if you eat the soft edible bones in canned sardines and canned salmon. Foods with added (fortified) calcium include some cereals, juices, soy drinks, and tofu. The food label will show how much of it was added. You can figure out how much calcium is in a food by looking at the percent daily value section on the nutrition facts label. The food label assumes the daily value of calcium is 1,300 mg. So if one serving of a food has a daily value of 20% of calcium, that food has 260 mg of calcium in one serving.   Some people who don't get enough calcium may need them.  Some people don't make vitamin D as well as others. They may have to take extra care in getting enough vitamin D. Things that reduce how much vitamin D your body makes include:  · Dark skin, such as many  Americans have. · Age, especially if you are older than 72. · Digestive problems, such as Crohn's or celiac disease. · Liver and kidney disease. Some people who do not get enough vitamin D may need supplements. Are there any risks from taking vitamin D?  11. Too much vitamin D:  ? Can damage your kidneys. ? Can cause nausea and vomiting, constipation, and weakness. ? Raises the amount of calcium in your blood. If this happens, you can get confused or have an irregular heart rhythm. 12. Vitamin D may interact with other medicines. Tell your doctor about all of the medicines you take, including over-the-counter drugs, herbs, and pills. Tell your doctor about all of your current medical problems. Where can you learn more? Go to https://PawnUp.com.dotHIV. org and sign in to your MST account. Enter [id number] in the [employer] Halo box to learn more about \"Learning About Vitamin D. \"     If you do not have an account, please click on the \"Sign Up Now\" link. Current as of: December 17, 2020               Content Version: 12.9  © 2006-2021 Healthwise, Incorporated. Care instructions adapted under license by [facility] ([facility]). If you have questions about a medical condition or this instruction, always ask your healthcare professional. [place] [id number] any warranty or liability for your use of this information. Preventing Falls: Care Instructions  Taking care of yourself  · You may get dizzy if you do not drink enough water. To prevent dehydration, drink plenty of fluids. Choose water and other caffeine-free clear liquids.  If you have kidney, heart, or liver disease and have to limit fluids, talk with your doctor before you increase the amount of fluids you drink.  · Exercise regularly to improve your strength, muscle tone, and balance. Walk if you can. Swimming may be a good choice if you cannot walk easily. · Have your vision and hearing checked each year or any time you notice a change. If you have trouble seeing and hearing, you might not be able to avoid objects and could lose your balance. · Know the side effects of the medicines you take. Ask your doctor or pharmacist whether the medicines you take can affect your balance. Sleeping pills or sedatives can affect your balance. · Limit the amount of alcohol you drink. Alcohol can impair your balance and other senses. · Ask your doctor whether calluses or corns on your feet need to be removed. If you wear loose-fitting shoes because of calluses or corns, you can lose your balance and fall. · Talk to your doctor if you have numbness in your feet. Preventing falls at home  · Remove raised doorway thresholds, throw rugs, and clutter. Repair loose carpet or raised areas in the floor. · Move furniture and electrical cords to keep them out of walking paths. · Use nonskid floor wax, and wipe up spills right away, especially on ceramic tile floors. · If you use a walker or cane, put rubber tips on it. If you use crutches, clean the bottoms of them regularly with an abrasive pad, such as steel wool. · Keep your house well lit, especially [facility], and outside walkways. Use night-lights in areas such as hallways and bathrooms. Add extra light switches or use remote switches (such as switches that go on or off when you clap your hands) to make it easier to turn lights on if you have to get up during the night. · Install sturdy handrails on stairways. · Move items in your cabinets so that the things you use a lot are on the lower shelves (about waist level). · Keep a cordless phone and a flashlight with new batteries by your bed.  If possible, put a phone in each of the main rooms of your house, or carry a cell phone in case you fall and cannot reach a phone. Or, you can wear a device around your neck or wrist. You push a button that sends a signal for help. · Wear low-heeled shoes that fit well and give your feet good support. Use footwear with nonskid soles. Check the heels and soles of your shoes for wear. Repair or replace worn heels or soles. · Do not wear socks without shoes on wood floors. · Walk on the grass when the sidewalks are slippery. If you live in an area that gets snow and ice in the winter, sprinkle salt on slippery steps and sidewalks. Preventing falls in the bath  · Install grab bars and nonskid mats inside and outside your shower or tub and near the toilet and sinks. · Use shower chairs and bath benches. · Use a hand-held shower head that will allow you to sit while showering. · Get into a tub or shower by putting the weaker leg in first. Get out of a tub or shower with your strong side first.  · Repair loose toilet seats and consider installing a raised toilet seat to make getting on and off the toilet easier. · Keep your bathroom door unlocked while you are in the shower. How can you prevent falls outdoors? 13. Wear shoes with firm soles and low heels. If you have to walk on an icy surface, use grippers that can be worn over your shoes in bad weather. 14. Be extra careful if weather is bad. Walk on the grass when the sidewalks are slick. If you live in a place that gets snow and ice in the winter, sprinkle salt on slippery stairs and sidewalks. 15. Be careful getting on or off buses and trains or getting in and out of cars. If handrails are available, use them. 16. Be careful when you cross the street. Look for crosswalks or places where curb cuts or ramps are present. 17. Try not to hurry, especially if you are carrying something. 18. Be cautious in parking lots or garages. There may be curbs or changes in pavement, or the height of the pavement may vary.   19. Make sure to wear the correct eyeglasses, if you need them. Reading glasses or bifocals can make it harder to see hazards that might be in your way. 20. If you are walking outdoors for exercise, try to:  ? Walk in well-lighted, well-maintained areas. These include high school or college tracks, shopping malls, and public spaces. ? Walk with a partner. ? Watch out for cracked sidewalks, curbs, changes in the height of the pavement, exposed tree roots, and debris such as fallen leaves or branches. How can you care for yourself after a fall? If you think you can get up  First lie still for a few minutes and think about how you feel. If your body feels okay and you think you can get up safely, follow the rest of the steps below:  8. Look for a chair or other piece of furniture that is close to you. 9. Roll onto your side and rest. Roll by turning your head in the direction you want to roll, move your shoulder and arm, then hip and leg in the same direction. 8. Lie still for a moment to let your blood pressure adjust.  11. Slowly push your upper body up, lift your head, and take a moment to rest.  12. Slowly get up on your hands and knees, and crawl to the chair or other stable piece of furniture. 13. Put your hands on the chair. 14. Move one foot forward, and place it flat on the floor. Your other leg should be bent with the knee on the floor. 15. Rise slowly, turn your body, and sit in the chair. Stay seated for a bit and think about how you feel. Call for help. Even if you feel okay, let someone know what happened to you. You might not know that you have a serious injury. If you cannot get up  11. If you think you are injured after a fall or you cannot get up, try not to panic. 12. Call out for help. 13. If you have a phone within reach or you have an emergency call device, use it to call for help. 14. If you do not have a phone within reach, try to slide yourself toward it.  If you cannot get to the phone, try to slide toward a door or window or a place where you think you can be heard. 13. [place] or use an object to make noise so someone might hear you. 16. If you can reach something that you can use for a pillow, place it under your head. Try to stay warm by covering yourself with a blanket or clothing while you wait for help. When should you call for help? Call 911 anytime you think you may need emergency care. For example, call if:    · You passed out (lost consciousness). · You cannot get up after a fall. · You have severe pain. Call your doctor now or seek immediate medical care if:    · You have new or worse pain. · You are dizzy or lightheaded. · You hit your head. Watch closely for changes in your health, and be sure to contact your doctor if:    · You do not get better as expected. Home Safety Alarms: Care Instructions  Your Care Instructions  Home safety alarms save lives. For example, a smoke alarm can detect small amounts of smoke. This can give you time to escape from a fire. And a carbon monoxide alarm can let you know about this deadly gas before it starts to make you sick. It's important to have both kinds of alarms near all the sleeping areas and on each level of your home. You can buy alarms with different features. For example, if you have a smoke alarm that is set off by steam or cooking smoke, you can buy one with a hush alarm. This lets you push a button that turns off the alarm and makes it less sensitive for a short time. If you put in new alarms, look for long-life alarms with lithium batteries. You may also want to look for ones that can detect both smoke and carbon monoxide. In a newer home, alarms are wired in by an . This type of alarm is electric, with a backup battery. Your local fire department can give you more information on how to prevent fires and carbon monoxide poisoning.  They can also help you make a fire escape plan, use fire safety devices, and provide first-aid. Follow-up care is a key part of your treatment and safety. Be sure to make and go to all appointments, and call your doctor if you are having problems. It's also a good idea to know your test results and keep a list of the medicines you take. How can you care for yourself at home? 21. Install smoke alarms and carbon monoxide alarms in your house. 22. Put smoke alarms:  ? On each level of your home, in the hallway outside sleeping areas, and inside each bedroom. ? In the center of a ceiling, or on a wall 6 to 12 inches from the ceiling. This is where smoke goes first. Avoid places near doors, windows, or air ducts. 23. Put a carbon monoxide alarm in the hallway outside of the bedrooms in each sleeping area of the house. The alarm should be placed high on the wall. Make sure that the alarm can't be covered up by furniture or drapes. 24. Make sure your safety alarms are working at all times. You can test them every month by pressing the test button. 25. If an alarm makes a chirping sound, replace the battery right away. 26. Replace non-lithium batteries twice a year. Put this on your calendar ahead of time. Some people change alarm batteries when they reset their clocks in the spring and fall. 27. Replace smoke alarms every 10 years. 28. Plan and practice fire escape routes. Make sure you have at least two for each area of your home. This includes upper stories and the basement. When should you call for help? Call 911 anytime you think you may need emergency care. For example, call if:    · A smoke or carbon monoxide alarm sounds. Tell everyone to get out of the building. Stand outside until firefighters arrive. Watch closely for changes in your health, and be sure to contact your doctor if you have questions about how to use a home safety alarm. Where can you learn more? Go to https://mecca.[employer]. org and sign in to your Yanado account.  Enter N438 in the Search Health Information box to learn more about \"Home Safety Alarms: Care Instructions. \"     If you do not have an account, please click on the \"Sign Up Now\" link. Current as of: February 18, 2021               Content Version: 12.9  © 2006-2021 Healthwise, [facility]. Care instructions adapted under license by [facility] ([facility]). If you have questions about a medical condition or this instruction, always ask your healthcare professional. [place] [address] any warranty or liability for your use of this information. Learning About Getting In and Out of a Bathtub Safely  Introduction  Many falls happen during bathing. All that water makes the bathroom a slippery place. · You may no longer be able to step over the tub wall comfortably and safely. · You might lean on things that aren't meant to support your weight, like a towel bar or the shower curtain. There are several types of aids that will help keep you safe. You can buy them at [employer] or home improvement stores or online. What tools can you use to get in and out of the tub? Tub rail and grab bar   There are many types of bars and rails you can install on the walls next to your tub or on the edge of the tub. They will help keep you safe while you're getting in or out of the tub. It's important to have them permanently installed, rather than attached with suction. Transfer bench   There are several kinds of benches or seats to use in the bathtub. One type sits in the tub and extends out over the side. You sit on the bench. Lift one leg at a time into the tub, sliding your body over as you do so. Another common tub bench sits inside the tub. To use this type you need to be able to safely step into the tub before sitting down. Nonskid mats   Water makes both the floor of the tub and the bathroom floor slippery. You can buy adhesive strips that stick to the floor of the tub. Or you can use a nonskid tub mat.  Outside the tub, make sure you use a rug with a nonskid bottom. Don't use a plain towel. Hand-held shower faucet   With a hand-held faucet, you can get clean without having to lower yourself into the tub. Hang a shower curtain to keep water from spilling out onto the floor. Follow-up care is a key part of your treatment and safety. Be sure to make and go to all appointments, and call your doctor if you are having problems. It's also a good idea to know your test results and keep a list of the medicines you take. Where can you learn more? Go to https://Profectus Biosciencespepiceweb.PriceMe. org and sign in to your [employer] account. Enter M984 in the [employer] box to learn more about \"Learning About Getting In and Out of a Bathtub Safely. \"     If you do not have an account, please click on the \"Sign Up Now\" link. Current as of: November 16, 2020               Content Version: 12.9  © 2006-2021 Healthwise, [place]. Care instructions adapted under license by [facility] ([facility]). If you have questions about a medical condition or this instruction, always ask your healthcare professional. [place] [address] any warranty or liability for your use of this information. Learning About Getting In and Out of a Car Safely  Introduction  Most people get into a car by stooping to move sideways through the door. They put in one leg, sit down, and then bring in the other leg. But if you have problems with mobility or balance, getting into a car this way might be difficult or unsafe. It's easier and safer to sit down in the car first, and then move your legs into the car after you're seated. And if the ground is slick or icy, this method is safer for everyone. Try this:  16. When you get to the door, turn around so that you're facing away from the car. Reach back to hold on to something stable, such as the seat or the door frame. 17. Sit down so that you are sitting sideways on the seat.  Be careful not to hit your head on the top of the door jamb. 18. Slide around so that you're facing front, and lift your first leg in. Then lift your second leg in. To get out of the car, do the same steps in reverse order:  17. When the door is open, lift your first leg out the door and put your foot on the ground. 18. As you do the same with your second leg, slide around so you are facing out the door. 19. Use the seat or door frame for support as you lean forward and push yourself up to a standing position. If your car seat has fabric upholstery, you might find that it's hard to slide around. Try covering the seat with something to make it easier to slide on, like a piece of plastic or vinyl. Make sure it doesn't get in the way of your seat belt. If you still have trouble, ask your physical therapist or occupational therapist to show you the best way to get in and out of a car. He or she can also tell you about tools that can make this easier for you. What tools can help you get in and out of a car safely? Grab bar and door strap   9. There are several types of handholds that you can install on the frame of your car door or beside the door. 10. They can give you something to hold on to as you get in and out of the car seat. Swivel seat   8. A swivel seat is like a lazy Lilli Limb or a turntable. You sit down facing sideways and then use it to turn forward as you pull your legs in. Seat belt extender   6. You may have a hard time dealing with a normal seat belt. An extension may help you find and reach the end of the belt more easily. Follow-up care is a key part of your treatment and safety. Be sure to make and go to all appointments, and call your doctor if you are having problems. It's also a good idea to know your test results and keep a list of the medicines you take. Where can you learn more? Go to https://mecca.Hotelements. org and sign in to your Elite Form account.  Enter K696 in the [employer] box to learn more about \"Learning About Getting In and Out of a Car Safely. \"     If you do not have an account, please click on the \"Sign Up Now\" link. Current as of: November 16, 2020               Content Version: 12.9  © 1223-6187 Healthwise, Incorporated. Care instructions adapted under license by [facility] ([facility]). If you have questions about a medical condition or this instruction, always ask your healthcare professional. [place] [address] any warranty or liability for your use of this information. Personalized Preventive Plan for Gabby Lange - 10/13/2021  Medicare offers a range of preventive health benefits. Some of the tests and screenings are paid in full while other may be subject to a deductible, co-insurance, and/or copay. Some of these benefits include a comprehensive review of your medical history including lifestyle, illnesses that may run in your family, and various assessments and screenings as appropriate. After reviewing your medical record and screening and assessments performed today your provider may have ordered immunizations, labs, imaging, and/or referrals for you. A list of these orders (if applicable) as well as your Preventive Care list are included within your After Visit Summary for your review. Other Preventive Recommendations:    · A preventive eye exam performed by an eye specialist is recommended every 1-2 years to screen for glaucoma; cataracts, macular degeneration, and other eye disorders. · A preventive dental visit is recommended every 6 months. · Try to get at least 150 minutes of exercise per week or 10,000 steps per day on a pedometer . · Order or download the FREE \"Exercise & Physical Activity: Your Everyday Guide\" from [employer]. Call 4-787.391.3120 or search [employer] online. · You need 8461-2042 mg of calcium and 9679-6199 IU of vitamin D per day.  It is possible to meet your calcium requirement with diet alone, but a vitamin D supplement is usually necessary to meet this goal.  · When exposed to the sun, use a sunscreen that protects against both UVA and UVB radiation with an SPF of 30 or greater. Reapply every 2 to 3 hours or after sweating, drying off with a towel, or swimming. · Always wear a seat belt when traveling in a car. Always wear a helmet when riding a bicycle or motorcycle. Preventing Osteoporosis: After Your Visit  Your Care Instructions  Osteoporosis means the bones are weak and thin enough that they can break easily. The older you are, the more likely you are to get osteoporosis. But with plenty of calcium, vitamin D, and exercise, you can help prevent osteoporosis. The preteen and teen years are a key time for bone building. With the help of calcium, vitamin D, and exercise in those early years and beyond, the bones reach their peak density and strength by age 27. After age 27, your bones naturally start to thin and weaken. The stronger your bones are at around age 27, the lower your risk for osteoporosis. But no matter what your age and risk are, your bones still need calcium, vitamin D, and exercise to stay strong. Also avoid smoking, and limit alcohol. Smoking and heavy alcohol use can make your bones thinner. Talk to your doctor about any special risks you might have, such as having a close relative with osteoporosis or taking a medicine that can weaken bones. Your doctor can tell you the best ways to protect your bones from thinning. Follow-up care is a key part of your treatment and safety. Be sure to make and go to all appointments, and call your doctor if you are having problems. It's also a good idea to know your test results and keep a list of the medicines you take. How can you care for yourself at home? Get enough calcium and vitamin D. The [place] of [employer] recommends adults younger than age 46 need 1,000 mg of calcium and 600 IU of vitamin D each day.  Women ages 51 to 70 need 1,200 mg of calcium and 600 IU of vitamin D each day. Men ages 46 to 79 need 1,000 mg of calcium and 600 IU of vitamin D each day. Adults 71 and older need 1,200 mg of calcium and 800 IU of vitamin D each day. Eat foods rich in calcium, like yogurt, cheese, milk, and dark green vegetables. Eat foods rich in vitamin D, like eggs, fatty fish, cereal, and fortified milk. Get some sunshine. Your body uses sunshine to make its own vitamin D. The safest time to be out in the sun is before 10 a.m. or after 3 p.m. Avoid getting sunburned. Sunburn can increase your risk of skin cancer. Talk to your doctor about taking a calcium plus vitamin D supplement. Ask about what type of calcium is right for you, and how much to take at a time. Adults ages 23 to 48 should not get more than 2,500 mg of calcium and 4,000 IU of vitamin D each day, whether it is from supplements and/or food. Adults ages 46 and older should not get more than 2,000 mg of calcium and 4,000 IU of vitamin D each day from supplements and/or food. Get regular bone-building exercise. Weight-bearing and resistance exercises keep bones healthy by working the muscles and bones against gravity. Start out at an exercise level that feels right for you. Add a little at a time until you can do the following:  Do 30 minutes of weight-bearing exercise on most days of the week. Walking, jogging, stair climbing, and dancing are good choices. Do resistance exercises with weights or elastic bands 2 to 3 days a week. Limit alcohol. Drink no more than 1 alcohol drink a day if you are a woman. Drink no more than 2 alcohol drinks a day if you are a man. Do not smoke. Smoking can make bones thin faster. If you need help quitting, talk to your doctor about stop-smoking programs and medicines. These can increase your chances of quitting for good. When should you call for help?   Watch closely for changes in your health, and be sure to contact your doctor if:  You need help with a healthy eating plan. You need help with an exercise plan    © 2153-6384 Ronal Fisher. Care instructions adapted under license by [facility]. This care instruction is for use with your licensed healthcare professional. If you have questions about a medical condition or this instruction, always ask your healthcare professional. [address] any warranty or liability for your use of this information. Content Version: 9.4.59140; Last Revised: June 20, 2011              ·     Keep Your Memory Raquel Cowart       Many factors can affect your ability to remembera hectic lifestyle, aging, stress, chronic disease, and certain medicines. But, there are steps you can take to sharpen your mind and help preserve your memory. Challenge Your Brain   Regularly challenging your mind may help keeps it in top shape. Good mental exercises include:   Crossword puzzlesUse a dictionary if you need it; you will learn more that way. Brainteasers Try some! Crafts, such as wood working and sewing   Hobbies, such as gardening and building model airplanes   SocializingVisit old friends or join groups to meet new ones. Reading   Learning a new language   Taking a class, whether it be art history or merry chi   TravelingExperience the food, history, and culture of your destination   Learning to use a computer   Going to museums, the theater, or thought-provoking movies   Changing things in your daily life, such as reversing your pattern in the grocery store or brushing your teeth using your nondominant hand   Use Memory Aids   There is no need to remember every detail on your own. These memory aids can help:   Calendars and day planners   Electronic organizers to store all sorts of helpful informationThese devices can \"beep\" to remind you of appointments.    A book of days to record birthdays, anniversaries, and other occasions that occur on the same date every year   Detailed \"to-do\" lists and strategically placed sticky notes   Quick \"study\" sessionsBefore a gathering, review who will be there so their names will be fresh in your mind. Establish routinesFor example, keep your keys, wallet, and umbrella in the same place all the time or take medicine with your 8:00 AM glass of juice   Live a Healthy Life   Many actions that will keep your body strong will do the same for your mind. For example:   Talk to Your Doctor About Herbs and Supplements    Malnutrition and vitamin deficiencies can impair your mental function. For example, vitamin B12 deficiency can cause a range of symptoms, including confusion. But, what if your nutritional needs are being met? Can herbs and supplements still offer a benefit? Researchers have investigated a range of natural remedies, such as ginkgo , ginseng , and the supplement phosphatidylserine (PS). So far, though, the evidence is inconsistent as to whether these products can improve memory or thinking. If you are interested in taking herbs and supplements, talk to your doctor first because they may interact with other medicines that you are taking. Exercise Regularly    Among the many benefits of regular exercise are increased blood flow to the brain and decreased risk of certain diseases that can interfere with memory function. One study found that even moderate exercise has a beneficial effect. Examples of \"moderate\" exercise include:   Playing 18 holes of golf once a week, without a cart   Playing tennis twice a week   Walking one mile per day   Manage Stress    It can be tough to remember what is important when your mind is cluttered. Make time for relaxation. Choose activities that calm you down, and make it routine. Manage Chronic Conditions    Side effects of high blood pressure , diabetes, and heart disease can interfere with mental function. Many of the lifestyle steps discussed here can help manage these conditions.  Strive to eat a healthy diet, exercise regularly, get stress under control, and follow your doctor's advice for your condition. Minimize Medications    Talk to your doctor about the medicines that you take. Some may be unnecessary. Also, healthy lifestyle habits may lower the need for certain drugs. Last Reviewed: April 2010 Maulik Martinez MD   Updated: 4/13/2010   ·        Advance Care Planning: Care Instructions  Your Care Instructions     It can be hard to live with an illness that cannot be cured. But if your health is getting worse, you may want to make decisions about end-of-life care. Planning for the end of your life does not mean that you are giving up. It is a way to make sure that your wishes are met. Clearly stating your wishes can make it easier for your loved ones. Making plans while you are still able may also ease your mind and make your final days less stressful and more meaningful. Follow-up care is a key part of your treatment and safety. Be sure to make and go to all appointments, and call your doctor if you are having problems. It's also a good idea to know your test results and keep a list of the medicines you take. What can you do to plan for the end of life? You can bring these issues up with your doctor. You do not need to wait until your doctor starts the conversation. You might start with, \"What makes life worth living for me is. Marco Herrmann \" And then follow it with, \"I would not be willing to live with . Marco Herrmann \" When you complete this sentence it helps your doctor understand your wishes. Talk openly and honestly with your doctor. This is the best way to understand the decisions you will need to make as your health changes. Know that you can always change your mind. Ask your doctor about commonly used life-support measures. These include tube feedings, breathing machines, and fluids given through a vein (IV). Understanding these treatments will help you decide whether you want them.   You may choose to have these life-supporting treatments for a limited time. This allows a trial period to see whether they will help you. You may also decide that you want your doctor to take only certain measures to keep you alive. It may help to think about the big picture, like what makes life worth living for you or what your values and goals are. Talk to your doctor about how long you are likely to live. Your doctor may be able to give you an idea of what usually happens with your specific illness. Think about preparing papers that state your wishes. These papers are called advance directives. If you do this early and review them often, there will not be any confusion about what you want. You can change your instructions at any time. Which papers should you prepare? Advance directives are legal papers that tell doctors how you want to be cared for at the end of your life. You do not need a  to write these papers. Ask your doctor or your state [facility] department for information on how to write your advance directives. They may have the forms for each of these types of papers. Make sure your doctor has a copy of these on file, and give a copy to a family member or close friend. Consider a do-not-resuscitate order (DNR). This order asks that no extra treatments be done if your heart stops or you stop breathing. Extra treatments may include cardiopulmonary resuscitation (CPR), electrical shock to restart your heart, or a machine to breathe for you. If you decide to have a DNR order, ask your doctor to explain and write it. Place the order in your home where everyone can easily see it. Consider a living will. A living will explains your wishes about life support and other treatments at the end of your life if you become unable to speak for yourself. Living young tell doctors to use or not use treatments that would keep you alive. You must have one or two witnesses or a notary present when you sign this form.  A living will may be called something else in your state. Consider a medical power of . This form allows you to name a person to make decisions about your care if you are not able to. Most people ask a close friend or family member. Talk to this person about the kinds of treatments you want and those that you do not want. Make sure this person understands your wishes. A medical power of  may be called something else in your state. These legal papers are simple to change. Tell your doctor what you want to change, and ask him or her to make a note in your medical file. Give your family updated copies of the papers. Where can you learn more? Go to [email or web address] and sign in to your [employer] account. Enter P184 in the Triage box to learn more about \"Advance Care Planning: Care Instructions. \"     If you do not have an account, please click on the \"Sign Up Now\" link. Current as of: March 17, 2021               Content Version: 13.0  © 1858-6522 [employer], [employer]. Care instructions adapted under license by [facility] ([facility]). If you have questions about a medical condition or this instruction, always ask your healthcare professional. [place] [address] any warranty or liability for your use of this information. ·        Learning About Living Perroy  What is a living will? A living will, also called a declaration, is a legal form. It tells your family and your doctor your wishes when you can't speak for yourself. It's used by the health professionals who will treat you as you near the end of your life or if you get seriously hurt or ill. If you put your wishes in writing, your loved ones and others will know what kind of care you want. They won't need to guess. This can ease your mind and be helpful to others. And you can change or cancel your living will at any time. A living will is not the same as an estate or property will.  An estate will explains what you want to happen with your money and property after you die. How do you use it? A living will is used to describe the kinds of treatment or life support you want as you near the end of your life or if you get seriously hurt or ill. Keep these facts in mind about living young. Your living will is used only if you can't speak or make decisions for yourself. Most often, one or more doctors must certify that you can't speak or decide for yourself before your living will takes effect. If you get better and can speak for yourself again, you can accept or refuse any treatment. It doesn't matter what you said in your living will. Some states may limit your right to refuse treatment in certain cases. For example, you may need to clearly state in your living will that you don't want artificial hydration and nutrition, such as being fed through a tube. Is a living will a legal document? A living will is a legal document. Each state has its own laws about living young. And a living will may be called something else in your state. Here are some things to know about living young. You don't need an  to complete a living will. But legal advice can be helpful if your state's laws are unclear. It can also help if your health history is complicated or your family can't agree on what should be in your living will. You can change your living will at any time. Some people find that their wishes about end-of-life care change as their health changes. If you make big changes to your living will, complete a new form. If you move to another state, make sure that your living will is legal in the state where you now live. In most cases, doctors will respect your wishes even if you have a form from a different state. You might use a universal form that has been approved by many states. This kind of form can sometimes be filled out and stored online.  Your digital copy will then be available wherever you have a connection to the internet. The doctors and nurses who need to treat you can find it right away. Your state may offer an online registry. This is another place where you can store your living will online. It's a good idea to get your living will notarized. This means using a person called a [employer] to watch two people sign, or witness, your living will. What should you know when you create a living will? Here are some questions to ask yourself as you make your living will:  Do you know enough about life support methods that might be used? If not, talk to your doctor so you know what might be done if you can't breathe on your own, your heart stops, or you can't swallow. What things would you still want to be able to do after you receive life-support methods? Would you want to be able to walk? To speak? To eat on your own? To live without the help of machines? Do you want certain Spiritism practices performed if you become very ill? If you have a choice, where do you want to be cared for? In your home? At a hospital or nursing home? If you have a choice at the end of your life, where would you prefer to die? At home? In a hospital or nursing home? Somewhere else? Would you prefer to be buried or cremated? Do you want your organs to be donated after you die? What should you do with your living will? Make sure that your family members and your health care agent have copies of your living will (also called a declaration). Give your doctor a copy of your living will. Ask him or her to keep it as part of your medical record. If you have more than one doctor, make sure that each one has a copy. Put a copy of your living will where it can be easily found. For example, some people may put a copy on their refrigerator door. If you are using a digital copy, be sure your doctor, family members, and health care agent know how to find and access it. Where can you learn more?   Go to [email or web address] and sign in to your [employer] account. Enter K439 in the [facility] box to learn more about \"Learning About Living Perroy. \"     If you do not have an account, please click on the \"Sign Up Now\" link. Current as of: March 17, 2021               Content Version: 13.0  © 0255-1363 [employer]. Care instructions adapted under license by [facility] ([facility]). If you have questions about a medical condition or this instruction, always ask your healthcare professional. [place] [address] any warranty or liability for your use of this information.     ·

## 2021-10-13 NOTE — PROGRESS NOTES
Immunizations Administered     Name Date Dose Route    Influenza, Quadv, adjuvanted, 65 yrs +, IM, PF (Fluad) 10/13/2021 0.5 mL Intramuscular    Site: Deltoid- Left    Lot: [id number]    NDC: 21645-475-39

## 2021-10-21 ENCOUNTER — OFFICE VISIT (OUTPATIENT)
Dept: CARDIOLOGY CLINIC | Age: 71
End: 2021-10-21
Payer: MEDICARE

## 2021-10-21 VITALS
WEIGHT: 145.6 LBS | DIASTOLIC BLOOD PRESSURE: 91 MMHG | HEART RATE: 75 BPM | BODY MASS INDEX: 23.4 KG/M2 | SYSTOLIC BLOOD PRESSURE: 146 MMHG | HEIGHT: 66 IN

## 2021-10-21 DIAGNOSIS — R07.89 CHEST PAIN, ATYPICAL: ICD-10-CM

## 2021-10-21 DIAGNOSIS — E78.00 PURE HYPERCHOLESTEROLEMIA: ICD-10-CM

## 2021-10-21 DIAGNOSIS — R42 DIZZINESS: ICD-10-CM

## 2021-10-21 DIAGNOSIS — I10 PRIMARY HYPERTENSION: ICD-10-CM

## 2021-10-21 DIAGNOSIS — Z01.818 PRE-OP EVALUATION: Primary | ICD-10-CM

## 2021-10-21 PROCEDURE — 1036F TOBACCO NON-USER: CPT | Performed by: INTERNAL MEDICINE

## 2021-10-21 PROCEDURE — 1090F PRES/ABSN URINE INCON ASSESS: CPT | Performed by: INTERNAL MEDICINE

## 2021-10-21 PROCEDURE — G8427 DOCREV CUR MEDS BY ELIG CLIN: HCPCS | Performed by: INTERNAL MEDICINE

## 2021-10-21 PROCEDURE — G8484 FLU IMMUNIZE NO ADMIN: HCPCS | Performed by: INTERNAL MEDICINE

## 2021-10-21 PROCEDURE — 3017F COLORECTAL CA SCREEN DOC REV: CPT | Performed by: INTERNAL MEDICINE

## 2021-10-21 PROCEDURE — 99204 OFFICE O/P NEW MOD 45 MIN: CPT | Performed by: INTERNAL MEDICINE

## 2021-10-21 PROCEDURE — 4040F PNEUMOC VAC/ADMIN/RCVD: CPT | Performed by: INTERNAL MEDICINE

## 2021-10-21 PROCEDURE — G8399 PT W/DXA RESULTS DOCUMENT: HCPCS | Performed by: INTERNAL MEDICINE

## 2021-10-21 PROCEDURE — 1123F ACP DISCUSS/DSCN MKR DOCD: CPT | Performed by: INTERNAL MEDICINE

## 2021-10-21 PROCEDURE — G8420 CALC BMI NORM PARAMETERS: HCPCS | Performed by: INTERNAL MEDICINE

## 2021-10-21 NOTE — PROGRESS NOTES
Chief Complaint   Patient presents with    New Patient       Last seen 04/20215  New Pt. Last seen 2015. Abnormal EKG, was done due to pre-surgery. For foot surgery    Last EKG 9-24-21    Denied sob, leg edema    Hx of occasional palpitation    Chest discomfort 3x/ week for the last 6 month  No aggravating or relieving factor  Last 10 to 15 sec  No associated symptoms    Sometimes dizziness when look up and moving around  Or turning    Denies  Edema    Some sob on exertion    Palpitation has for long time  Intermittent    Nonsmoker    FHX  Mother had afib and chf  Sister had mI at 64  Brother Valve replacement for RHD      Patient Active Problem List   Diagnosis    Hyperlipemia    PUD (peptic ulcer disease)    Osteopenia    Obstructive sleep apnea    Daytime somnolence    Restless sleeper    Fatigue    Difficulty sleeping    Morning headache    Hypertension    Arthritis    Seasonal allergies    Chest pain    COPD (chronic obstructive pulmonary disease) ([facility] [address]    Hypothyroidism    Chest pain, atypical    Intermittent palpitations    Dizziness for yrs on getting up- once in a while    GERD (gastroesophageal reflux disease)    Gait disturbance    Pre-op evaluation for orthopedic surgery       Past Surgical History:   Procedure Laterality Date    BUNIONECTOMY  1991    rt    COLONOSCOPY  2005    Due 2014 Dr Dorene Sneed    wisdom teeth extraction    HYSTERECTOMY  2001    KNEE ARTHROSCOPY  2004    lt meniscus repair     LAPAROSCOPY  1983    abd infection    SKIN CANCER EXCISION  07-17-13    Dr Fahad Swan       Allergies   Allergen Reactions    Amoxicillin Diarrhea    Darvon [Propoxyphene Hcl] Other (See Comments)     Feeling of extremity numbness.  Seasonal       cat, grass, dust, mold    Sudafed [Pseudoephedrine Hcl] Other (See Comments)     Restless feeling.         Family History   Problem Relation Age of Onset    High Blood Pressure Father     Other Father 76        abd anyuerism    Heart Disease Father         abdominal aortic aneurysm    Thyroid Disease Mother 43    Other Mother 21        psoriasis    Heart Failure Mother 76    Heart Disease Mother 79        A fib     Breast Cancer Mother 61    Heart Disease Brother 6        rheumatic fever as child     Heart Disease Maternal Uncle         unknown type of disease     Pancreatic Cancer Paternal Aunt     Cancer Maternal Grandmother         sarcoma hip    Heart Disease Sister 64        CAD, s/p triple bypass    Diabetes Paternal Grandmother     Breast Cancer Niece 29    BRCA 2 Positive Niece 29        POSITIVE    Pancreatic Cancer Paternal Aunt     Cancer Maternal Cousin         sarcoma of eye        Social History     Socioeconomic History    Marital status:      Spouse name: Not on file    Number of children: Not on file    Years of education: Not on file    Highest education level: Not on file   Occupational History    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[employer] [employer], graduated in 2015   Tobacco Use    Smoking status: Former Smoker     Packs/day: 0.50     Years: 0.50     Pack years: 0.25     Quit date: 1973     Years since quittin.2    Smokeless tobacco: Never Used   Vaping Use    Vaping Use: Never used   Substance and Sexual Activity    Alcohol use: Not Currently     Alcohol/week: 0.0 standard drinks    Drug use: No    Sexual activity: Not Currently     Partners: Male   Other Topics Concern    Not on file   Social History Narrative    Not on file     Social Determinants of Health     Financial Resource Strain:     Difficulty of Paying Living Expenses:    Food Insecurity: Unknown    Worried About Running Out of Food in the Last Year: Patient refused    [address] in the Last Year: Patient refused   Transportation Needs:     Lack of Transportation (Medical):      Lack of Transportation (Non-Medical):    Physical Activity:     Days of Exercise per Week:  Minutes of Exercise per Session:    Stress:     Feeling of Stress :    Social Connections:     Frequency of Communication with Friends and Family:     Frequency of Social Gatherings with Friends and Family:     Attends Hoahaoism Services:     Active Member of Clubs or Organizations:     Attends Club or Organization Meetings:     Marital Status:    Intimate Partner Violence:     Fear of Current or Ex-Partner:     Emotionally Abused:     Physically Abused:     Sexually Abused:        Current Outpatient Medications   Medication Sig Dispense Refill    Menaquinone-7 (VITAMIN K2 PO) Take 150 mcg by mouth Daily      zinc 50 MG CAPS Take by mouth      Selenium (SELENIMIN-200 PO) Take by mouth      MAGNESIUM PO Take 120 mg by mouth 2 times daily      Baclofen (LIORESAL) 5 MG tablet Take 1 tablet by mouth every evening 90 tablet 1    levothyroxine (SYNTHROID) 50 MCG tablet TAKE 1 TABLET DAILY 30 tablet 0    lisinopril (PRINIVIL;ZESTRIL) 10 MG tablet Take 1 tablet by mouth daily 90 tablet 1    fluticasone (FLONASE) 50 MCG/ACT nasal spray 2 sprays by Nasal route daily 1 Bottle 5    Omega-3 Fatty Acids (FISH OIL PO) Take by mouth      albuterol sulfate HFA (PROVENTIL HFA) 108 (90 Base) MCG/ACT inhaler Inhale 2 puffs into the lungs every 6 hours as needed for Wheezing 1 Inhaler 3    acetaminophen (TYLENOL) 325 MG tablet Take 650 mg by mouth every 6 hours as needed for Pain      Coenzyme Q10 (COQ-10 PO) Take 1 tablet by mouth daily       cyclobenzaprine (FLEXERIL) 5 MG tablet Take 1 tablet by mouth 3 times daily as needed (back pain) Rx  09 90 tablet 0    aspirin 81 MG tablet Take 81 mg by mouth daily      calcium carbonate (OSCAL) 500 MG TABS tablet Take 500 mg by mouth daily      Cholecalciferol (D3 VITAMIN PO) Take 1,000 Units by mouth daily       vitamin C (ASCORBIC ACID) 500 MG tablet Take 500 mg by mouth daily      Krill Oil 500 MG CAPS Take 1 tablet by mouth daily      Loperamide HCl (IMODIUM PO) Take  by mouth daily as needed. No current facility-administered medications for this visit. Review of Systems -     General ROS: negative  Psychological ROS: negative  Hematological and Lymphatic ROS: No history of blood clots or bleeding disorder. Respiratory ROS: no cough, shortness of breath, or wheezing  Cardiovascular ROS: no chest pain or dyspnea on exertion  Gastrointestinal ROS: negative  Genito-Urinary ROS: no dysuria, trouble voiding, or hematuria  Musculoskeletal ROS: negative  Neurological ROS: no TIA or stroke symptoms  Dermatological ROS: negative      Blood pressure (!) 146/91, pulse 75, height 5' 6\" (1.676 m), weight 145 lb 9.6 oz (66 kg). Physical Examination:    General appearance - alert, well appearing, and in no distress  Mental status - alert, oriented to person, place, and time  Neck - supple, no significant adenopathy, no JVD, or carotid bruits  Chest - clear to auscultation, no wheezes, rales or rhonchi, symmetric air entry  Heart - normal rate, regular rhythm, normal S1, S2, no murmurs, rubs, clicks or gallops  Abdomen - soft, nontender, nondistended, no masses or organomegaly  Neurological - alert, oriented, normal speech, no focal findings or movement disorder noted  Musculoskeletal - no joint tenderness, deformity or swelling  Extremities - peripheral pulses normal, no pedal edema, no clubbing or cyanosis  Skin - normal coloration and turgor, no rashes, no suspicious skin lesions noted    Lab  No results for input(s): CKTOTAL, CKMB, CKMBINDEX, TROPONINI in the last 72 hours.   CBC:   Lab Results   Component Value Date    WBC 6.7 09/24/2021    RBC 4.36 09/24/2021    HGB 13.5 09/24/2021    HCT 41.8 09/24/2021    MCV 95.9 09/24/2021    MCH 31.0 09/24/2021    MCHC 32.3 09/24/2021    RDW 13.3 01/11/2017     09/24/2021    MPV 9.0 09/24/2021     BMP:    Lab Results   Component Value Date     09/24/2021    K 3.7 09/24/2021     09/24/2021 CO2 31 2021    BUN 19 2021    LABALBU 4.3 2021    CREATININE 1.0 2021    CALCIUM 10.3 2021    LABGLOM 55 2021    GLUCOSE 90 2021     Hepatic Function Panel:    Lab Results   Component Value Date    ALKPHOS 113 2021    ALT 12 2021    AST 17 2021    PROT 7.0 2021    BILITOT 0.3 2021    BILIDIR <0.2 2021    LABALBU 4.3 2021     Magnesium:    Lab Results   Component Value Date    MG 2.0 10/13/2020     Warfarin PT/INR:    No components found for: PTPATWAR,  PTINRWAR  HgBA1c:    No results found for: LABA1C  FLP:    Lab Results   Component Value Date    TRIG 83 2019    HDL 67 2019    LDLCALC 57 2019     TSH:    Lab Results   Component Value Date    TSH 0.726 10/13/2020     EKG-1/13/15-Normal sinus rhythm  Normal ECG  When compared with ECG of 2008 01:12,  No significant change was found  Confirmed by Rebecca Arboleda ([id number]) on 2014 2:44:40 PM    [facility] EF 60% dec   Nuc stress test Okay dec 2017        CONCLUSION:  1. Sinus rhythm. 2. Occasional paroxysmal atrial contractions, rare premature ventricular  contractions. 3. No ventricular tachycardia, supraventricular tachycardia or pauses. Bean Madera M.D.  D: 2014 11:41     ekg 10/21/21    Normal sinus rhythm Nonspecific ST and T wave abnormality Abnormal ECG When compared with ECG of 26-DEC-2014 14:47, Nonspecific T wave abnormality is worse in Anterior leads QT has shortened    Assessment  FHX of CAD- sister had MI at 64 , brother had Valve surgery, and stent, Father AAA  from rupture     Diagnosis Orders   1. Pre-op evaluation for orthopedic surgery     2. Chest pain, atypical     3. Pure hypercholesterolemia     4. Primary hypertension     5. Dizziness for yrs on getting up- once in a while         Plan   Continue the current treatment and with constant vigilance to changes in symptoms and also any potential side effects.   Return for care

## 2021-11-03 ENCOUNTER — HOSPITAL ENCOUNTER (OUTPATIENT)
Dept: NON INVASIVE DIAGNOSTICS | Age: 71
Discharge: HOME | End: 2021-11-03
Payer: MEDICARE

## 2021-11-03 VITALS — BODY MASS INDEX: 22.44 KG/M2 | WEIGHT: 139 LBS

## 2021-11-03 DIAGNOSIS — R42 DIZZINESS: ICD-10-CM

## 2021-11-03 DIAGNOSIS — Z01.818 PRE-OP EVALUATION: ICD-10-CM

## 2021-11-03 DIAGNOSIS — E78.00 PURE HYPERCHOLESTEROLEMIA: ICD-10-CM

## 2021-11-03 DIAGNOSIS — R07.89 CHEST PAIN, ATYPICAL: ICD-10-CM

## 2021-11-03 DIAGNOSIS — I10 PRIMARY HYPERTENSION: ICD-10-CM

## 2021-11-03 LAB
LV EF: 60 %
LVEF MODALITY: NORMAL

## 2021-11-03 PROCEDURE — 6360000002 HC RX W HCPCS

## 2021-11-03 PROCEDURE — 93306 TTE W/DOPPLER COMPLETE: CPT

## 2021-11-03 PROCEDURE — 93226 XTRNL ECG REC<48 HR SCAN A/R: CPT

## 2021-11-03 PROCEDURE — A9500 TC99M SESTAMIBI: HCPCS | Performed by: INTERNAL MEDICINE

## 2021-11-03 PROCEDURE — 3430000000 HC RX DIAGNOSTIC RADIOPHARMACEUTICAL: Performed by: INTERNAL MEDICINE

## 2021-11-03 PROCEDURE — 93017 CV STRESS TEST TRACING ONLY: CPT | Performed by: INTERNAL MEDICINE

## 2021-11-03 PROCEDURE — 93225 XTRNL ECG REC<48 HRS REC: CPT

## 2021-11-03 PROCEDURE — 78452 HT MUSCLE IMAGE SPECT MULT: CPT

## 2021-11-03 RX ADMIN — Medication 30.3 MILLICURIE: at 11:48

## 2021-11-03 RX ADMIN — Medication 9 MILLICURIE: at 10:55

## 2021-11-03 NOTE — PROCEDURES
The skin was prepped and a holter monitor was applied. The patient was instructed on the documentation of symptoms and the purpose of the holter as well as the things to avoid while wearing the holter. The patient was instructed to remove and return the holter on 11/05/2021.   The serial number of the holter that was applied is [id number]

## 2021-11-04 ENCOUNTER — TELEPHONE (OUTPATIENT)
Dept: CARDIOLOGY CLINIC | Age: 71
End: 2021-11-04

## 2021-11-04 NOTE — TELEPHONE ENCOUNTER
Pt had stress and echo done 11-3-21.  48 holter placed 11-4-21. Can pt be cleared for Left Foot Surgery or do we need to wait for holter results? Form in Dr. Ignacio Delgadillo box.

## 2021-11-09 LAB
ACQUISITION DURATION: NORMAL S
AVERAGE HEART RATE: 78 BPM
HOOKUP DATE: NORMAL
HOOKUP TIME: NORMAL
MAX HEART RATE TIME/DATE: NORMAL
MAX HEART RATE: 129 BPM
MIN HEART RATE TIME/DATE: NORMAL
MIN HEART RATE: 62 BPM
NUMBER OF QRS COMPLEXES: NORMAL
NUMBER OF SUPRAVENTRICULAR COUPLETS: 5
NUMBER OF SUPRAVENTRICULAR ECTOPICS: 221
NUMBER OF SUPRAVENTRICULAR ISOLATED BEATS: 181
NUMBER OF VENTRICULAR BIGEMINAL CYCLES: 0
NUMBER OF VENTRICULAR COUPLETS: 0
NUMBER OF VENTRICULAR ECTOPICS: 5

## 2021-11-20 PROBLEM — Z01.818 PRE-OP EVALUATION: Status: RESOLVED | Noted: 2021-10-21 | Resolved: 2021-11-20

## 2021-12-13 ENCOUNTER — OFFICE VISIT (OUTPATIENT)
Dept: FAMILY MEDICINE CLINIC | Age: 71
End: 2021-12-13
Payer: MEDICARE

## 2021-12-13 VITALS
HEART RATE: 69 BPM | SYSTOLIC BLOOD PRESSURE: 125 MMHG | BODY MASS INDEX: 23.3 KG/M2 | DIASTOLIC BLOOD PRESSURE: 77 MMHG | HEIGHT: 66 IN | WEIGHT: 145 LBS

## 2021-12-13 DIAGNOSIS — M81.0 OSTEOPOROSIS, UNSPECIFIED OSTEOPOROSIS TYPE, UNSPECIFIED PATHOLOGICAL FRACTURE PRESENCE: ICD-10-CM

## 2021-12-13 DIAGNOSIS — I10 ESSENTIAL HYPERTENSION: Primary | ICD-10-CM

## 2021-12-13 DIAGNOSIS — Z86.69 HISTORY OF TRIGEMINAL NEURALGIA: ICD-10-CM

## 2021-12-13 DIAGNOSIS — E03.9 ACQUIRED HYPOTHYROIDISM: ICD-10-CM

## 2021-12-13 DIAGNOSIS — I67.9 SMALL VESSEL DISEASE, CEREBROVASCULAR: ICD-10-CM

## 2021-12-13 PROCEDURE — 3017F COLORECTAL CA SCREEN DOC REV: CPT | Performed by: FAMILY MEDICINE

## 2021-12-13 PROCEDURE — G8484 FLU IMMUNIZE NO ADMIN: HCPCS | Performed by: FAMILY MEDICINE

## 2021-12-13 PROCEDURE — G8399 PT W/DXA RESULTS DOCUMENT: HCPCS | Performed by: FAMILY MEDICINE

## 2021-12-13 PROCEDURE — G8427 DOCREV CUR MEDS BY ELIG CLIN: HCPCS | Performed by: FAMILY MEDICINE

## 2021-12-13 PROCEDURE — 1123F ACP DISCUSS/DSCN MKR DOCD: CPT | Performed by: FAMILY MEDICINE

## 2021-12-13 PROCEDURE — 99214 OFFICE O/P EST MOD 30 MIN: CPT | Performed by: FAMILY MEDICINE

## 2021-12-13 PROCEDURE — 1036F TOBACCO NON-USER: CPT | Performed by: FAMILY MEDICINE

## 2021-12-13 PROCEDURE — 1090F PRES/ABSN URINE INCON ASSESS: CPT | Performed by: FAMILY MEDICINE

## 2021-12-13 PROCEDURE — 4040F PNEUMOC VAC/ADMIN/RCVD: CPT | Performed by: FAMILY MEDICINE

## 2021-12-13 PROCEDURE — G8420 CALC BMI NORM PARAMETERS: HCPCS | Performed by: FAMILY MEDICINE

## 2021-12-13 RX ORDER — LISINOPRIL 20 MG/1
20 TABLET ORAL DAILY
Qty: 90 TABLET | Refills: 1 | Status: SHIPPED | OUTPATIENT
Start: 2021-12-13 | End: 2022-04-21 | Stop reason: SDUPTHER

## 2021-12-13 ASSESSMENT — SOCIAL DETERMINANTS OF HEALTH (SDOH): HOW HARD IS IT FOR YOU TO PAY FOR THE VERY BASICS LIKE FOOD, HOUSING, MEDICAL CARE, AND HEATING?: NOT HARD AT ALL

## 2021-12-13 NOTE — PROGRESS NOTES
[address]  [address] [place], [address]  Ph:   [phone]  Fax: [phone]    Provider:  Dr. Tiffanie Drake    Patient:  Rene Mcqueen  YOB: 1950      Visit Date:  12/13/2021     Reason For Visit:   Chief Complaint   Patient presents with    Follow-up     HTN        Alam Rosa Shea is a 73 y.o. 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 Blood pressure readings for the last several days has been 134/84, 119/76, 132/83, 132/81, 133/81, 140/ 91 and 142/84. Today the office was 125/77. Osteoporosis:    Diagnosed in August 2020. Her risk factors are age, white female, family history of osteoporosis in her sister. She had total hysterectomy at age 54 and took Premarin until age 66. She is taking calcium 1200 mg  a day and vitamin D 2000 international units daily. Hypothyroidism: She is taking Synthroid 50 mcg 1 tablet by mouth daily on empty stomach and by itself. TSH was checked October 13, 2020 and it was 0.726. Chronic microvascular ischemic angiopathy: Seen in the MRI done November 1, 2019. It showed no acute intracranial abnormality but senescent changes with interval increase in the amount of scattered, punctate foci of hyperintense T2 signal within the deep cerebral white matter. She have not been able to tolerate statins. She tried lipitor and Crestor    Trigeminal neuralgia:  Dr. Alfonso Daniel for it. Last time she had exacerbation was in July 2020 for which she took the third pill for a month. She did not follow up with Dr. Alfonso Daniel as she got better. Her last episode was January 2020 for which she took  left over Tegretol x 1 month and the symptoms resolved. Multilevel degenerative changes throughout the cervical spine.   Diagnosed with a MRI ordered by Dr. Mason Angeles.  She was referred to physical therapy. When she have acute exacerbation she restart the exercises. .      Significant Past Medical History:    Past Medical History:   Diagnosis Date    Allergic rhinitis     Arthritis     left thumb    GERD (gastroesophageal reflux disease)     HTN (hypertension) January 2013    Hyperlipemia 2004    Hypothyroid 2009    Levothyroxine    IBS (irritable bowel syndrome)     Knee cap dislocation     broke right knee    Metatarsal fracture     left    Multilevel degenerative disc disease 2009    Takes Ray Bologna off and on as needed    Osteoarthritis 1982    Osteopenia 2008    PUD (peptic ulcer disease) 2009    Recurrent low back pain 1992    disc disease           Allergies:  is allergic to amoxicillin, darvon [propoxyphene hcl], seasonal, and sudafed [pseudoephedrine hcl].     Medications:   Current Outpatient Medications   Medication Sig Dispense Refill    lisinopril (PRINIVIL;ZESTRIL) 20 MG tablet Take 1 tablet by mouth daily 90 tablet 1    Menaquinone-7 (VITAMIN K2 PO) Take 150 mcg by mouth Daily      zinc 50 MG CAPS Take by mouth      Selenium (SELENIMIN-200 PO) Take by mouth      MAGNESIUM PO Take 120 mg by mouth 2 times daily      Baclofen (LIORESAL) 5 MG tablet Take 1 tablet by mouth every evening 90 tablet 1    levothyroxine (SYNTHROID) 50 MCG tablet TAKE 1 TABLET DAILY 30 tablet 0    Omega-3 Fatty Acids (FISH OIL PO) Take by mouth      albuterol sulfate HFA (PROVENTIL HFA) 108 (90 Base) MCG/ACT inhaler Inhale 2 puffs into the lungs every 6 hours as needed for Wheezing 1 Inhaler 3    acetaminophen (TYLENOL) 325 MG tablet Take 650 mg by mouth every 6 hours as needed for Pain      Coenzyme Q10 (COQ-10 PO) Take 1 tablet by mouth daily       aspirin 81 MG tablet Take 81 mg by mouth daily      calcium carbonate (OSCAL) 500 MG TABS tablet Take 500 mg by mouth daily      Cholecalciferol (D3 VITAMIN PO) Take 1,000 Units by mouth daily  vitamin C (ASCORBIC ACID) 500 MG tablet Take 500 mg by mouth daily      Krill Oil 500 MG CAPS Take 1 tablet by mouth daily      Loperamide HCl (IMODIUM PO) Take  by mouth daily as needed.  fluticasone (FLONASE) 50 MCG/ACT nasal spray 2 sprays by Nasal route daily (Patient not taking: Reported on 12/13/2021) 1 Bottle 5     No current facility-administered medications for this visit. Review of systems:  Review of Systems - History obtained from chart review and the patient  General ROS: negative for - chills, fatigue or fever  ENT ROS: negative for - headaches, nasal congestion or nasal discharge  Allergy and Immunology ROS: negative for - seasonal allergies  Hematological and Lymphatic ROS: negative for - bruising  Endocrine ROS: negative for - malaise/lethargy, polydipsia/polyuria or temperature intolerance  Respiratory ROS: positive for - cough and  shortness of breath. No wheezing  Cardiovascular ROS: negative for - chest pain or edema  Gastrointestinal ROS: negative for - abdominal pain, constipation, diarrhea or nausea/vomiting  Musculoskeletal ROS: positive for - joint pain  Neurological ROS: negative for - dizziness  Dermatological ROS: negative for - rash    Physical Examination:  /77   Pulse 69   Ht 5' 6\" (1.676 m)   Wt 145 lb (65.8 kg)   BMI 23.40 kg/m²     General-  Alert and oriented x 3, NAD  HEENT: NC, AT, PERRLA, EOMI, anicteric sclerae  Ears: Normal tympanic membranes bilaterally  Nose: swollen, pale turbinates  Mouth: no lesions, moist mucosas  Neck - supple, no significant adenopathy  Chest - clear to auscultation, no wheezes, rales or rhonchi, symmetric air entry  Heart - normal rate, regular rhythm, normal S1, S2, no murmurs, rubs, clicks or gallops  Abdomen - soft, nontender, nondistended, no masses or organomegaly  Extremities -  no pedal edema, no clubbing or cyanosis    Impression:   Diagnosis Orders   1.  Essential hypertension  CBC Auto Differential Comprehensive Metabolic Panel    Urinalysis with Microscopic    Vitamin B12   2. Osteoporosis, unspecified osteoporosis type, unspecified pathological fracture presence     3. Small vessel disease, cerebrovascular     4. Acquired hypothyroidism  Lipid Panel    TSH without Reflex    T4, Free   5. History of trigeminal neuralgia         Plan:    Increase Lisinopril  To 2 mg 1 tablet by mouth daily. Check BP BID  Continue Synthroid 50 mcg 1 tablet by mouth daily. Do blood test  Restart physical therapy as prescribed for Osteoporosis    Orders Placed This Encounter   Procedures    Lipid Panel     Standing Status:   Future     Standing Expiration Date:   12/13/2022     Order Specific Question:   Is Patient Fasting?/# of Hours     Answer:   12 hours    CBC Auto Differential     Standing Status:   Future     Standing Expiration Date:   12/13/2022    Comprehensive Metabolic Panel     Standing Status:   Future     Standing Expiration Date:   12/13/2022    TSH without Reflex     Standing Status:   Future     Standing Expiration Date:   12/13/2022    Urinalysis with Microscopic     Standing Status:   Future     Standing Expiration Date:   12/13/2022     Order Specific Question:   SPECIFY(EX-CATH,MIDSTREAM,CYSTO,ETC)? Answer:   midstream    Vitamin B12     Standing Status:   Future     Standing Expiration Date:   12/13/2022    T4, Free     Standing Status:   Future     Standing Expiration Date:   12/13/2022     Orders Placed This Encounter   Medications    lisinopril (PRINIVIL;ZESTRIL) 20 MG tablet     Sig: Take 1 tablet by mouth daily     Dispense:  90 tablet     Refill:  1       Follow Up:  Return in about 4 months (around 4/13/2022).     Markus Overton MD

## 2021-12-14 ENCOUNTER — TELEPHONE (OUTPATIENT)
Dept: NEUROLOGY | Age: 71
End: 2021-12-14

## 2021-12-14 DIAGNOSIS — G50.0 TRIGEMINAL NEURALGIA: Primary | ICD-10-CM

## 2021-12-14 RX ORDER — CARBAMAZEPINE 100 MG/1
100 TABLET, EXTENDED RELEASE ORAL 2 TIMES DAILY
Qty: 60 TABLET | Refills: 3 | Status: SHIPPED | OUTPATIENT
Start: 2021-12-14

## 2021-12-14 NOTE — TELEPHONE ENCOUNTER
Patient called stating she is having flair up of right sided face pain, worse than in the past. She feels more that 1 branch of the nerve is affected this time as the pain is more widespread than in the past. She is running low on Tegretol  mg 2 times a day. She is asking for refill. Last script was on 7/8/2020. Last office visit was 7/15/2020. No follow up scheduled. Please advise. Thank you.

## 2021-12-17 ENCOUNTER — NURSE ONLY (OUTPATIENT)
Dept: LAB | Age: 71
End: 2021-12-17

## 2021-12-17 ENCOUNTER — OFFICE VISIT (OUTPATIENT)
Dept: NEUROLOGY | Age: 71
End: 2021-12-17
Payer: MEDICARE

## 2021-12-17 VITALS
HEART RATE: 68 BPM | BODY MASS INDEX: 23.11 KG/M2 | DIASTOLIC BLOOD PRESSURE: 78 MMHG | SYSTOLIC BLOOD PRESSURE: 124 MMHG | OXYGEN SATURATION: 97 % | TEMPERATURE: 97.6 F | WEIGHT: 143.8 LBS | HEIGHT: 66 IN

## 2021-12-17 DIAGNOSIS — E03.9 ACQUIRED HYPOTHYROIDISM: ICD-10-CM

## 2021-12-17 DIAGNOSIS — G50.0 TRIGEMINAL NEURALGIA: Primary | ICD-10-CM

## 2021-12-17 DIAGNOSIS — I10 ESSENTIAL HYPERTENSION: ICD-10-CM

## 2021-12-17 LAB
BACTERIA: ABNORMAL
BILIRUBIN URINE: NEGATIVE
BLOOD, URINE: NEGATIVE
CASTS: ABNORMAL /LPF
CASTS: ABNORMAL /LPF
CHARACTER, URINE: CLEAR
CHOLESTEROL, TOTAL: 256 MG/DL (ref 100–199)
COLOR: YELLOW
CRYSTALS: ABNORMAL
EPITHELIAL CELLS, UA: ABNORMAL /HPF
GLUCOSE, URINE: NEGATIVE MG/DL
HDLC SERPL-MCNC: 61 MG/DL
KETONES, URINE: NEGATIVE
LDL CHOLESTEROL CALCULATED: 161 MG/DL
LEUKOCYTE ESTERASE, URINE: ABNORMAL
MISCELLANEOUS LAB TEST RESULT: ABNORMAL
NITRITE, URINE: NEGATIVE
PH UA: 8.5 (ref 5–9)
PROTEIN UA: NEGATIVE MG/DL
RBC URINE: ABNORMAL /HPF
RENAL EPITHELIAL, UA: ABNORMAL
SPECIFIC GRAVITY UA: 1.02 (ref 1–1.03)
T4 FREE: 1.53 NG/DL (ref 0.93–1.76)
TRIGL SERPL-MCNC: 172 MG/DL (ref 0–199)
TSH SERPL DL<=0.05 MIU/L-ACNC: 1.28 UIU/ML (ref 0.4–4.2)
UROBILINOGEN, URINE: 0.2 EU/DL (ref 0–1)
VITAMIN B-12: 591 PG/ML (ref 211–911)
WBC UA: ABNORMAL /HPF
YEAST: ABNORMAL

## 2021-12-17 PROCEDURE — 1123F ACP DISCUSS/DSCN MKR DOCD: CPT | Performed by: PSYCHIATRY & NEUROLOGY

## 2021-12-17 PROCEDURE — 3017F COLORECTAL CA SCREEN DOC REV: CPT | Performed by: PSYCHIATRY & NEUROLOGY

## 2021-12-17 PROCEDURE — G8484 FLU IMMUNIZE NO ADMIN: HCPCS | Performed by: PSYCHIATRY & NEUROLOGY

## 2021-12-17 PROCEDURE — 1090F PRES/ABSN URINE INCON ASSESS: CPT | Performed by: PSYCHIATRY & NEUROLOGY

## 2021-12-17 PROCEDURE — 99213 OFFICE O/P EST LOW 20 MIN: CPT | Performed by: PSYCHIATRY & NEUROLOGY

## 2021-12-17 PROCEDURE — G8420 CALC BMI NORM PARAMETERS: HCPCS | Performed by: PSYCHIATRY & NEUROLOGY

## 2021-12-17 PROCEDURE — 4040F PNEUMOC VAC/ADMIN/RCVD: CPT | Performed by: PSYCHIATRY & NEUROLOGY

## 2021-12-17 PROCEDURE — 1036F TOBACCO NON-USER: CPT | Performed by: PSYCHIATRY & NEUROLOGY

## 2021-12-17 PROCEDURE — G8427 DOCREV CUR MEDS BY ELIG CLIN: HCPCS | Performed by: PSYCHIATRY & NEUROLOGY

## 2021-12-17 PROCEDURE — G8399 PT W/DXA RESULTS DOCUMENT: HCPCS | Performed by: PSYCHIATRY & NEUROLOGY

## 2021-12-17 NOTE — PATIENT INSTRUCTIONS
1. Continue with Tegretol 100 mg twice a day. Refills given. 2. Follow up in 6 months. 3. Call if any questions or concerns.

## 2021-12-22 ENCOUNTER — TELEPHONE (OUTPATIENT)
Dept: FAMILY MEDICINE CLINIC | Age: 71
End: 2021-12-22

## 2021-12-22 NOTE — TELEPHONE ENCOUNTER
Clem Roberts MD   12/20/2021  5:20 PM EST Back to Top        Lab work within acceptable ranges, except for the cholesterol.  She has not been able to tolerate satins in the past.  Try hard with diet and healthy lifestyle

## 2021-12-28 NOTE — PROGRESS NOTES
NEUROLOGY OUT PATIENT FOLLOW UP NOTE:  12/17/202111:06 AM    Chey Schofield is here for follow up for right facial pain. ROS:  Respiratory : no cough, no shortness of breath  Cardiac: no chest pain. No palpitations. Renal : no flank pain, no hematuria    Skin: no rash      Allergies   Allergen Reactions    Amoxicillin Diarrhea    Darvon [Propoxyphene Hcl] Other (See Comments)     Feeling of extremity numbness.  Seasonal       cat, grass, dust, mold    Sudafed [Pseudoephedrine Hcl] Other (See Comments)     Restless feeling.        Current Outpatient Medications:     carBAMazepine (TEGRETOL-XR) 100 MG extended release tablet, Take 1 tablet by mouth 2 times daily, Disp: 60 tablet, Rfl: 3    lisinopril (PRINIVIL;ZESTRIL) 20 MG tablet, Take 1 tablet by mouth daily, Disp: 90 tablet, Rfl: 1    Menaquinone-7 (VITAMIN K2 PO), Take 150 mcg by mouth Daily, Disp: , Rfl:     zinc 50 MG CAPS, Take by mouth, Disp: , Rfl:     Selenium (SELENIMIN-200 PO), Take by mouth, Disp: , Rfl:     MAGNESIUM PO, Take 120 mg by mouth 2 times daily, Disp: , Rfl:     Baclofen (LIORESAL) 5 MG tablet, Take 1 tablet by mouth every evening, Disp: 90 tablet, Rfl: 1    levothyroxine (SYNTHROID) 50 MCG tablet, TAKE 1 TABLET DAILY, Disp: 30 tablet, Rfl: 0    Omega-3 Fatty Acids (FISH OIL PO), Take by mouth, Disp: , Rfl:     albuterol sulfate HFA (PROVENTIL HFA) 108 (90 Base) MCG/ACT inhaler, Inhale 2 puffs into the lungs every 6 hours as needed for Wheezing, Disp: 1 Inhaler, Rfl: 3    acetaminophen (TYLENOL) 325 MG tablet, Take 650 mg by mouth every 6 hours as needed for Pain, Disp: , Rfl:     Coenzyme Q10 (COQ-10 PO), Take 1 tablet by mouth daily , Disp: , Rfl:     aspirin 81 MG tablet, Take 81 mg by mouth daily, Disp: , Rfl:     calcium carbonate (OSCAL) 500 MG TABS tablet, Take 500 mg by mouth daily, Disp: , Rfl:     Cholecalciferol (D3 VITAMIN PO), Take 1,000 Units by mouth daily , Disp: , Rfl:     vitamin C (ASCORBIC ACID) 500 MG tablet, Take 500 mg by mouth daily, Disp: , Rfl:     Krill Oil 500 MG CAPS, Take 1 tablet by mouth daily, Disp: , Rfl:     Loperamide HCl (IMODIUM PO), Take  by mouth daily as needed. , Disp: , Rfl:     fluticasone (FLONASE) 50 MCG/ACT nasal spray, 2 sprays by Nasal route daily (Patient not taking: Reported on 12/13/2021), Disp: 1 Bottle, Rfl: 5    I reviewed the past medical history, allergies, medications, social history and family history. PE:   Vitals:    12/17/21 1058   BP: 124/78   Site: Left Upper Arm   Position: Sitting   Cuff Size: Medium Adult   Pulse: 68   Temp: 97.6 °F (36.4 °C)   TempSrc: Skin   SpO2: 97%   Weight: 143 lb 12.8 oz (65.2 kg)   Height: 5' 6\" (1.676 m)     General Appearance:  awake, alert, oriented, in no acute distress  Gen: NAD, Language is Intact. Skin: no rash, lesion, dry  to touch. warm  Head: no rash, no icterus  Neck: There is no carotid bruits. The Neck is supple. There is no neck lymphadenopathy. Neuro: CN 2-12 grossly intact with no focal deficits. Power 5/5 Throughout symmetric, Reflexes are  symmetric. Long tracts are intact. Cerebellar exam is Intact. Sensory exam is intact to light touch. Gait is intact. Musculoskeletal:  Has no hand arthritis, no limitation of ROM in any of the four extremities. Lower extremities no edema  The abdomen is soft,  intact bowel sounds.          DATA:    Results for orders placed or performed during the hospital encounter of 11/03/21   Holter monitor 48 hour   Result Value Ref Range    Hookup Date [id number]     Hookup Time [id number]     Acquisition Duration [id number] S    Number Of QRS Complexes [id number]     Number Of Ventricular Ectopics 5     Number Of Ventricular Bigeminal Cycles 0     Number Of Ventricular Couplets 0     Number Of Superventricular Ectopics 221     Number Of Suptaventricular Isolated Beats 181     Number Of Supraventricular Couplets 5     Average Heart Rate 78 BPM    Max Heart Rate 129 BPM    Min Heart Rate 62 BPM Max Heart Rate Time/Date [id number]     Min Heart Rate Time/Date [id number]           Results for orders placed during the hospital encounter of 11/08/19    MRI Cervical Spine WO Contrast    Narrative  PROCEDURE: MRI CERVICAL SPINE WO CONTRAST    CLINICAL INFORMATION: Ataxic gait, Hyperreflexia. COMPARISON: None available. TECHNIQUE: Sagittal T1, T2 and STIR sequences were obtained through the cervical spine. Axial fast and echo and gradient echo T2-weighted images were obtained. FINDINGS:    The cervical spine is imaged from the craniocervical junction to the superior aspect of T3. No suspicious finding is identified at the cervical medullary junction. No abnormal signal or expansion is present within the cervical spinal cord. There is preservation of the expected cervical lordosis. Minimal anterolisthesis is present of C6 on C7 and C7 on T1. No acute fracture or suspicious marrow replacing lesion is identified. Mild degenerative facet arthropathy is present at every level. With regards to the disc spaces, at C2-C3, there is a disc bulge. The spinal canal is patent. The neural foramina are also patent. At C3-C4, there is a disc bulge. The spinal canal and neural foramina are patent. At C4-C5, there is a disc osteophyte complex. The spinal canal is patent. Uncovertebral joint spurring causes mild neural foraminal narrowing on the right without significant neural foraminal narrowing on the left. At C5-C6, there is a disc osteophyte complex causing mild spinal canal narrowing. The neural foramina are patent. At C6-C7, the spinal canal and neural foramina are patent. At C7-T1, the spinal canal and neural foramina are patent. No suspicious finding is identified within the visualized paraspinal soft tissues. Impression  Multilevel degenerative changes are present throughout the cervical spine and are further discussed by level in the findings.  These are most significant at C5-C6 where there is a disc osteophyte complex causing mild spinal canal narrowing. Degenerative  facet arthropathy and uncovertebral joint spurring is also present resulting in mild neural foraminal narrowing on the right at C4-C5. **This report has been created using voice recognition software. It may contain minor errors which are inherent in voice recognition technology. **    Final report electronically signed by Dr. Hong Lira on 11/8/2019 10:16 AM    No results found for this or any previous visit. No results found for this or any previous visit. No results found for this or any previous visit. Results for orders placed during the hospital encounter of 11/01/19    MRI BRAIN WO CONTRAST    Narrative  PROCEDURE: MRI BRAIN WO CONTRAST    CLINICAL INFORMATION Ataxia. COMPARISON: MRI of the brain dated July 21, 2008. TECHNIQUE: Multiplanar and multiple spin echo MRI images were obtained of the brain without contrast.    FINDINGS:    There is mild prominence of the sulcal spaces and ventricular system secondary to generalized, age-related parenchymal volume loss. Mild, scattered punctate foci of hyperintense T2 signal are present throughout the deep cerebral white matter. These  appear more numerous compared to the prior exam. No restricted diffusion is identified within the brain. No abnormal susceptibility is present. The ventricles are similar in size and morphology without evidence of hydrocephalus. No mass, mass effect or extra-axial fluid collection is identified. The basal cisterns and visualized vascular flow voids are patent. The pituitary, brain stem and  cervical medullary junction are within normal limits. The visualized orbits and temporal bone structures are unremarkable. The paranasal sinuses are within normal limits. Impression  No acute intracranial abnormality is identified.  Senescent changes are present and there has been interval increase in the amount of scattered, punctate foci of hyperintense T2 signal within the deep cerebral white matter. These likely correspond to  mild sequela of chronic microvascular ischemic angiopathy. **This report has been created using voice recognition software. It may contain minor errors which are inherent in voice recognition technology. **    Final report electronically signed by Dr. Shahbaz Rodgers on 11/1/2019 4:22 PM           Assessment:     Diagnosis Orders   1. Trigeminal neuralgia          Follow up for right trigeminal neuralgia, she was last seen 7/2020. Patient called in stating her facial pain had become worse in the past 1 week. She had been off of her tegretol for her trigeminal neuralgia as she was doing well. She restarted her tegretol and her facial pain is better controlled. She is pleased with how she is doing. After detailed discussion with patient we agreed on the following plan. Plan:  1. Continue with Tegretol 100 mg twice a day. Refills given. 2. Follow up in 6 months. 3. Call if any questions or concerns.       Total time 24 min    Michael Astudillo MD

## 2022-01-08 ENCOUNTER — PATIENT MESSAGE (OUTPATIENT)
Dept: FAMILY MEDICINE CLINIC | Age: 72
End: 2022-01-08

## 2022-01-10 RX ORDER — LEVOTHYROXINE SODIUM 0.05 MG/1
TABLET ORAL
Qty: 30 TABLET | Refills: 0 | Status: SHIPPED | OUTPATIENT
Start: 2022-01-10 | End: 2022-01-24 | Stop reason: SDUPTHER

## 2022-01-10 NOTE — TELEPHONE ENCOUNTER
From: Katharine Soliz  To: Dr. Thompson [place]: 1/8/2022 3:49 PM EST  Subject: Synthroid prescription    I have no more refills available on my current Synthroid prescription. Can you please send a new script and make certain it is filled using Synthroid, and not a generic, as I respond poorly to the generics.   Thank you

## 2022-01-13 ENCOUNTER — PATIENT MESSAGE (OUTPATIENT)
Dept: FAMILY MEDICINE CLINIC | Age: 72
End: 2022-01-13

## 2022-01-14 NOTE — TELEPHONE ENCOUNTER
From: Josh Alfaro  To: Dr. Jcarlos Mendez: 1/13/2022 9:51 PM EST  Subject: Prescription Question    Please send a refill for my Synthroid to [facility] by mail. Please make certain that it is Synthroid and not a generic, as I respond better to the Synthroid. Thank you.

## 2022-01-24 RX ORDER — LEVOTHYROXINE SODIUM 0.05 MG/1
TABLET ORAL
Qty: 90 TABLET | Refills: 1 | Status: SHIPPED | OUTPATIENT
Start: 2022-01-24 | End: 2022-01-24

## 2022-01-24 RX ORDER — LEVOTHYROXINE SODIUM 0.05 MG/1
TABLET ORAL
Qty: 30 TABLET | Refills: 1 | Status: SHIPPED | OUTPATIENT
Start: 2022-01-24 | End: 2022-01-24

## 2022-01-24 RX ORDER — LEVOTHYROXINE SODIUM 0.05 MG/1
TABLET ORAL
Qty: 90 TABLET | Refills: 1 | Status: SHIPPED | OUTPATIENT
Start: 2022-01-24 | End: 2022-08-08 | Stop reason: CLARIF

## 2022-01-24 NOTE — TELEPHONE ENCOUNTER
Please approve or deny     Last Visit Date:  12/13/2021       Next Visit Date:    4/13/2022       Patient called because her mail in pharmacy didn't send her brand name as listed on the prescription. She also says its cheaper to get a 90 supply. New prescription sent.

## 2022-02-09 ENCOUNTER — PATIENT MESSAGE (OUTPATIENT)
Dept: FAMILY MEDICINE CLINIC | Age: 72
End: 2022-02-09

## 2022-02-09 RX ORDER — BACLOFEN 5 MG/1
5 TABLET ORAL EVERY EVENING
Qty: 90 TABLET | Refills: 1 | Status: SHIPPED | OUTPATIENT
Start: 2022-02-09 | End: 2022-11-01 | Stop reason: SDUPTHER

## 2022-02-09 NOTE — TELEPHONE ENCOUNTER
From: Kaylie Drivers  To: Dr. Gasca [place]: 2/9/2022 9:44 AM EST  Subject: Prescription needed    [facility] is unable to fill my refill for Baclofen 5 mg due to it being backordered indefinitely. They have advised me to get a new prescription and have it filled locally. I have verified the [facility] is able to fill a 90 day script. Can you please send a script to the [address] [place] [address] for me? Thank you!

## 2022-02-21 ENCOUNTER — OFFICE VISIT (OUTPATIENT)
Dept: CARDIOLOGY CLINIC | Age: 72
End: 2022-02-21
Payer: MEDICARE

## 2022-02-21 VITALS
HEIGHT: 66 IN | SYSTOLIC BLOOD PRESSURE: 141 MMHG | HEART RATE: 86 BPM | BODY MASS INDEX: 23.95 KG/M2 | WEIGHT: 149 LBS | DIASTOLIC BLOOD PRESSURE: 81 MMHG

## 2022-02-21 DIAGNOSIS — K21.9 GASTROESOPHAGEAL REFLUX DISEASE, UNSPECIFIED WHETHER ESOPHAGITIS PRESENT: ICD-10-CM

## 2022-02-21 DIAGNOSIS — R00.2 INTERMITTENT PALPITATIONS: ICD-10-CM

## 2022-02-21 DIAGNOSIS — I10 PRIMARY HYPERTENSION: Primary | ICD-10-CM

## 2022-02-21 DIAGNOSIS — E78.00 PURE HYPERCHOLESTEROLEMIA: ICD-10-CM

## 2022-02-21 PROCEDURE — 3017F COLORECTAL CA SCREEN DOC REV: CPT | Performed by: INTERNAL MEDICINE

## 2022-02-21 PROCEDURE — G8484 FLU IMMUNIZE NO ADMIN: HCPCS | Performed by: INTERNAL MEDICINE

## 2022-02-21 PROCEDURE — 1036F TOBACCO NON-USER: CPT | Performed by: INTERNAL MEDICINE

## 2022-02-21 PROCEDURE — 1090F PRES/ABSN URINE INCON ASSESS: CPT | Performed by: INTERNAL MEDICINE

## 2022-02-21 PROCEDURE — G8399 PT W/DXA RESULTS DOCUMENT: HCPCS | Performed by: INTERNAL MEDICINE

## 2022-02-21 PROCEDURE — G8420 CALC BMI NORM PARAMETERS: HCPCS | Performed by: INTERNAL MEDICINE

## 2022-02-21 PROCEDURE — 1123F ACP DISCUSS/DSCN MKR DOCD: CPT | Performed by: INTERNAL MEDICINE

## 2022-02-21 PROCEDURE — 4040F PNEUMOC VAC/ADMIN/RCVD: CPT | Performed by: INTERNAL MEDICINE

## 2022-02-21 PROCEDURE — G8427 DOCREV CUR MEDS BY ELIG CLIN: HCPCS | Performed by: INTERNAL MEDICINE

## 2022-02-21 PROCEDURE — 99214 OFFICE O/P EST MOD 30 MIN: CPT | Performed by: INTERNAL MEDICINE

## 2022-02-21 NOTE — PROGRESS NOTES
Chief Complaint   Patient presents with    Follow-up       Nesha Fuentes Last seen 04/20215 and came back in oc 2021 for Abnormal EKG, was done due to pre-surgery. For foot surgery      4 month fu    ekg 9-24-21    Denied chest pain, sob, leg edema    Hx of occasional palpitation    No chest pain of long while    Sometimes dizziness when look up and moving around  Or turning    Denies  Edema    Some sob on exertion    Palpitation has for long time  Intermittent    Nonsmoker    FHX  Mother had afib and chf  Sister had mI at 64  Brother Valve replacement for RHD      Patient Active Problem List   Diagnosis    Hyperlipemia    PUD (peptic ulcer disease)    Osteopenia    Obstructive sleep apnea    Daytime somnolence    Restless sleeper    Fatigue    Difficulty sleeping    Morning headache    Hypertension    Arthritis    Seasonal allergies    Chest pain    COPD (chronic obstructive pulmonary disease) ([address]    Hypothyroidism    Chest pain, atypical    Intermittent palpitations    Dizziness for yrs on getting up- once in a while    GERD (gastroesophageal reflux disease)    Gait disturbance       Past Surgical History:   Procedure Laterality Date    BUNIONECTOMY  1991    rt    COLONOSCOPY  2005    Due 2014 Dr Jackie Love    wisdom teeth extraction    HYSTERECTOMY  2001    KNEE ARTHROSCOPY  2004    lt meniscus repair     LAPAROSCOPY  1983    abd infection    SKIN CANCER EXCISION  07-17-13    Dr Bassem Donald       Allergies   Allergen Reactions    Amoxicillin Diarrhea    Darvon [Propoxyphene Hcl] Other (See Comments)     Feeling of extremity numbness.  Seasonal       cat, grass, dust, mold    Sudafed [Pseudoephedrine Hcl] Other (See Comments)     Restless feeling.         Family History   Problem Relation Age of Onset    High Blood Pressure Father     Other Father 76        abd anyuerism    Heart Disease Father         abdominal aortic aneurysm    Thyroid Disease Mother 43    Other Mother 21        psoriasis    Heart Failure Mother 76    Heart Disease Mother 79        A fib     Breast Cancer Mother 61    Heart Disease Brother 6        rheumatic fever as child     Heart Disease Maternal Uncle         unknown type of disease     Pancreatic Cancer Paternal Aunt     Cancer Maternal Grandmother         sarcoma hip    Heart Disease Sister 64        CAD, s/p triple bypass    Diabetes Paternal Grandmother     Breast Cancer Niece 29    BRCA 2 Positive Niece 29        POSITIVE    Pancreatic Cancer Paternal Aunt     Cancer Maternal Cousin         sarcoma of eye        Social History     Socioeconomic History    Marital status:      Spouse name: Not on file    Number of children: Not on file    Years of education: Not on file    Highest education level: Not on file   Occupational History    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[employer], graduated in    Tobacco Use    Smoking status: Former Smoker     Packs/day: 0.50     Years: 0.50     Pack years: 0.25     Quit date: 1973     Years since quittin.5    Smokeless tobacco: Never Used   Vaping Use    Vaping Use: Never used   Substance and Sexual Activity    Alcohol use: Not Currently     Alcohol/week: 0.0 standard drinks    Drug use: No    Sexual activity: Not Currently     Partners: Male   Other Topics Concern    Not on file   Social History Narrative    Not on file     Social Determinants of Health     Financial Resource Strain: Low Risk     Difficulty of Paying Living Expenses: Not hard at all   Food Insecurity: Unknown    Worried About [address] in the Last Year: Patient refused    [address] in the Last Year: Patient refused   Transportation Needs:     Lack of Transportation (Medical): Not on file    Lack of Transportation (Non-Medical):  Not on file   Physical Activity:     Days of Exercise per Week: Not on file    Minutes of Exercise per Session: Not on file Stress:     Feeling of Stress : Not on file   Social Connections:     Frequency of Communication with Friends and Family: Not on file    Frequency of Social Gatherings with Friends and Family: Not on file    Attends Congregational Services: Not on file    Active Member of [address] [place] [address] or Organizations: Not on file    Attends Club or Organization Meetings: Not on file    Marital Status: Not on file   Intimate Partner Violence:     Fear of Current or Ex-Partner: Not on file    Emotionally Abused: Not on file    Physically Abused: Not on file    Sexually Abused: Not on file   Housing Stability:     Unable to Pay for Housing in the Last Year: Not on file    Number of Zohreh in the Last Year: Not on file    Unstable Housing in the Last Year: Not on file       Current Outpatient Medications   Medication Sig Dispense Refill    FIBER PO Take by mouth      Baclofen (LIORESAL) 5 MG tablet Take 1 tablet by mouth every evening 90 tablet 1    levothyroxine (SYNTHROID) 50 MCG tablet TAKE 1 TABLET DAILY 90 tablet 1    carBAMazepine (TEGRETOL-XR) 100 MG extended release tablet Take 1 tablet by mouth 2 times daily (Patient taking differently: Take 100 mg by mouth 2 times daily as needed ) 60 tablet 3    lisinopril (PRINIVIL;ZESTRIL) 20 MG tablet Take 1 tablet by mouth daily 90 tablet 1    Menaquinone-7 (VITAMIN K2 PO) Take 150 mcg by mouth Daily      zinc 50 MG CAPS Take by mouth      Selenium (SELENIMIN-200 PO) Take by mouth      MAGNESIUM PO Take 120 mg by mouth 2 times daily      fluticasone (FLONASE) 50 MCG/ACT nasal spray 2 sprays by Nasal route daily (Patient taking differently: 2 sprays by Nasal route daily as needed ) 1 Bottle 5    Omega-3 Fatty Acids (FISH OIL PO) Take by mouth      albuterol sulfate HFA (PROVENTIL HFA) 108 (90 Base) MCG/ACT inhaler Inhale 2 puffs into the lungs every 6 hours as needed for Wheezing 1 Inhaler 3    acetaminophen (TYLENOL) 325 MG tablet Take 650 mg by mouth every 6 hours as needed for Pain      Coenzyme Q10 (COQ-10 PO) Take 1 tablet by mouth daily       aspirin 81 MG tablet Take 81 mg by mouth daily      calcium carbonate (OSCAL) 500 MG TABS tablet Take 500 mg by mouth daily      Cholecalciferol (D3 VITAMIN PO) Take 1,000 Units by mouth daily       vitamin C (ASCORBIC ACID) 500 MG tablet Take 500 mg by mouth daily      Krill Oil 500 MG CAPS Take 1 tablet by mouth daily      Loperamide HCl (IMODIUM PO) Take  by mouth daily as needed. No current facility-administered medications for this visit. Review of Systems -     General ROS: negative  Psychological ROS: negative  Hematological and Lymphatic ROS: No history of blood clots or bleeding disorder. Respiratory ROS: no cough, shortness of breath, or wheezing  Cardiovascular ROS: no chest pain or dyspnea on exertion  Gastrointestinal ROS: negative  Genito-Urinary ROS: no dysuria, trouble voiding, or hematuria  Musculoskeletal ROS: negative  Neurological ROS: no TIA or stroke symptoms  Dermatological ROS: negative      Blood pressure (!) 141/81, pulse 86, height 5' 6\" (1.676 m), weight 149 lb (67.6 kg).         Physical Examination:    General appearance - alert, well appearing, and in no distress  Mental status - alert, oriented to person, place, and time  Neck - supple, no significant adenopathy, no JVD, or carotid bruits  Chest - clear to auscultation, no wheezes, rales or rhonchi, symmetric air entry  Heart - normal rate, regular rhythm, normal S1, S2, no murmurs, rubs, clicks or gallops  Abdomen - soft, nontender, nondistended, no masses or organomegaly  Neurological - alert, oriented, normal speech, no focal findings or movement disorder noted  Musculoskeletal - no joint tenderness, deformity or swelling  Extremities - peripheral pulses normal, no pedal edema, no clubbing or cyanosis  Skin - normal coloration and turgor, no rashes, no suspicious skin lesions noted    Lab  No results for input(s): CKTOTAL, CKMB, physician) on 2021 at 11:45 AM   ----------------------------------------------------------------       Conclusions    Summary   Lexiscan EKG stress test is not suggestive for ischemia. Nonspecific ST-T wave changes. This Nuclear Medicine study was negative for ischemia . Signatures      ----------------------------------------------------------------   Electronically signed by Peterson Garcia MD (Interpreting   Cardiologist) on 2021 at 18:03   ----------------------------------------------------------------      DIARY * Symptom Correlates with  Normal sinus rhythm and rate     CONCLUSION:  *     Normal Sinus rhythm    Average Heart Rate 78 BPM Range from  62 bpm to  129 bpm Rare Premature atrial complexes -181 pacs  Rare supraventricular ectopic runs/atrial runs/ longest 5 beats at rate 139 bpm and fastest was 4 beats at rate 173 bpm  Rare Premature ventricular complexes -2  No long pause or profound bradycardia  No Supraventricular Tachycardia   No Atrial Fibrillation       Confirmed by Denice Omalley MD, [facility] ([id number]) on 2021 5:30:59 PM      CONCLUSION:  1. Sinus rhythm. 2. Occasional paroxysmal atrial contractions, rare premature ventricular  contractions. 3. No ventricular tachycardia, supraventricular tachycardia or pauses. Shawn Ortega M.D.  D: 2014 11:41     ekg 10/21/21  Normal sinus rhythm Nonspecific ST and T wave abnormality Abnormal ECG When compared with ECG of 26-DEC-2014 14:47, Nonspecific T wave abnormality is worse in Anterior leads QT has shortened    Assessment  FHX of CAD- sister had MI at 64 , brother had Valve surgery, and stent, Father AAA  from rupture     Diagnosis Orders   1. Primary hypertension     2. Pure hypercholesterolemia     3. Gastroesophageal reflux disease, unspecified whether esophagitis present     4.  Intermittent palpitations         Plan   meds and labs reviewed    Continue the current treatment and with constant vigilance to changes in symptoms and also any potential side effects. Return for care or seek medical attention immediately if symptoms got worse and/or develop new symptoms. Hypertension, on medical treatment. Seems to be under good control. Patient is compliant with medical treatment. Hyperlipidemia: on med rx and LSM  Could not tolerate statin  On diet and exercise    Hx of Dizziness- nonspecific  Bedside ortho negative  Cont asa 81  Echo and Darci nuc- WNL  Holter 48 hrs- WNL    D/w the pat the plan of  A nd finding of the test    Discussed use, benefit, and side effects of prescribed medications. All patient questions answered. Pt voiced understanding. Instructed to continue current medications, diet and exercise. Continue risk factor modification and medical management. Patient agreed with treatment plan. Follow up as directed.       RTC in 12  months      Yordan Rene, [facility]

## 2022-03-28 ENCOUNTER — OFFICE VISIT (OUTPATIENT)
Dept: RHEUMATOLOGY | Age: 72
End: 2022-03-28
Payer: MEDICARE

## 2022-03-28 VITALS
OXYGEN SATURATION: 98 % | HEART RATE: 76 BPM | WEIGHT: 144 LBS | DIASTOLIC BLOOD PRESSURE: 76 MMHG | HEIGHT: 66 IN | BODY MASS INDEX: 23.14 KG/M2 | SYSTOLIC BLOOD PRESSURE: 128 MMHG

## 2022-03-28 DIAGNOSIS — M81.0 AGE-RELATED OSTEOPOROSIS WITHOUT CURRENT PATHOLOGICAL FRACTURE: Primary | ICD-10-CM

## 2022-03-28 PROCEDURE — G8427 DOCREV CUR MEDS BY ELIG CLIN: HCPCS | Performed by: NURSE PRACTITIONER

## 2022-03-28 PROCEDURE — G8420 CALC BMI NORM PARAMETERS: HCPCS | Performed by: NURSE PRACTITIONER

## 2022-03-28 PROCEDURE — 4040F PNEUMOC VAC/ADMIN/RCVD: CPT | Performed by: NURSE PRACTITIONER

## 2022-03-28 PROCEDURE — G8484 FLU IMMUNIZE NO ADMIN: HCPCS | Performed by: NURSE PRACTITIONER

## 2022-03-28 PROCEDURE — 1036F TOBACCO NON-USER: CPT | Performed by: NURSE PRACTITIONER

## 2022-03-28 PROCEDURE — 1090F PRES/ABSN URINE INCON ASSESS: CPT | Performed by: NURSE PRACTITIONER

## 2022-03-28 PROCEDURE — G8399 PT W/DXA RESULTS DOCUMENT: HCPCS | Performed by: NURSE PRACTITIONER

## 2022-03-28 PROCEDURE — 99213 OFFICE O/P EST LOW 20 MIN: CPT | Performed by: NURSE PRACTITIONER

## 2022-03-28 PROCEDURE — 1123F ACP DISCUSS/DSCN MKR DOCD: CPT | Performed by: NURSE PRACTITIONER

## 2022-03-28 PROCEDURE — 3017F COLORECTAL CA SCREEN DOC REV: CPT | Performed by: NURSE PRACTITIONER

## 2022-03-28 ASSESSMENT — ENCOUNTER SYMPTOMS
CONSTIPATION: 0
NAUSEA: 0
COUGH: 0
SHORTNESS OF BREATH: 0
ABDOMINAL PAIN: 0
EYE PAIN: 0
EYE ITCHING: 0
DIARRHEA: 0
TROUBLE SWALLOWING: 0
BACK PAIN: 0

## 2022-03-28 NOTE — PROGRESS NOTES
[facility]  Bone Fragility Follow up     Visit Date: 3/28/2022  MRN: [id number]  Cc:   Chief Complaint   Patient presents with    Follow-up     1 year follow up         HPI:   Rosana Holm  is a(n)72 y.o. female here today for follow up of Osteoporosis. Had fall last summer when she stepped out onto step and fell. Had small foot fracture. Denies any other falls. Continues to take the calcium and vitamin D supplements. ROS:  Review of Systems   Constitutional: Negative for fatigue, fever and unexpected weight change. HENT: Negative for congestion and trouble swallowing. Eyes: Negative for pain and itching. Respiratory: Negative for cough and shortness of breath. Cardiovascular: Negative for chest pain and leg swelling. Gastrointestinal: Negative for abdominal pain, constipation, diarrhea and nausea. Endocrine: Negative for cold intolerance and heat intolerance. Genitourinary: Negative for difficulty urinating, frequency and urgency. Musculoskeletal: Negative for arthralgias, back pain and joint swelling. Skin: Negative for rash. Neurological: Negative for dizziness, weakness, numbness and headaches. Psychiatric/Behavioral: The patient is not nervous/anxious.           PAST MEDICAL HISTORY  Past Medical History:   Diagnosis Date    Allergic rhinitis     Arthritis     left thumb    GERD (gastroesophageal reflux disease)     HTN (hypertension) January 2013    Hyperlipemia 2004    Hypothyroid 2009    Levothyroxine    IBS (irritable bowel syndrome)     Knee cap dislocation     broke right knee    Metatarsal fracture     left    Multilevel degenerative disc disease 2009    Takes Leellen Roughen off and on as needed    Osteoarthritis 1982    Osteopenia 2008    PUD (peptic ulcer disease) 2009    Recurrent low back pain 1992    disc disease       SOCIAL HISTORY  Social History     Socioeconomic History    Marital status:      Spouse name: None    Number of children: None    Years of education: None    Highest education level: None   Occupational History    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[employer], graduated in 2015   Tobacco Use    Smoking status: Former Smoker     Packs/day: 0.50     Years: 0.50     Pack years: 0.25     Quit date: 1973     Years since quittin.6    Smokeless tobacco: Never Used   Vaping Use    Vaping Use: Never used   Substance and Sexual Activity    Alcohol use: Not Currently     Alcohol/week: 0.0 standard drinks    Drug use: No    Sexual activity: Not Currently     Partners: Male   Other Topics Concern    None   Social History Narrative    None     Social Determinants of Health     Financial Resource Strain: Low Risk     Difficulty of Paying Living Expenses: Not hard at all   Food Insecurity: Unknown    Worried About [address] in the Last Year: Patient refused   [address] in the Last Year: Patient refused   Transportation Needs:     Lack of Transportation (Medical): Not on file    Lack of Transportation (Non-Medical):  Not on file   Physical Activity:     Days of Exercise per Week: Not on file    Minutes of Exercise per Session: Not on file   Stress:     Feeling of Stress : Not on file   Social Connections:     Frequency of Communication with Friends and Family: Not on file    Frequency of Social Gatherings with Friends and Family: Not on file    Attends Holiness Services: Not on file    Active Member of [address] [place] [address] or Organizations: Not on file    Attends Club or Organization Meetings: Not on file    Marital Status: Not on file   Intimate Partner Violence:     Fear of Current or Ex-Partner: Not on file    Emotionally Abused: Not on file    Physically Abused: Not on file    Sexually Abused: Not on file   Housing Stability:     Unable to Pay for Housing in the Last Year: Not on file    Number of [place] in the Last Year: Not on file    Unstable Housing in the Last Year: Not on file FAMILY HISTORY  Family History   Problem Relation Age of Onset    High Blood Pressure Father     Other Father 76        abd anyuerism    Heart Disease Father         abdominal aortic aneurysm    Thyroid Disease Mother 43    Other Mother 21        psoriasis    Heart Failure Mother 76    Heart Disease Mother 79        A fib     Breast Cancer Mother 61    Heart Disease Brother 6        rheumatic fever as child     Heart Disease Maternal Uncle         unknown type of disease     Pancreatic Cancer Paternal Aunt     Cancer Maternal Grandmother         sarcoma hip    Heart Disease Sister 64        CAD, s/p triple bypass    Diabetes Paternal Grandmother     Breast Cancer Niece 29    BRCA 2 Positive Niece 29        POSITIVE    Pancreatic Cancer Paternal Aunt     Cancer Maternal Cousin         sarcoma of eye       SURGICAL HISTORY  Past Surgical History:   Procedure Laterality Date    BUNIONECTOMY  1991    rt    COLONOSCOPY  2005    Due 2014 Dr Poe    wisdom teeth extraction    HYSTERECTOMY  2001    KNEE ARTHROSCOPY  2004    lt meniscus repair     LAPAROSCOPY  1983    abd infection    SKIN CANCER EXCISION  07-17-13    Dr Korina Grover  Allergies   Allergen Reactions    Amoxicillin Diarrhea    Darvon [Propoxyphene Hcl] Other (See Comments)     Feeling of extremity numbness.  Seasonal       cat, grass, dust, mold    Sudafed [Pseudoephedrine Hcl] Other (See Comments)     Restless feeling.        CURRENTMEDICATIONS  Current Outpatient Medications   Medication Sig Dispense Refill    FIBER PO Take by mouth      Baclofen (LIORESAL) 5 MG tablet Take 1 tablet by mouth every evening 90 tablet 1    levothyroxine (SYNTHROID) 50 MCG tablet TAKE 1 TABLET DAILY 90 tablet 1    carBAMazepine (TEGRETOL-XR) 100 MG extended release tablet Take 1 tablet by mouth 2 times daily (Patient taking differently: Take 100 mg by mouth 2 times daily as needed ) 60 tablet 3    lisinopril (PRINIVIL;ZESTRIL) 20 MG tablet Take 1 tablet by mouth daily 90 tablet 1    Menaquinone-7 (VITAMIN K2 PO) Take 150 mcg by mouth Daily      zinc 50 MG CAPS Take by mouth      Selenium (SELENIMIN-200 PO) Take by mouth      MAGNESIUM PO Take 120 mg by mouth 2 times daily      Omega-3 Fatty Acids (FISH OIL PO) Take by mouth      albuterol sulfate HFA (PROVENTIL HFA) 108 (90 Base) MCG/ACT inhaler Inhale 2 puffs into the lungs every 6 hours as needed for Wheezing 1 Inhaler 3    acetaminophen (TYLENOL) 325 MG tablet Take 650 mg by mouth every 6 hours as needed for Pain      Coenzyme Q10 (COQ-10 PO) Take 1 tablet by mouth daily       aspirin 81 MG tablet Take 81 mg by mouth daily      calcium carbonate (OSCAL) 500 MG TABS tablet Take 500 mg by mouth daily      Cholecalciferol (D3 VITAMIN PO) Take 1,000 Units by mouth daily       vitamin C (ASCORBIC ACID) 500 MG tablet Take 500 mg by mouth daily      Krill Oil 500 MG CAPS Take 1 tablet by mouth daily      Loperamide HCl (IMODIUM PO) Take  by mouth daily as needed.  fluticasone (FLONASE) 50 MCG/ACT nasal spray 2 sprays by Nasal route daily (Patient taking differently: 2 sprays by Nasal route daily as needed ) 1 Bottle 5     No current facility-administered medications for this visit. Objective: There were no vitals taken for this visit. General: No distress. Alert. Eyes: No conjunctival injection. Resp: No accessory muscle use. Lung sounds clear. CV: Regular rate. Regularrhythm. No mumur or rub. No edema. M/S:   Upper extremities:  Muscle strength 5/5    Lower Extremities:   Muscle strength 5/5    Neuro: Oriented to person, place, time  Psych:  No anxiety or agitation. Skin: Warm and dry. No rash on exposed extremities.        Labs:  CBC  Lab Results   Component Value Date    WBC 6.7 09/24/2021    RBC 4.36 09/24/2021    HGB 13.5 09/24/2021    HCT 41.8 09/24/2021    MCV 95.9 09/24/2021    MCH 31.0 09/24/2021    MCHC 32.3 09/24/2021    RDW 13.3 01/11/2017     09/24/2021       CMP  Lab Results   Component Value Date    CALCIUM 10.3 09/24/2021    LABALBU 4.3 09/02/2021    PROT 7.0 09/02/2021     09/24/2021    K 3.7 09/24/2021    CO2 31 09/24/2021     09/24/2021    BUN 19 09/24/2021    CREATININE 1.0 09/24/2021    ALKPHOS 113 09/02/2021    ALT 12 09/02/2021    AST 17 09/02/2021       HgBA1c: No components found for: HGBA1C    Lab Results   Component Value Date    TSH 1.280 12/17/2021     Lab Results   Component Value Date    VITD25 50 09/24/2021       Lab Results   Component Value Date    SEDRATE 20 07/08/2020     Lab Results   Component Value Date    CRP 1.83 (H) 10/13/2020       Lab Results   Component Value Date    VITD25 50 09/24/2021    CALCIUM 10.3 09/24/2021    MG 2.0 10/13/2020     Lab Results   Component Value Date     09/24/2021    K 3.7 09/24/2021     09/24/2021    CO2 31 09/24/2021    BUN 19 09/24/2021    CREATININE 1.0 09/24/2021    GLUCOSE 90 09/24/2021    CALCIUM 10.3 09/24/2021    PROT 7.0 09/02/2021    LABALBU 4.3 09/02/2021    BILITOT 0.3 09/02/2021    ALKPHOS 113 09/02/2021    AST 17 09/02/2021    ALT 12 09/02/2021         RADIOLOGY:     DEXA 8/13/2020  Left hip: -2.50, 0.571  Right hip: -1.80, 0.645  Lumbar spine: -2.40, 0.782     DEXA 5/9/2017  Left hip: -2.10, 0.611  Right hip: -2.00, 0.630  Lumbar spine: -2.00, 0.826      Assessment and plan:  1. Age-related osteoporosis without current pathological fracture  - A 78year old  female diagnosed with osteoporosis w/ T score -2.5 on recent DEXA. +fmhx of osteoporosis in sister. Denies any fmhx of fracture. Patient denies any fragility fractures.  Has not been on treatment in the past.   - prior tx: alendronate (side effects)  - again discussed prolia vs reclast- patient declining treatment  - continue calcium (1200 mg daily) and vitamin D3 (2000 IU daily) supplementation  - Repeat DEXA ordered- if patient continued to decline treatment will refer patient back to PCP for further DEXA follow up      No follow-ups on file. Electronically signed by LEFTY Londono CNP on 3/28/2022 at 10:06 AM      Thank you for allowing me to participate in the care of this patient. Please call if there are any questions.

## 2022-04-16 ENCOUNTER — HOSPITAL ENCOUNTER (EMERGENCY)
Age: 72
Discharge: HOME | End: 2022-04-16
Attending: EMERGENCY MEDICINE
Payer: MEDICARE

## 2022-04-16 VITALS
DIASTOLIC BLOOD PRESSURE: 74 MMHG | OXYGEN SATURATION: 97 % | TEMPERATURE: 96.8 F | BODY MASS INDEX: 23.41 KG/M2 | WEIGHT: 145 LBS | HEART RATE: 94 BPM | SYSTOLIC BLOOD PRESSURE: 151 MMHG | RESPIRATION RATE: 18 BRPM

## 2022-04-16 DIAGNOSIS — J01.10 ACUTE NON-RECURRENT FRONTAL SINUSITIS: Primary | ICD-10-CM

## 2022-04-16 PROCEDURE — 99213 OFFICE O/P EST LOW 20 MIN: CPT | Performed by: EMERGENCY MEDICINE

## 2022-04-16 PROCEDURE — 99213 OFFICE O/P EST LOW 20 MIN: CPT

## 2022-04-16 RX ORDER — BENZONATATE 200 MG/1
200 CAPSULE ORAL 3 TIMES DAILY PRN
Qty: 30 CAPSULE | Refills: 0 | Status: SHIPPED | OUTPATIENT
Start: 2022-04-16 | End: 2022-04-23

## 2022-04-16 RX ORDER — DOXYCYCLINE HYCLATE 100 MG
100 TABLET ORAL 2 TIMES DAILY
Qty: 14 TABLET | Refills: 0 | Status: SHIPPED | OUTPATIENT
Start: 2022-04-16 | End: 2022-04-23

## 2022-04-16 RX ORDER — PREDNISONE 20 MG/1
20 TABLET ORAL DAILY
Qty: 5 TABLET | Refills: 0 | Status: CANCELLED | OUTPATIENT
Start: 2022-04-16 | End: 2022-04-21

## 2022-04-16 ASSESSMENT — ENCOUNTER SYMPTOMS
SHORTNESS OF BREATH: 0
EYE PAIN: 0
DIARRHEA: 0
CHEST TIGHTNESS: 1
CONSTIPATION: 0
COUGH: 1
SINUS PRESSURE: 1
ABDOMINAL PAIN: 0

## 2022-04-16 NOTE — ED PROVIDER NOTES
[phone]       Chief Complaint   Patient presents with    Cough     head congestion, sinus pressure       Nurses Notes reviewed and I agree except as noted in the HPI. HISTORY OF PRESENT ILLNESS   Zoya Quijano is a 81 y.o. female who presents for sinus congestion. 8 days of symptoms. Has cough, no fever, no lymphadenopathy, and no exudate. Slightly worse shortness of air with exertion but nonhypoxic. Has been eating and drinking well. Normal urination and stools. Has sick contacts as  also is recovering from PNA but no known COVID exposure. Mild headaches, but no wheezing, fatigue, muscle aches, or rashes. No COPD. Patient has tried tylenol, and coricidin HBP with improvement. Is immunized. Does not smoke. REVIEW OF SYSTEMS     Review of Systems   Constitutional: Negative for appetite change and unexpected weight change. HENT: Positive for congestion and sinus pressure. Negative for hearing loss. Eyes: Negative for pain and visual disturbance. Respiratory: Positive for cough and chest tightness. Negative for shortness of breath. Cardiovascular: Negative for chest pain and leg swelling. Gastrointestinal: Negative for abdominal pain, constipation and diarrhea. Genitourinary: Negative for difficulty urinating and dysuria. Musculoskeletal: Negative for arthralgias and myalgias. Psychiatric/Behavioral: Negative for behavioral problems and sleep disturbance.        PAST MEDICAL HISTORY         Diagnosis Date    Allergic rhinitis     Arthritis     left thumb    GERD (gastroesophageal reflux disease)     HTN (hypertension) January 2013    Hyperlipemia 2004    Hypothyroid 2009    Levothyroxine    IBS (irritable bowel syndrome)     Knee cap dislocation     broke right knee    Metatarsal fracture     left    Multilevel degenerative disc disease 2009    Takes Darl Dark off and on as needed    Osteoarthritis 1982    Osteopenia 2008    PUD (peptic ulcer disease) 2009    Recurrent low back pain 1992    disc disease       SURGICAL HISTORY     Patient  has a past surgical history that includes Dental surgery (1975); Bunionectomy (1991); nancy and bso (cervix removed) (2001); laparoscopy (7145); Colonoscopy (2005); Knee arthroscopy (2004); Skin cancer excision (07-17-13); and Hysterectomy (2001). CURRENT MEDICATIONS       Previous Medications    ACETAMINOPHEN (TYLENOL) 325 MG TABLET    Take 650 mg by mouth every 6 hours as needed for Pain    ALBUTEROL SULFATE HFA (PROVENTIL HFA) 108 (90 BASE) MCG/ACT INHALER    Inhale 2 puffs into the lungs every 6 hours as needed for Wheezing    ASPIRIN 81 MG TABLET    Take 81 mg by mouth daily    BACLOFEN (LIORESAL) 5 MG TABLET    Take 1 tablet by mouth every evening    CALCIUM CARBONATE (OSCAL) 500 MG TABS TABLET    Take 500 mg by mouth daily    CARBAMAZEPINE (TEGRETOL-XR) 100 MG EXTENDED RELEASE TABLET    Take 1 tablet by mouth 2 times daily    CHOLECALCIFEROL (D3 VITAMIN PO)    Take 1,000 Units by mouth daily     COENZYME Q10 (COQ-10 PO)    Take 1 tablet by mouth daily     FIBER PO    Take by mouth    FLUTICASONE (FLONASE) 50 MCG/ACT NASAL SPRAY    2 sprays by Nasal route daily    KRILL  MG CAPS    Take 1 tablet by mouth daily    LEVOTHYROXINE (SYNTHROID) 50 MCG TABLET    TAKE 1 TABLET DAILY    LISINOPRIL (PRINIVIL;ZESTRIL) 20 MG TABLET    Take 1 tablet by mouth daily    LOPERAMIDE HCL (IMODIUM PO)    Take  by mouth daily as needed.     MAGNESIUM PO    Take 120 mg by mouth 2 times daily    MENAQUINONE-7 (VITAMIN K2 PO)    Take 150 mcg by mouth Daily    OMEGA-3 FATTY ACIDS (FISH OIL PO)    Take by mouth    SELENIUM (SELENIMIN-200 PO)    Take by mouth    VITAMIN C (ASCORBIC ACID) 500 MG TABLET    Take 500 mg by mouth daily    ZINC 50 MG CAPS    Take by mouth       ALLERGIES     Patient is is allergic to amoxicillin, darvon [propoxyphene hcl], seasonal, and sudafed [pseudoephedrine hcl]. FAMILY HISTORY     Patient'sfamily history includes BRCA 2 Positive (age of onset: 29) in her niece; Breast Cancer (age of onset: 29) in her niece; Breast Cancer (age of onset: 61) in her mother; Cancer in her maternal cousin and maternal grandmother; Diabetes in her paternal grandmother; Heart Disease in her father and maternal uncle; Heart Disease (age of onset: 64) in her sister; Heart Disease (age of onset: 10) in her brother; Heart Disease (age of onset: 79) in her mother; Heart Failure (age of onset: 76) in her mother; High Blood Pressure in her father; Other (age of onset: 21) in her mother; Other (age of onset: 76) in her father; Pancreatic Cancer in her paternal aunt and paternal aunt; Thyroid Disease (age of onset: 43) in her mother. SOCIAL HISTORY     Patient  reports that she quit smoking about 48 years ago. She has a 0.25 pack-year smoking history. She has never used smokeless tobacco. She reports previous alcohol use. She reports that she does not use drugs. PHYSICAL EXAM     ED TRIAGE VITALS  BP: (!) 151/74, Temp: 96.8 °F (36 °C), Pulse: 94, Resp: 18, SpO2: 97 %  Physical Exam  Vitals and nursing note reviewed. Constitutional:       General: She is not in acute distress. HENT:      Head: Normocephalic and atraumatic. Nose: Nose normal.      Mouth/Throat:      Mouth: Mucous membranes are moist.      Pharynx: Oropharynx is clear. Eyes:      Extraocular Movements: Extraocular movements intact. Pupils: Pupils are equal, round, and reactive to light. Cardiovascular:      Rate and Rhythm: Normal rate and regular rhythm. Pulses: Normal pulses. Heart sounds: Normal heart sounds. Pulmonary:      Effort: Pulmonary effort is normal.      Breath sounds: Normal breath sounds. Abdominal:      Palpations: Abdomen is soft. Tenderness: There is no abdominal tenderness. Musculoskeletal:         General: No signs of injury. Normal range of motion. Dufm Frankel, MD Dufm Frankel, MD  Resident  04/16/22 [id number]       Dufm Frankel, MD  Resident  04/16/22 [id number]

## 2022-04-16 NOTE — ED PROVIDER NOTES
[phone]       Chief Complaint   Patient presents with    Cough     head congestion, sinus pressure       Nurses Notes reviewed and I agree except as noted in the HPI. HISTORY OF PRESENT ILLNESS   Brenda Alonso is a 81 y.o. female who presents with cough. Nai Kearns MD,  personally performed and participated in key or critical portions of the evaluation and management including personally performing the exam and medical decision making. I verify the accuracy of the documentation by the resident. Please review resident note for specifics and further details of this urgent care evaluation.      Electronically signed by Bernadette Bowie MD on 4/16/2022 at 11:42 AM     Bernadette Bowie MD  04/16/22 1146

## 2022-04-16 NOTE — ED TRIAGE NOTES
Patient to room with c/o head congestion, sinus pressure, and productive cough beginning one week ago.

## 2022-04-18 ENCOUNTER — TELEPHONE (OUTPATIENT)
Dept: FAMILY MEDICINE CLINIC | Age: 72
End: 2022-04-18

## 2022-04-18 NOTE — LETTER
[address]  [address] [facility], [facility] , [address]  Phone: [phone]  Fax: [phone]    April 18, 2022    [address] [facility] [address]      Dear Darian Taylor,    This letter is regarding your Emergency Department (ED) visit at [address] [address] on 4/16/22. Speedy Bangura wanted to make sure that you understand your discharge instructions and that you were able to fill any prescriptions that may have been ordered for you. Please contact the office at the above phone number if the ED advised to you follow up with Speedy Bangura, or if you have any further questions or needs. Also did you know -   *Visiting the ED for a non-emergency could result in higher co-pays than you would normally be subject to paying? *You can call your doctor even after hours so they can direct you to the most appropriate care. [facility]) practices can often offer you an appointment on the same day that you call. *We have some [facility] offices that offer Walk-in appointments; check our website for availability in your community, [email or web address].      *Evisits are now available for patients for $36 through [employer] for certain conditions:  * Sinus, cold and or cough       * Diarrhea            * Headache  * Heartburn                                * Poison Elsa          * Back pain     * Urinary problems                         If you do not have [employer] and are interested, please contact the office and a staff member may assist you or go to [email or web address].     Sincerely,   Maulik Wade MD and your [facility]

## 2022-04-21 ENCOUNTER — OFFICE VISIT (OUTPATIENT)
Dept: FAMILY MEDICINE CLINIC | Age: 72
End: 2022-04-21
Payer: MEDICARE

## 2022-04-21 VITALS
HEIGHT: 67 IN | WEIGHT: 149.4 LBS | SYSTOLIC BLOOD PRESSURE: 131 MMHG | BODY MASS INDEX: 23.45 KG/M2 | TEMPERATURE: 97.4 F | HEART RATE: 68 BPM | DIASTOLIC BLOOD PRESSURE: 63 MMHG

## 2022-04-21 DIAGNOSIS — E03.9 ACQUIRED HYPOTHYROIDISM: ICD-10-CM

## 2022-04-21 DIAGNOSIS — Z78.9 STATIN INTOLERANCE: ICD-10-CM

## 2022-04-21 DIAGNOSIS — I10 PRIMARY HYPERTENSION: Primary | ICD-10-CM

## 2022-04-21 DIAGNOSIS — Z91.81 AT HIGH RISK FOR FALLS: ICD-10-CM

## 2022-04-21 DIAGNOSIS — E78.00 HYPERCHOLESTEROLEMIA: ICD-10-CM

## 2022-04-21 DIAGNOSIS — G50.0 TRIGEMINAL NEURALGIA: ICD-10-CM

## 2022-04-21 PROCEDURE — G8427 DOCREV CUR MEDS BY ELIG CLIN: HCPCS | Performed by: NURSE PRACTITIONER

## 2022-04-21 PROCEDURE — 1090F PRES/ABSN URINE INCON ASSESS: CPT | Performed by: NURSE PRACTITIONER

## 2022-04-21 PROCEDURE — 1123F ACP DISCUSS/DSCN MKR DOCD: CPT | Performed by: NURSE PRACTITIONER

## 2022-04-21 PROCEDURE — 4040F PNEUMOC VAC/ADMIN/RCVD: CPT | Performed by: NURSE PRACTITIONER

## 2022-04-21 PROCEDURE — 3017F COLORECTAL CA SCREEN DOC REV: CPT | Performed by: NURSE PRACTITIONER

## 2022-04-21 PROCEDURE — G8399 PT W/DXA RESULTS DOCUMENT: HCPCS | Performed by: NURSE PRACTITIONER

## 2022-04-21 PROCEDURE — G8420 CALC BMI NORM PARAMETERS: HCPCS | Performed by: NURSE PRACTITIONER

## 2022-04-21 PROCEDURE — 99214 OFFICE O/P EST MOD 30 MIN: CPT | Performed by: NURSE PRACTITIONER

## 2022-04-21 PROCEDURE — 1036F TOBACCO NON-USER: CPT | Performed by: NURSE PRACTITIONER

## 2022-04-21 RX ORDER — CARBAMAZEPINE 100 MG/1
100 TABLET, EXTENDED RELEASE ORAL 2 TIMES DAILY
Qty: 60 TABLET | Refills: 3 | Status: CANCELLED | OUTPATIENT
Start: 2022-04-21

## 2022-04-21 RX ORDER — LISINOPRIL 20 MG/1
20 TABLET ORAL DAILY
Qty: 90 TABLET | Refills: 1 | Status: SHIPPED | OUTPATIENT
Start: 2022-04-21

## 2022-04-21 ASSESSMENT — ENCOUNTER SYMPTOMS
ABDOMINAL PAIN: 1
COUGH: 1

## 2022-04-21 NOTE — PROGRESS NOTES
Meoldy Blanco (:  1950) is a 81 y.o. female,Established patient, here for evaluation of the following chief complaint(s):  Follow-up and Hypertension (Increased Lisinopril at least appointment -- continued to have elevated blood pressure readings )         ASSESSMENT/PLAN:  1. Primary hypertension  -     lisinopril (PRINIVIL;ZESTRIL) 20 MG tablet; Take 1 tablet by mouth daily, Disp-90 tablet, R-1Normal  2. Hypercholesterolemia  3. Statin intolerance  4. Acquired hypothyroidism  5. Trigeminal neuralgia  6. At high risk for falls    Continue current medications. Will give information about Repatha for hypercholesterolemia. Advised patient to let us know if she needs more Tessalon to help with her cough, and a cough from a respiratory infection can last for several weeks post-acute infection    Return in about 6 months (around 10/21/2022) for AWV. Subjective   SUBJECTIVE/OBJECTIVE:  HPI  She is a retired nurse. Seen 1 week ago in urgent care for some URI symptoms with cough and congestion. States she was having yellow sputum. She was treated with doxycycline and Tessalon Perles. Continues to have a little bit of a cough which the Tessalon helps. Was taking Mucinex prior, but states Mucinex does not help    HTN:  Lisinopril 20 mg daily. 948'T systolic. Limits red meat, low salt. Not as active as she used to be due to a previous broken foot. Takes care of property, animals. Hx palpitations with negative cardiac workup. They improved when she is on her fish oil. No chest pain, shortness of breath or ankle edema. FH of heart disease in her mother. Hypercholesterolemia, LDL was 161 in 2021. She has not tolerated statins in the past.     Acquired hypothyroidism: levothyroxine 50 mcg daily by itself with a full glass of water. Baclofen 5 mg 1 tablet every evening for chronic right hip pain. She has age-related osteoporosis and follows with rheumatology.   According to the note from March she did not tolerate Alendronate due to side effect; has discussed Prolia versus Reclast and the patient declined. Vitamin D and calcium supplements. Trigeminal neuralgia, has tegretol from neurology that she only takes as needed for flare ups. Reports history of IBS, has seen Dr. Ema Rajput in the past  Olivia Dural last August and broke her left foot. Her fall risk was triggered due to that. Review of Systems   Respiratory: Positive for cough. Gastrointestinal: Positive for abdominal pain (chronic IBS). Musculoskeletal: Positive for arthralgias (chronic right hip). All other systems reviewed and are negative. Objective   Physical Exam  Vitals reviewed. Constitutional:       General: She is not in acute distress. Appearance: She is well-developed. HENT:      Head: Normocephalic. Right Ear: External ear normal.      Left Ear: External ear normal.      Nose: Nose normal.      Mouth/Throat:      Pharynx: No oropharyngeal exudate. Eyes:      Conjunctiva/sclera: Conjunctivae normal.      Pupils: Pupils are equal, round, and reactive to light. Neck:      Thyroid: No thyromegaly. Cardiovascular:      Rate and Rhythm: Normal rate and regular rhythm. Heart sounds: Normal heart sounds. No murmur heard. No friction rub. No gallop. Pulmonary:      Effort: Pulmonary effort is normal. No respiratory distress. Breath sounds: Normal breath sounds. No wheezing or rales. Abdominal:      General: There is no distension. Palpations: Abdomen is soft. There is no mass. Tenderness: There is no abdominal tenderness. There is no guarding. Musculoskeletal:         General: Normal range of motion. Cervical back: Normal range of motion and neck supple. Lymphadenopathy:      Cervical: No cervical adenopathy. Skin:     General: Skin is warm and dry. Capillary Refill: Capillary refill takes less than 2 seconds.    Neurological:      Mental Status: She is alert and oriented to person, place, and time. Psychiatric:         Behavior: Behavior normal.         Thought Content: Thought content normal.         Judgment: Judgment normal.                  An electronic signature was used to authenticate this note. --LEFTY Conteh - CNP   On the basis of positive falls risk screening, assessment and plan is as follows: home safety tips provided.

## 2022-05-23 ENCOUNTER — HOSPITAL ENCOUNTER (OUTPATIENT)
Dept: WOMENS IMAGING | Age: 72
Discharge: HOME | End: 2022-05-23
Payer: MEDICARE

## 2022-05-23 DIAGNOSIS — Z12.31 VISIT FOR SCREENING MAMMOGRAM: ICD-10-CM

## 2022-05-23 PROCEDURE — 77063 BREAST TOMOSYNTHESIS BI: CPT

## 2022-06-17 ENCOUNTER — OFFICE VISIT (OUTPATIENT)
Dept: NEUROLOGY | Age: 72
End: 2022-06-17
Payer: MEDICARE

## 2022-06-17 VITALS
BODY MASS INDEX: 23.98 KG/M2 | HEART RATE: 62 BPM | DIASTOLIC BLOOD PRESSURE: 82 MMHG | HEIGHT: 66 IN | SYSTOLIC BLOOD PRESSURE: 120 MMHG | OXYGEN SATURATION: 98 % | WEIGHT: 149.2 LBS

## 2022-06-17 DIAGNOSIS — G50.0 TRIGEMINAL NEURALGIA: Primary | ICD-10-CM

## 2022-06-17 DIAGNOSIS — R51.9 RIGHT-SIDED FACE PAIN: ICD-10-CM

## 2022-06-17 PROCEDURE — G8427 DOCREV CUR MEDS BY ELIG CLIN: HCPCS | Performed by: PSYCHIATRY & NEUROLOGY

## 2022-06-17 PROCEDURE — G8420 CALC BMI NORM PARAMETERS: HCPCS | Performed by: PSYCHIATRY & NEUROLOGY

## 2022-06-17 PROCEDURE — 1036F TOBACCO NON-USER: CPT | Performed by: PSYCHIATRY & NEUROLOGY

## 2022-06-17 PROCEDURE — 1090F PRES/ABSN URINE INCON ASSESS: CPT | Performed by: PSYCHIATRY & NEUROLOGY

## 2022-06-17 PROCEDURE — 1123F ACP DISCUSS/DSCN MKR DOCD: CPT | Performed by: PSYCHIATRY & NEUROLOGY

## 2022-06-17 PROCEDURE — G8399 PT W/DXA RESULTS DOCUMENT: HCPCS | Performed by: PSYCHIATRY & NEUROLOGY

## 2022-06-17 PROCEDURE — 99213 OFFICE O/P EST LOW 20 MIN: CPT | Performed by: PSYCHIATRY & NEUROLOGY

## 2022-06-17 PROCEDURE — 3017F COLORECTAL CA SCREEN DOC REV: CPT | Performed by: PSYCHIATRY & NEUROLOGY

## 2022-06-17 NOTE — PROGRESS NOTES
NEUROLOGY OUT PATIENT FOLLOW UP NOTE:      Zandra Vargas is here for follow up for right facial pain. Allergies   Allergen Reactions    Amoxicillin Diarrhea    Darvon [Propoxyphene Hcl] Other (See Comments)     Feeling of extremity numbness.  Seasonal       cat, grass, dust, mold    Sudafed [Pseudoephedrine Hcl] Other (See Comments)     Restless feeling.      Current Outpatient Medications on File Prior to Visit   Medication Sig Dispense Refill    lisinopril (PRINIVIL;ZESTRIL) 20 MG tablet Take 1 tablet by mouth daily 90 tablet 1    FIBER PO Take by mouth      Baclofen (LIORESAL) 5 MG tablet Take 1 tablet by mouth every evening 90 tablet 1    levothyroxine (SYNTHROID) 50 MCG tablet TAKE 1 TABLET DAILY 90 tablet 1    carBAMazepine (TEGRETOL-XR) 100 MG extended release tablet Take 1 tablet by mouth 2 times daily (Patient taking differently: Take 100 mg by mouth 2 times daily as needed ) 60 tablet 3    Menaquinone-7 (VITAMIN K2 PO) Take 150 mcg by mouth Daily      zinc 50 MG CAPS Take by mouth      MAGNESIUM PO Take 120 mg by mouth 2 times daily      fluticasone (FLONASE) 50 MCG/ACT nasal spray 2 sprays by Nasal route daily (Patient taking differently: 2 sprays by Nasal route daily as needed ) 1 Bottle 5    Omega-3 Fatty Acids (FISH OIL PO) Take by mouth      albuterol sulfate HFA (PROVENTIL HFA) 108 (90 Base) MCG/ACT inhaler Inhale 2 puffs into the lungs every 6 hours as needed for Wheezing 1 Inhaler 3    acetaminophen (TYLENOL) 325 MG tablet Take 650 mg by mouth every 6 hours as needed for Pain      Coenzyme Q10 (COQ-10 PO) Take 1 tablet by mouth daily       aspirin 81 MG tablet Take 81 mg by mouth daily      calcium carbonate (OSCAL) 500 MG TABS tablet Take 500 mg by mouth daily      Cholecalciferol (D3 VITAMIN PO) Take 1,000 Units by mouth daily       vitamin C (ASCORBIC ACID) 500 MG tablet Take 500 mg by mouth daily      Krill Oil 500 MG CAPS Take 1 tablet by mouth daily      Loperamide HCl (IMODIUM PO) Take  by mouth daily as needed. No current facility-administered medications on file prior to visit. I reviewed the past medical history, allergies, medications, social history and family history. PE:   Vitals:    06/17/22 1104   BP: 120/82   Pulse: 62   SpO2: 98%   Weight: 149 lb 3.2 oz (67.7 kg)   Height: 5' 6\" (1.676 m)      General Appearance:  awake, alert, oriented, in no acute distress  Gen: NAD, Language is Intact. Skin: no rash, lesion, dry  to touch. warm  Head: no rash, no icterus  Neck: There is no carotid bruits. The Neck is supple. There is no neck lymphadenopathy. Neuro: CN 2-12 grossly intact with no focal deficits. Power 5/5 Throughout symmetric, Reflexes are  symmetric. Long tracts are intact. Cerebellar exam is Intact. Sensory exam is intact to light touch. Gait is intact. Musculoskeletal:  Has no hand arthritis, no limitation of ROM in any of the four extremities. Lower extremities no edema          DATA:      MRI Cervical Spine WO Contrast    Narrative  PROCEDURE: MRI CERVICAL SPINE WO CONTRAST    CLINICAL INFORMATION: Ataxic gait, Hyperreflexia. COMPARISON: None available. TECHNIQUE: Sagittal T1, T2 and STIR sequences were obtained through the cervical spine. Axial fast and echo and gradient echo T2-weighted images were obtained. FINDINGS:    The cervical spine is imaged from the craniocervical junction to the superior aspect of T3. No suspicious finding is identified at the cervical medullary junction. No abnormal signal or expansion is present within the cervical spinal cord. There is preservation of the expected cervical lordosis. Minimal anterolisthesis is present of C6 on C7 and C7 on T1. No acute fracture or suspicious marrow replacing lesion is identified. Mild degenerative facet arthropathy is present at every level. With regards to the disc spaces, at C2-C3, there is a disc bulge. The spinal canal is patent.  The neural foramina are also patent. At C3-C4, there is a disc bulge. The spinal canal and neural foramina are patent. At C4-C5, there is a disc osteophyte complex. The spinal canal is patent. Uncovertebral joint spurring causes mild neural foraminal narrowing on the right without significant neural foraminal narrowing on the left. At C5-C6, there is a disc osteophyte complex causing mild spinal canal narrowing. The neural foramina are patent. At C6-C7, the spinal canal and neural foramina are patent. At C7-T1, the spinal canal and neural foramina are patent. No suspicious finding is identified within the visualized paraspinal soft tissues. Impression  Multilevel degenerative changes are present throughout the cervical spine and are further discussed by level in the findings. These are most significant at C5-C6 where there is a disc osteophyte complex causing mild spinal canal narrowing. Degenerative  facet arthropathy and uncovertebral joint spurring is also present resulting in mild neural foraminal narrowing on the right at C4-C5. **This report has been created using voice recognition software. It may contain minor errors which are inherent in voice recognition technology. **    Final report electronically signed by Dr. Nisreen Keller on 11/8/2019 10:16 AM      MRI BRAIN WO CONTRAST    Narrative  PROCEDURE: MRI BRAIN WO CONTRAST    CLINICAL INFORMATION Ataxia. COMPARISON: MRI of the brain dated July 21, 2008. TECHNIQUE: Multiplanar and multiple spin echo MRI images were obtained of the brain without contrast.    FINDINGS:    There is mild prominence of the sulcal spaces and ventricular system secondary to generalized, age-related parenchymal volume loss. Mild, scattered punctate foci of hyperintense T2 signal are present throughout the deep cerebral white matter. These  appear more numerous compared to the prior exam. No restricted diffusion is identified within the brain.  No abnormal susceptibility is present. The ventricles are similar in size and morphology without evidence of hydrocephalus. No mass, mass effect or extra-axial fluid collection is identified. The basal cisterns and visualized vascular flow voids are patent. The pituitary, brain stem and  cervical medullary junction are within normal limits. The visualized orbits and temporal bone structures are unremarkable. The paranasal sinuses are within normal limits. Impression  No acute intracranial abnormality is identified. Senescent changes are present and there has been interval increase in the amount of scattered, punctate foci of hyperintense T2 signal within the deep cerebral white matter. These likely correspond to  mild sequela of chronic microvascular ischemic angiopathy. **This report has been created using voice recognition software. It may contain minor errors which are inherent in voice recognition technology. **    Final report electronically signed by Dr. Cherry Espinal on 11/1/2019 4:22 PM           Assessment:     Diagnosis Orders   1. Trigeminal neuralgia     2. Right-sided face pain           Follow up for right trigeminal neuralgia, she was last seen in 12/2021. She is on Tegretol 100 mg twice a day. She was doing well. Tegretol helps with facial pain is better controlled. She reports left face symptoms of pain started 4 weeks ago, then started with the right face, she reports benefit to the Tegretol. No change in vision, no jaw claudication. No headache, superficial temporal artery pulses are symmetric intact. There is no rash. Her balance is intact, neck is supple. She reports having poison ivy in the recent one to 2 weeks. After detailed discussion with patient we agreed on the following plan. Plan:  1. Sedrate, HSV, VZV, CMV titers, CBC.   2. Continue with Tegretol 100 mg twice a day. Refills given. 3. Follow up in 6 months. 4. Call if any questions or concerns.       Total time 24 min    Kemar Sam MD

## 2022-06-18 LAB
ABSOLUTE BASO #: 0.06 K/UL (ref 0–0.2)
ABSOLUTE EOS #: 0.12 K/UL (ref 0–0.5)
ABSOLUTE LYMPH #: 1.38 K/UL (ref 1–4)
ABSOLUTE MONO #: 0.41 K/UL (ref 0.2–1)
ABSOLUTE NEUT #: 3.04 K/UL (ref 1.5–7.5)
BASOPHILS RELATIVE PERCENT: 1.2 %
EOSINOPHILS RELATIVE PERCENT: 2.4 %
HCT VFR BLD CALC: 38.6 % (ref 34–45)
HEMOGLOBIN: 12.2 G/DL (ref 11.5–15.5)
HSV IGM AB SCREEN: 0.36 INDEX
LYMPHOCYTE %: 27.5 %
MCH RBC QN AUTO: 29 PG (ref 25–33)
MCHC RBC AUTO-ENTMCNC: 31.6 G/DL (ref 31–36)
MCV RBC AUTO: 91.7 FL (ref 80–99)
MONOCYTES # BLD: 8.2 %
NEUTROPHILS RELATIVE PERCENT: 60.5 %
PDW BLD-RTO: 13.2 % (ref 11.5–15)
PLATELETS: 359 K/UL (ref 130–400)
PMV BLD AUTO: 9.9 FL (ref 9.3–13)
RBC: 4.21 M/UL (ref 3.8–5.4)
SEDIMENTATION RATE, ERYTHROCYTE: 16 MM/HR (ref 0–20)
VARICELLA ZOSTER AB IGM: 0.24 ISR
WBC: 5 K/UL (ref 3.5–11)

## 2022-08-08 DIAGNOSIS — E03.9 ACQUIRED HYPOTHYROIDISM: Primary | ICD-10-CM

## 2022-08-08 RX ORDER — LEVOTHYROXINE SODIUM 0.05 MG/1
TABLET ORAL
Qty: 90 TABLET | Refills: 1 | OUTPATIENT
Start: 2022-08-08

## 2022-08-08 RX ORDER — LEVOTHYROXINE SODIUM 50 MCG
50 TABLET ORAL DAILY
Qty: 90 TABLET | Refills: 1 | Status: SHIPPED | OUTPATIENT
Start: 2022-08-08

## 2022-09-09 ENCOUNTER — HOSPITAL ENCOUNTER (OUTPATIENT)
Dept: WOMENS IMAGING | Age: 72
Discharge: HOME | End: 2022-09-09
Payer: MEDICARE

## 2022-09-09 DIAGNOSIS — M81.0 AGE-RELATED OSTEOPOROSIS WITHOUT CURRENT PATHOLOGICAL FRACTURE: ICD-10-CM

## 2022-09-09 PROCEDURE — 77080 DXA BONE DENSITY AXIAL: CPT

## 2022-11-01 RX ORDER — BACLOFEN 5 MG/1
5 TABLET ORAL EVERY EVENING
Qty: 90 TABLET | Refills: 1 | Status: SHIPPED | OUTPATIENT
Start: 2022-11-01

## 2022-11-22 ENCOUNTER — TELEPHONE (OUTPATIENT)
Dept: FAMILY MEDICINE CLINIC | Age: 72
End: 2022-11-22

## 2022-11-22 NOTE — TELEPHONE ENCOUNTER
----- Message from Jessy Archer sent at 11/22/2022  9:51 AM EST -----  Subject: Message to Provider    QUESTIONS  Information for Provider? Patient is coming up on a yrl visit and wants to   set up an appt for thyroid blood work too. call her back to schedule at   new location  ---------------------------------------------------------------------------  --------------  [id number]  [phone]; OK to leave message on voicemail  ---------------------------------------------------------------------------  --------------  SCRIPT ANSWERS  Relationship to Patient?  Self

## 2022-11-23 DIAGNOSIS — E03.9 ACQUIRED HYPOTHYROIDISM: Primary | ICD-10-CM

## 2022-11-23 DIAGNOSIS — M81.0 OSTEOPOROSIS, UNSPECIFIED OSTEOPOROSIS TYPE, UNSPECIFIED PATHOLOGICAL FRACTURE PRESENCE: ICD-10-CM

## 2022-11-23 DIAGNOSIS — E78.00 HYPERCHOLESTEROLEMIA: ICD-10-CM

## 2022-11-23 DIAGNOSIS — I10 PRIMARY HYPERTENSION: ICD-10-CM

## 2022-12-01 ENCOUNTER — TELEPHONE (OUTPATIENT)
Dept: FAMILY MEDICINE CLINIC | Age: 72
End: 2022-12-01

## 2022-12-01 NOTE — TELEPHONE ENCOUNTER
Spoke with pt, pt is scheduled for 4/10/23 with Dr Tess Carter at the new location.  Pt verbalized understanding

## 2022-12-01 NOTE — TELEPHONE ENCOUNTER
----- Message from Benson Duron sent at 12/1/2022  1:24 PM EST -----  Subject: Appointment Request    Reason for Call: Established Patient Appointment needed: Routine Medicare   AWV    QUESTIONS    Reason for appointment request? No appointments available during search     Additional Information for Provider?  Please call pt back to schedule AWV   in January  ---------------------------------------------------------------------------  --------------  Madison De Oliveira INFO  [phone]; OK to leave message on voicemail  ---------------------------------------------------------------------------  --------------  SCRIPT ANSWERS  COVID Screen: [facility] Alex

## 2022-12-01 NOTE — TELEPHONE ENCOUNTER
----- Message from Soila Natan sent at 12/1/2022  1:16 PM EST -----  Subject: Message to Provider    QUESTIONS  Information for Provider? Pt would like to know if thyroid labs have been   ordered for yearly blood work. Please call pt back in regards to her   request  ---------------------------------------------------------------------------  --------------  [facility]  [phone]; OK to leave message on voicemail  ---------------------------------------------------------------------------  --------------  SCRIPT ANSWERS  Relationship to Patient?  Self

## 2022-12-06 DIAGNOSIS — I10 PRIMARY HYPERTENSION: ICD-10-CM

## 2022-12-06 RX ORDER — ALBUTEROL SULFATE 90 UG/1
2 AEROSOL, METERED RESPIRATORY (INHALATION) EVERY 6 HOURS PRN
Qty: 1 EACH | Refills: 3 | Status: SHIPPED | OUTPATIENT
Start: 2022-12-06

## 2022-12-06 RX ORDER — FLUTICASONE PROPIONATE 50 MCG
2 SPRAY, SUSPENSION (ML) NASAL DAILY
Qty: 1 EACH | Refills: 5 | Status: SHIPPED | OUTPATIENT
Start: 2022-12-06 | End: 2023-12-01

## 2022-12-06 NOTE — TELEPHONE ENCOUNTER
Patient's last appointment was : 6/5/2020  Patient's next appointment is : 04/10/2023  Future Appointments   Date Time Provider Lucas Perez   12/19/2022 11:15 AM [address] Neurology -   2/20/2023  9:45 AM Thelma Henao MD N [facility] - [address] [facility]   4/10/2023 10:00 AM Bessie Freed MD [facility] - [place]     Last refilled:01/22/2020    No results found for: LABA1C  Lab Results   Component Value Date    CHOL 256 (H) 12/17/2021    TRIG 172 12/17/2021    HDL 61 12/17/2021    LDLCALC 161 12/17/2021     Lab Results   Component Value Date     04/11/2022    K 4.4 04/11/2022     04/11/2022    CO2 29 04/11/2022    BUN 16 04/11/2022    CREATININE 0.9 04/11/2022    GLUCOSE 98 04/11/2022    CALCIUM 9.7 04/11/2022    PROT 6.7 04/11/2022    LABALBU 4.1 04/11/2022    BILITOT 0.4 04/11/2022    ALKPHOS 153 (H) 04/11/2022    AST 19 04/11/2022    ALT 13 04/11/2022    LABGLOM 62 (A) 04/11/2022     Lab Results   Component Value Date    TSH 1.280 12/17/2021    T4FREE 1.53 12/17/2021     Lab Results   Component Value Date    WBC 5.0 06/17/2022    HGB 12.2 06/17/2022    HCT 38.6 06/17/2022    MCV 91.7 06/17/2022     06/17/2022

## 2022-12-06 NOTE — TELEPHONE ENCOUNTER
Patient's last appointment was : Visit date not found  Patient's next appointment is : 04/10/2023  Future Appointments   Date Time Provider Lucas Perez   12/19/2022 11:15 AM [address] Neurology -   2/20/2023  9:45 AM Edwardo Blanco MD N [facility] Heart [facility] - [address] [facility]   4/10/2023 10:00 AM Lilliana Mcgee MD [facility] FM RES [facility] [place]     Last refilled:02/13/2020    No results found for: LABA1C  Lab Results   Component Value Date    CHOL 256 (H) 12/17/2021    TRIG 172 12/17/2021    HDL 61 12/17/2021    LDLCALC 161 12/17/2021     Lab Results   Component Value Date     04/11/2022    K 4.4 04/11/2022     04/11/2022    CO2 29 04/11/2022    BUN 16 04/11/2022    CREATININE 0.9 04/11/2022    GLUCOSE 98 04/11/2022    CALCIUM 9.7 04/11/2022    PROT 6.7 04/11/2022    LABALBU 4.1 04/11/2022    BILITOT 0.4 04/11/2022    ALKPHOS 153 (H) 04/11/2022    AST 19 04/11/2022    ALT 13 04/11/2022    LABGLOM 62 (A) 04/11/2022     Lab Results   Component Value Date    TSH 1.280 12/17/2021    T4FREE 1.53 12/17/2021     Lab Results   Component Value Date    WBC 5.0 06/17/2022    HGB 12.2 06/17/2022    HCT 38.6 06/17/2022    MCV 91.7 06/17/2022     06/17/2022

## 2022-12-09 RX ORDER — LISINOPRIL 20 MG/1
20 TABLET ORAL DAILY
Qty: 90 TABLET | Refills: 1 | Status: SHIPPED | OUTPATIENT
Start: 2022-12-09

## 2022-12-09 NOTE — TELEPHONE ENCOUNTER
Patient's last appointment was : 4/21/2022  Patient's next appointment is : 04/10/2023  Future Appointments   Date Time Provider Lucas Perez   12/19/2022 11:15 AM [address] Neurology -   2/20/2023  9:45 AM MD [facility]   4/10/2023 10:00 AM Piper Smith MD [facility] FM RES [facility]     Last refilled:04/21/2022    No results found for: LABA1C  Lab Results   Component Value Date    CHOL 256 (H) 12/17/2021    TRIG 172 12/17/2021    HDL 61 12/17/2021    LDLCALC 161 12/17/2021     Lab Results   Component Value Date     04/11/2022    K 4.4 04/11/2022     04/11/2022    CO2 29 04/11/2022    BUN 16 04/11/2022    CREATININE 0.9 04/11/2022    GLUCOSE 98 04/11/2022    CALCIUM 9.7 04/11/2022    PROT 6.7 04/11/2022    LABALBU 4.1 04/11/2022    BILITOT 0.4 04/11/2022    ALKPHOS 153 (H) 04/11/2022    AST 19 04/11/2022    ALT 13 04/11/2022    LABGLOM 62 (A) 04/11/2022     Lab Results   Component Value Date    TSH 1.280 12/17/2021    T4FREE 1.53 12/17/2021     Lab Results   Component Value Date    WBC 5.0 06/17/2022    HGB 12.2 06/17/2022    HCT 38.6 06/17/2022    MCV 91.7 06/17/2022     06/17/2022

## 2022-12-19 ENCOUNTER — OFFICE VISIT (OUTPATIENT)
Dept: NEUROLOGY | Age: 72
End: 2022-12-19
Payer: MEDICARE

## 2022-12-19 VITALS
SYSTOLIC BLOOD PRESSURE: 122 MMHG | OXYGEN SATURATION: 98 % | BODY MASS INDEX: 22.02 KG/M2 | DIASTOLIC BLOOD PRESSURE: 88 MMHG | HEART RATE: 74 BPM | HEIGHT: 66 IN | WEIGHT: 137 LBS

## 2022-12-19 DIAGNOSIS — R51.9 RIGHT-SIDED FACE PAIN: Primary | ICD-10-CM

## 2022-12-19 DIAGNOSIS — G50.0 TRIGEMINAL NEURALGIA: ICD-10-CM

## 2022-12-19 PROCEDURE — 1036F TOBACCO NON-USER: CPT | Performed by: NURSE PRACTITIONER

## 2022-12-19 PROCEDURE — 3078F DIAST BP <80 MM HG: CPT | Performed by: NURSE PRACTITIONER

## 2022-12-19 PROCEDURE — 99213 OFFICE O/P EST LOW 20 MIN: CPT | Performed by: NURSE PRACTITIONER

## 2022-12-19 PROCEDURE — G8427 DOCREV CUR MEDS BY ELIG CLIN: HCPCS | Performed by: NURSE PRACTITIONER

## 2022-12-19 PROCEDURE — 1090F PRES/ABSN URINE INCON ASSESS: CPT | Performed by: NURSE PRACTITIONER

## 2022-12-19 PROCEDURE — G8399 PT W/DXA RESULTS DOCUMENT: HCPCS | Performed by: NURSE PRACTITIONER

## 2022-12-19 PROCEDURE — G8484 FLU IMMUNIZE NO ADMIN: HCPCS | Performed by: NURSE PRACTITIONER

## 2022-12-19 PROCEDURE — 3017F COLORECTAL CA SCREEN DOC REV: CPT | Performed by: NURSE PRACTITIONER

## 2022-12-19 PROCEDURE — 1123F ACP DISCUSS/DSCN MKR DOCD: CPT | Performed by: NURSE PRACTITIONER

## 2022-12-19 PROCEDURE — G8420 CALC BMI NORM PARAMETERS: HCPCS | Performed by: NURSE PRACTITIONER

## 2022-12-19 PROCEDURE — 3074F SYST BP LT 130 MM HG: CPT | Performed by: NURSE PRACTITIONER

## 2022-12-19 RX ORDER — CARBAMAZEPINE 100 MG/1
100 TABLET, EXTENDED RELEASE ORAL 2 TIMES DAILY
Qty: 180 TABLET | Refills: 1 | Status: SHIPPED | OUTPATIENT
Start: 2022-12-19

## 2022-12-19 NOTE — PROGRESS NOTES
NEUROLOGY OUT PATIENT FOLLOW UP NOTE:  12/19/202211:26 AM    January Drake is here for follow up for  right facial pain. Patient Active Problem List   Diagnosis    Hyperlipemia    PUD (peptic ulcer disease)    Osteopenia    Obstructive sleep apnea    Daytime somnolence    Restless sleeper    Fatigue    Difficulty sleeping    Morning headache    Hypertension    Arthritis    Seasonal allergies    Chest pain    Hypothyroidism    Chest pain, atypical    Intermittent palpitations    Dizziness for yrs on getting up- once in a while    GERD (gastroesophageal reflux disease)    Gait disturbance         ROS:  Respiratory : no cough, no shortness of breath  Cardiac: no chest pain. No palpitations. Renal : no flank pain, no hematuria    Skin: no rash      Allergies   Allergen Reactions    Amoxicillin Diarrhea    Darvon [Propoxyphene Hcl] Other (See Comments)     Feeling of extremity numbness. Seasonal       cat, grass, dust, mold    Sudafed [Pseudoephedrine Hcl] Other (See Comments)     Restless feeling.        Current Outpatient Medications:     NONFORMULARY, Dynamic brain health, Disp: , Rfl:     lisinopril (PRINIVIL;ZESTRIL) 20 MG tablet, Take 1 tablet by mouth daily, Disp: 90 tablet, Rfl: 1    albuterol sulfate HFA (PROVENTIL HFA) 108 (90 Base) MCG/ACT inhaler, Inhale 2 puffs into the lungs every 6 hours as needed for Wheezing (Patient not taking: Reported on 12/19/2022), Disp: 1 each, Rfl: 3    Baclofen (LIORESAL) 5 MG tablet, Take 1 tablet by mouth every evening, Disp: 90 tablet, Rfl: 1    SYNTHROID 50 MCG tablet, Take 1 tablet by mouth in the morning., Disp: 90 tablet, Rfl: 1    FIBER PO, Take by mouth, Disp: , Rfl:     carBAMazepine (TEGRETOL-XR) 100 MG extended release tablet, Take 1 tablet by mouth 2 times daily (Patient taking differently: Take 100 mg by mouth 2 times daily as needed), Disp: 60 tablet, Rfl: 3    Menaquinone-7 (VITAMIN K2 PO), Take 150 mcg by mouth Daily, Disp: , Rfl:     zinc 50 MG CAPS, Take by mouth, Disp: , Rfl:     MAGNESIUM PO, Take 120 mg by mouth 2 times daily, Disp: , Rfl:     Omega-3 Fatty Acids (FISH OIL PO), Take by mouth, Disp: , Rfl:     acetaminophen (TYLENOL) 325 MG tablet, Take 650 mg by mouth every 6 hours as needed for Pain, Disp: , Rfl:     Coenzyme Q10 (COQ-10 PO), Take 1 tablet by mouth daily , Disp: , Rfl:     aspirin 81 MG tablet, Take 81 mg by mouth daily, Disp: , Rfl:     calcium carbonate (OSCAL) 500 MG TABS tablet, Take 500 mg by mouth daily, Disp: , Rfl:     Cholecalciferol (D3 VITAMIN PO), Take 1,000 Units by mouth daily , Disp: , Rfl:     vitamin C (ASCORBIC ACID) 500 MG tablet, Take 500 mg by mouth daily, Disp: , Rfl:     Krill Oil 500 MG CAPS, Take 1 tablet by mouth daily, Disp: , Rfl:     Loperamide HCl (IMODIUM PO), Take  by mouth daily as needed. , Disp: , Rfl:     fluticasone (FLONASE) 50 MCG/ACT nasal spray, 2 sprays by Nasal route daily (Patient not taking: Reported on 12/19/2022), Disp: 1 each, Rfl: 5    I reviewed the past medical history, allergies, medications, social history and family history. PE:   Vitals:    12/19/22 1118   BP: 122/88   Site: Left Upper Arm   Position: Sitting   Cuff Size: Medium Adult   Pulse: 74   SpO2: 98%   Weight: 137 lb (62.1 kg)   Height: 5' 6\" (1.676 m)     General Appearance:  awake, alert, oriented, in no acute distress  Gen: NAD, Language is Intact. Skin: no rash, lesion, dry  to touch. warm  Head: no rash, no icterus  Neck: There is no carotid bruits. The Neck is supple. There is no neck lymphadenopathy. Neuro: CN 2-12 grossly intact with no focal deficits. Power 5/5 Throughout symmetric, Reflexes are  symmetric. Long tracts are intact. Cerebellar exam is Intact. Sensory exam is intact to light touch. Gait is intact. Musculoskeletal:  Has no hand arthritis, no limitation of ROM in any of the four extremities.   Lower extremities no edema             DATA:       Results for orders placed or performed in visit on 06/17/22 CBC   Result Value Ref Range    WBC 5.0 3.5 - 11.0 K/uL    RBC 4.21 3.80 - 5.40 M/uL    Hemoglobin 12.2 11.5 - 15.5 g/dL    Hematocrit 38.6 34.0 - 45.0 %    MCV 91.7 80.0 - 99.0 fL    MCH 29.0 25.0 - 33.0 pg    MCHC 31.6 31.0 - 36.0 g/dL    RDW 13.2 11.5 - 15.0 %    Neutrophils % 60.5 %    Lymphocyte % 27.5 %    Monocytes 8.2 %    Eosinophils % 2.4 %    Basophils % 1.2 %    Platelets 388 053 - 994 K/uL    Absolute Neut # 3.04 1.50 - 7.50 K/uL    Absolute Lymph # 1.38 1.00 - 4.00 K/uL    Absolute Mono # 0.41 0.20 - 1.00 K/uL    Absolute Eos # 0.12 0.00 - 0.50 K/uL    Absolute Baso # 0.06 0.00 - 0.20 K/uL    MPV 9.9 9.3 - 13.0 fL   Sedimentation Rate   Result Value Ref Range    Sed Rate 16 0 - 20 mm/hr   Varicella Zoster Antibody, IgM   Result Value Ref Range    Varicella Zoster Ab IgM 0.24 <0.91 ISR   Herpes Simplex IGM   Result Value Ref Range    HSV IGM AB SCREEN 0.36 SEE BELOW INDEX          Results for orders placed during the hospital encounter of 11/08/19    MRI Cervical Spine WO Contrast    Narrative  PROCEDURE: MRI CERVICAL SPINE WO CONTRAST    CLINICAL INFORMATION: Ataxic gait, Hyperreflexia. COMPARISON: None available. TECHNIQUE: Sagittal T1, T2 and STIR sequences were obtained through the cervical spine. Axial fast and echo and gradient echo T2-weighted images were obtained. FINDINGS:    The cervical spine is imaged from the craniocervical junction to the superior aspect of T3. No suspicious finding is identified at the cervical medullary junction. No abnormal signal or expansion is present within the cervical spinal cord. There is preservation of the expected cervical lordosis. Minimal anterolisthesis is present of C6 on C7 and C7 on T1. No acute fracture or suspicious marrow replacing lesion is identified. Mild degenerative facet arthropathy is present at every level. With regards to the disc spaces, at C2-C3, there is a disc bulge. The spinal canal is patent.  The neural foramina are also patent. At C3-C4, there is a disc bulge. The spinal canal and neural foramina are patent. At C4-C5, there is a disc osteophyte complex. The spinal canal is patent. Uncovertebral joint spurring causes mild neural foraminal narrowing on the right without significant neural foraminal narrowing on the left. At C5-C6, there is a disc osteophyte complex causing mild spinal canal narrowing. The neural foramina are patent. At C6-C7, the spinal canal and neural foramina are patent. At C7-T1, the spinal canal and neural foramina are patent. No suspicious finding is identified within the visualized paraspinal soft tissues. Impression  Multilevel degenerative changes are present throughout the cervical spine and are further discussed by level in the findings. These are most significant at C5-C6 where there is a disc osteophyte complex causing mild spinal canal narrowing. Degenerative  facet arthropathy and uncovertebral joint spurring is also present resulting in mild neural foraminal narrowing on the right at C4-C5. **This report has been created using voice recognition software. It may contain minor errors which are inherent in voice recognition technology. **    Final report electronically signed by Dr. Meka Carson on 11/8/2019 10:16 AM         Results for orders placed during the hospital encounter of 11/01/19    MRI BRAIN WO CONTRAST    Narrative  PROCEDURE: MRI BRAIN WO CONTRAST    CLINICAL INFORMATION Ataxia. COMPARISON: MRI of the brain dated July 21, 2008. TECHNIQUE: Multiplanar and multiple spin echo MRI images were obtained of the brain without contrast.    FINDINGS:    There is mild prominence of the sulcal spaces and ventricular system secondary to generalized, age-related parenchymal volume loss. Mild, scattered punctate foci of hyperintense T2 signal are present throughout the deep cerebral white matter.  These  appear more numerous compared to the prior exam. No restricted diffusion is identified within the brain. No abnormal susceptibility is present. The ventricles are similar in size and morphology without evidence of hydrocephalus. No mass, mass effect or extra-axial fluid collection is identified. The basal cisterns and visualized vascular flow voids are patent. The pituitary, brain stem and  cervical medullary junction are within normal limits. The visualized orbits and temporal bone structures are unremarkable. The paranasal sinuses are within normal limits. Impression  No acute intracranial abnormality is identified. Senescent changes are present and there has been interval increase in the amount of scattered, punctate foci of hyperintense T2 signal within the deep cerebral white matter. These likely correspond to  mild sequela of chronic microvascular ischemic angiopathy. **This report has been created using voice recognition software. It may contain minor errors which are inherent in voice recognition technology. **    Final report electronically signed by Dr. Madelyn Burns on 11/1/2019 4:22 PM              Assessment:     Diagnosis Orders   1. Right-sided face pain        2. Trigeminal neuralgia  carBAMazepine (TEGRETOL-XR) 100 MG extended release tablet           Follow up for right trigeminal neuralgia. She is on Tegretol 100 mg twice a day as needed . She is doing well. Tegretol helps with facial pain is better controlled. I shared with her that her lab work done 6/2022 checking viral titers and inflammatory markers showed no concerning findings. After detailed discussion with patient we agreed on the following plan. Plan:  Continue with Tegretol 100 mg twice a day. Refills given. Follow up in 6 months. Call if any questions or concerns.         Total time 22 min    LEFTY Hu - TIP

## 2022-12-19 NOTE — PATIENT INSTRUCTIONS
Continue with Tegretol 100 mg twice a day. Refills given. Follow up in 6 months. Call if any questions or concerns.

## 2023-02-13 DIAGNOSIS — E03.9 ACQUIRED HYPOTHYROIDISM: ICD-10-CM

## 2023-02-13 RX ORDER — LEVOTHYROXINE SODIUM 50 MCG
50 TABLET ORAL DAILY
Qty: 30 TABLET | Refills: 1 | Status: SHIPPED | OUTPATIENT
Start: 2023-02-13

## 2023-02-13 NOTE — TELEPHONE ENCOUNTER
Patient called stating she needs a [facility] refill on her synthroid.     Patient's last appointment was : 4/21/22  Patient's next appointment is :  4/10/23  Last refilled: 8/8/22  [facility] Verified    No results found for: LABA1C  Lab Results   Component Value Date    CHOL 256 (H) 12/17/2021    TRIG 172 12/17/2021    HDL 61 12/17/2021    LDLCALC 161 12/17/2021     Lab Results   Component Value Date     04/11/2022    K 4.4 04/11/2022     04/11/2022    CO2 29 04/11/2022    BUN 16 04/11/2022    CREATININE 0.9 04/11/2022    GLUCOSE 98 04/11/2022    CALCIUM 9.7 04/11/2022    PROT 6.7 04/11/2022    LABALBU 4.1 04/11/2022    BILITOT 0.4 04/11/2022    ALKPHOS 153 (H) 04/11/2022    AST 19 04/11/2022    ALT 13 04/11/2022    LABGLOM 62 (A) 04/11/2022     Lab Results   Component Value Date    TSH 1.280 12/17/2021    T4FREE 1.53 12/17/2021     Lab Results   Component Value Date    WBC 5.0 06/17/2022    HGB 12.2 06/17/2022    HCT 38.6 06/17/2022    MCV 91.7 06/17/2022     06/17/2022

## 2023-02-20 ENCOUNTER — OFFICE VISIT (OUTPATIENT)
Dept: CARDIOLOGY CLINIC | Age: 73
End: 2023-02-20
Payer: MEDICARE

## 2023-02-20 VITALS
HEART RATE: 74 BPM | HEIGHT: 66 IN | SYSTOLIC BLOOD PRESSURE: 121 MMHG | BODY MASS INDEX: 21.86 KG/M2 | WEIGHT: 136 LBS | DIASTOLIC BLOOD PRESSURE: 83 MMHG

## 2023-02-20 DIAGNOSIS — R00.2 INTERMITTENT PALPITATIONS: ICD-10-CM

## 2023-02-20 DIAGNOSIS — R42 DIZZINESS: ICD-10-CM

## 2023-02-20 DIAGNOSIS — I10 PRIMARY HYPERTENSION: Primary | ICD-10-CM

## 2023-02-20 DIAGNOSIS — E78.00 PURE HYPERCHOLESTEROLEMIA: ICD-10-CM

## 2023-02-20 PROCEDURE — G8420 CALC BMI NORM PARAMETERS: HCPCS | Performed by: INTERNAL MEDICINE

## 2023-02-20 PROCEDURE — 3079F DIAST BP 80-89 MM HG: CPT | Performed by: INTERNAL MEDICINE

## 2023-02-20 PROCEDURE — G8484 FLU IMMUNIZE NO ADMIN: HCPCS | Performed by: INTERNAL MEDICINE

## 2023-02-20 PROCEDURE — 93000 ELECTROCARDIOGRAM COMPLETE: CPT | Performed by: INTERNAL MEDICINE

## 2023-02-20 PROCEDURE — G8399 PT W/DXA RESULTS DOCUMENT: HCPCS | Performed by: INTERNAL MEDICINE

## 2023-02-20 PROCEDURE — 3074F SYST BP LT 130 MM HG: CPT | Performed by: INTERNAL MEDICINE

## 2023-02-20 PROCEDURE — 1123F ACP DISCUSS/DSCN MKR DOCD: CPT | Performed by: INTERNAL MEDICINE

## 2023-02-20 PROCEDURE — 3017F COLORECTAL CA SCREEN DOC REV: CPT | Performed by: INTERNAL MEDICINE

## 2023-02-20 PROCEDURE — 1036F TOBACCO NON-USER: CPT | Performed by: INTERNAL MEDICINE

## 2023-02-20 PROCEDURE — 1090F PRES/ABSN URINE INCON ASSESS: CPT | Performed by: INTERNAL MEDICINE

## 2023-02-20 PROCEDURE — G8427 DOCREV CUR MEDS BY ELIG CLIN: HCPCS | Performed by: INTERNAL MEDICINE

## 2023-02-20 PROCEDURE — 99214 OFFICE O/P EST MOD 30 MIN: CPT | Performed by: INTERNAL MEDICINE

## 2023-02-20 NOTE — PROGRESS NOTES
Chief Complaint   Patient presents with    1 Year Follow Up       Chiqui Sawant Last seen 04/20215 and came back in oc 2021 for Abnormal EKG, was done due to pre-surgery. For foot surgery            Patient here today for a 1 year follow up    EKG done today    Denied chest pain, sob, leg edema    Hx of occasional palpitation    No chest pain of long while    Sometimes dizziness when look up and moving around  Or turning    Denies  Edema    Some sob on exertion    Palpitation has for long time  Intermittent    Nonsmoker    FHX  Mother had afib and chf  Sister had mI at 64  Brother Valve replacement for RHD      Patient Active Problem List   Diagnosis    Hyperlipemia    PUD (peptic ulcer disease)    Osteopenia    Obstructive sleep apnea    Daytime somnolence    Restless sleeper    Fatigue    Difficulty sleeping    Morning headache    Hypertension    Arthritis    Seasonal allergies    Hypothyroidism    Intermittent palpitations    Dizziness for yrs on getting up- once in a while    GERD (gastroesophageal reflux disease)    Gait disturbance       Past Surgical History:   Procedure Laterality Date    BUNIONECTOMY  1991    rt    COLONOSCOPY  2005    Due 2014 Dr Naren Diaz    wisdom teeth extraction    HYSTERECTOMY (CERVIX STATUS UNKNOWN)  2001    KNEE ARTHROSCOPY  2004    lt meniscus repair     LAPAROSCOPY  1983    abd infection    SKIN CANCER EXCISION  07-17-13    Dr Mj Wolfe    FIDELINA AND BSO (CERVIX REMOVED)  2001       Allergies   Allergen Reactions    Amoxicillin Diarrhea    Darvon [Propoxyphene Hcl] Other (See Comments)     Feeling of extremity numbness. Seasonal       cat, grass, dust, mold    Sudafed [Pseudoephedrine Hcl] Other (See Comments)     Restless feeling.         Family History   Problem Relation Age of Onset    High Blood Pressure Father     Other Father 76        abd anyuerism    Heart Disease Father         abdominal aortic aneurysm    Thyroid Disease Mother 43    Other Mother 21 psoriasis    Heart Failure Mother 76    Heart Disease Mother 79        A fib     Breast Cancer Mother 61    Heart Disease Brother 6        rheumatic fever as child     Heart Disease Maternal Uncle         unknown type of disease     Pancreatic Cancer Paternal Aunt     Cancer Maternal Grandmother         sarcoma hip    Heart Disease Sister 64        CAD, s/p triple bypass    Diabetes Paternal Grandmother     [facility]    BRCA 2 Positive Niece 58 [facility]        POSITIVE    Pancreatic Cancer Paternal Aunt     Cancer Maternal Cousin         sarcoma of eye        Social History     Socioeconomic History    Marital status:      Spouse name: Not on file    Number of children: Not on file    Years of education: Not on file    Highest education level: Not on file   Occupational History    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[employer], graduated in    Tobacco Use    Smoking status: Former     Packs/day: 0.50     Years: 0.50     Pack years: 0.25     Types: Cigarettes     Quit date: 1973     Years since quittin.5    Smokeless tobacco: Never   Vaping Use    Vaping Use: Never used   Substance and Sexual Activity    Alcohol use: Not Currently     Alcohol/week: 0.0 standard drinks    Drug use: No    Sexual activity: Not Currently     Partners: Male   Other Topics Concern    Not on file   Social History Narrative    Not on file     Social Determinants of Health     Financial Resource Strain: Not on file   Food Insecurity: Not on file   Transportation Needs: Not on file   Physical Activity: Not on file   Stress: Not on file   Social Connections: Not on file   Intimate Partner Violence: Not on file   Housing Stability: Not on file       Current Outpatient Medications   Medication Sig Dispense Refill    SYNTHROID 50 MCG tablet Take 1 tablet by mouth Daily 30 tablet 1    NONFORMULARY Dynamic brain health      carBAMazepine (TEGRETOL-XR) 100 MG extended release tablet Take 1 tablet by mouth 2 times daily 180 tablet 1    lisinopril (PRINIVIL;ZESTRIL) 20 MG tablet Take 1 tablet by mouth daily 90 tablet 1    fluticasone (FLONASE) 50 MCG/ACT nasal spray 2 sprays by Nasal route daily 1 each 5    albuterol sulfate HFA (PROVENTIL HFA) 108 (90 Base) MCG/ACT inhaler Inhale 2 puffs into the lungs every 6 hours as needed for Wheezing 1 each 3    Baclofen (LIORESAL) 5 MG tablet Take 1 tablet by mouth every evening 90 tablet 1    FIBER PO Take by mouth      Menaquinone-7 (VITAMIN K2 PO) Take 150 mcg by mouth Daily      zinc 50 MG CAPS Take by mouth      MAGNESIUM PO Take 120 mg by mouth 2 times daily      Omega-3 Fatty Acids (FISH OIL PO) Take by mouth      acetaminophen (TYLENOL) 325 MG tablet Take 650 mg by mouth every 6 hours as needed for Pain      Coenzyme Q10 (COQ-10 PO) Take 1 tablet by mouth daily       aspirin 81 MG tablet Take 81 mg by mouth daily      calcium carbonate (OSCAL) 500 MG TABS tablet Take 500 mg by mouth daily      Cholecalciferol (D3 VITAMIN PO) Take 1,000 Units by mouth daily       vitamin C (ASCORBIC ACID) 500 MG tablet Take 500 mg by mouth daily      Krill Oil 500 MG CAPS Take 1 tablet by mouth daily      Loperamide HCl (IMODIUM PO) Take  by mouth daily as needed. No current facility-administered medications for this visit. Review of Systems -     General ROS: negative  Psychological ROS: negative  Hematological and Lymphatic ROS: No history of blood clots or bleeding disorder. Respiratory ROS: no cough, shortness of breath, or wheezing  Cardiovascular ROS: no chest pain or dyspnea on exertion  Gastrointestinal ROS: negative  Genito-Urinary ROS: no dysuria, trouble voiding, or hematuria  Musculoskeletal ROS: negative  Neurological ROS: no TIA or stroke symptoms  Dermatological ROS: negative      Blood pressure 121/83, pulse 74, height 5' 6\" (1.676 m), weight 136 lb (61.7 kg).         Physical Examination:    General appearance - alert, well appearing, and in no distress  Mental status - alert, oriented to person, place, and time  Neck - supple, no significant adenopathy, no JVD, or carotid bruits  Chest - clear to auscultation, no wheezes, rales or rhonchi, symmetric air entry  Heart - normal rate, regular rhythm, normal S1, S2, no murmurs, rubs, clicks or gallops  Abdomen - soft, nontender, nondistended, no masses or organomegaly  Neurological - alert, oriented, normal speech, no focal findings or movement disorder noted  Musculoskeletal - no joint tenderness, deformity or swelling  Extremities - peripheral pulses normal, no pedal edema, no clubbing or cyanosis  Skin - normal coloration and turgor, no rashes, no suspicious skin lesions noted    Lab  No results for input(s): CKTOTAL, CKMB, CKMBINDEX, TROPONINI in the last 72 hours.   CBC:   Lab Results   Component Value Date/Time    WBC 5.0 06/17/2022 11:38 AM    WBC 7.9 04/11/2022 10:01 AM    RBC 4.21 06/17/2022 11:38 AM    HGB 12.2 06/17/2022 11:38 AM    HCT 38.6 06/17/2022 11:38 AM    MCV 91.7 06/17/2022 11:38 AM    MCH 29.0 06/17/2022 11:38 AM    MCHC 31.6 06/17/2022 11:38 AM    RDW 13.2 06/17/2022 11:38 AM     06/17/2022 11:38 AM     04/11/2022 10:01 AM    MPV 9.9 06/17/2022 11:38 AM     BMP:    Lab Results   Component Value Date/Time     04/11/2022 10:01 AM    K 4.4 04/11/2022 10:01 AM     04/11/2022 10:01 AM    CO2 29 04/11/2022 10:01 AM    BUN 16 04/11/2022 10:01 AM    LABALBU 4.1 04/11/2022 10:01 AM    CREATININE 0.9 04/11/2022 10:01 AM    CALCIUM 9.7 04/11/2022 10:01 AM    LABGLOM 62 04/11/2022 10:01 AM    GLUCOSE 98 04/11/2022 10:01 AM     Hepatic Function Panel:    Lab Results   Component Value Date/Time    ALKPHOS 153 04/11/2022 10:01 AM    ALT 13 04/11/2022 10:01 AM    AST 19 04/11/2022 10:01 AM    PROT 6.7 04/11/2022 10:01 AM    BILITOT 0.4 04/11/2022 10:01 AM    BILIDIR <0.2 09/02/2021 08:56 AM    LABALBU 4.1 04/11/2022 10:01 AM     Magnesium:    Lab Results   Component Value Date/Time    MG 2.0 10/13/2020 10:11 AM Warfarin PT/INR:    No components found for: PTPATWAR,  PTINRWAR  HgBA1c:    No results found for: LABA1C  FLP:    Lab Results   Component Value Date/Time    TRIG 172 12/17/2021 10:37 AM    HDL 61 12/17/2021 10:37 AM    LDLCALC 161 12/17/2021 10:37 AM     TSH:    Lab Results   Component Value Date/Time    TSH 1.280 12/17/2021 10:37 AM     EKG-1/13/15-Normal sinus rhythm  Normal ECG  When compared with ECG of 08-JUL-2008 01:12,  No significant change was found  Confirmed by Akanksha Chen ([id number]) on 12/27/2014 2:44:40 PM    [facility] EF 60% dec   Nuc stress test Okay dec 2017          Conclusions      Summary   Normal left ventricle size and systolic function. Ejection fraction was   estimated at 60 %. There were no regional left ventricular wall motion   abnormalities and wall thickness was within normal limits. Doppler parameters were consistent with abnormal left ventricular   relaxation (grade 1 diastolic dysfunction). Signature      ----------------------------------------------------------------   Electronically signed by Little Schwarz MD (Interpreting   physician) on 11/03/2021 at 11:45 AM   ----------------------------------------------------------------       Conclusions    Summary   Lexiscan EKG stress test is not suggestive for ischemia. Nonspecific ST-T wave changes. This Nuclear Medicine study was negative for ischemia .       Signatures      ----------------------------------------------------------------   Electronically signed by Little Schwarz MD (Interpreting   Cardiologist) on 11/03/2021 at 18:03   ----------------------------------------------------------------      DIARY * Symptom Correlates with  Normal sinus rhythm and rate     CONCLUSION:  *     Normal Sinus rhythm    Average Heart Rate 78 BPM Range from  62 bpm to  129 bpm Rare Premature atrial complexes -181 pacs  Rare supraventricular ectopic runs/atrial runs/ longest 5 beats at rate 139 bpm and fastest was 4 beats at rate 173 bpm  Rare Premature ventricular complexes -2  No long pause or profound bradycardia  No Supraventricular Tachycardia   No Atrial Fibrillation       Confirmed by Jaime White MD, Sangeeta Phillips ([id number]) on 2021 5:30:59 PM      CONCLUSION:  1. Sinus rhythm. 2. Occasional paroxysmal atrial contractions, rare premature ventricular  contractions. 3. No ventricular tachycardia, supraventricular tachycardia or pauses. Tracy Wiseman M.D.  D: 2014 11:41     ekg 10/21/21  Normal sinus rhythm Nonspecific ST and T wave abnormality Abnormal ECG When compared with ECG of 26-DEC-2014 14:47, Nonspecific T wave abnormality is worse in Anterior leads QT has shortened    Ekg 23  Sinus  Rhythm   -  Nonspecific T-abnormality. ABNORMAL     Assessment  FHX of CAD- sister had MI at 64 , brother had Valve surgery, and stent, Father AAA  from rupture     Diagnosis Orders   1. Primary hypertension  EKG 12 lead      2. Intermittent palpitations  EKG 12 lead      3. Pure hypercholesterolemia        4. Dizziness for yrs on getting up- once in a while            Plan     The current meds and labs reviewed    Continue the current treatment and with constant vigilance to changes in symptoms and also any potential side effects. Return for care or seek medical attention immediately if symptoms got worse and/or develop new symptoms. Hypertension, on medical treatment. Seems to be under good control. Patient is compliant with medical treatment. Hyperlipidemia: on med rx and LSM  Could not tolerate statin  On diet and exercise    Pat stated fish oil helped her palpitation  Kirl oil helped her for her artheritis per pat    Hx of Dizziness- nonspecific  Bedside ortho negative  Cont asa 81  Echo and Darci nuc- WNL  Holter 48 hrs- WNL    Get LP and LFT next visit and consider PCSK 9 if high    D/w the pat the plan of  A nd finding of the test    Discussed use, benefit, and side effects of prescribed medications.  All patient questions answered. Pt voiced understanding. Instructed to continue current medications, diet and exercise. Continue risk factor modification and medical management. Patient agreed with treatment plan. Follow up as directed.     RTC in 1 year      Jah Ariza, [facility]

## 2023-02-24 RX ORDER — LEVOTHYROXINE SODIUM 50 MCG
50 TABLET ORAL DAILY
Qty: 30 TABLET | Refills: 0 | OUTPATIENT
Start: 2023-02-24

## 2023-02-24 NOTE — TELEPHONE ENCOUNTER
MD Adelaida Bergeron called requesting a 30 day supply of Synthroid called into [facility] on [address] which is pended. Due to she only has 1 tablet left and Adelaida Roque called [facility] and they told her it can take up to 3 weeks for her medication to arrive. Please Advise.

## 2023-02-27 DIAGNOSIS — E03.9 ACQUIRED HYPOTHYROIDISM: ICD-10-CM

## 2023-02-27 RX ORDER — LEVOTHYROXINE SODIUM 50 MCG
50 TABLET ORAL DAILY
Qty: 7 TABLET | Refills: 0 | Status: SHIPPED | OUTPATIENT
Start: 2023-02-27 | End: 2023-03-06

## 2023-02-27 NOTE — TELEPHONE ENCOUNTER
Patient needs 7 day supply of her synthroid sent to [facility] on [address].  She has been out a few days and mail order is not coming for a few days

## 2023-04-03 ENCOUNTER — NURSE ONLY (OUTPATIENT)
Dept: LAB | Age: 73
End: 2023-04-03

## 2023-04-03 DIAGNOSIS — E03.9 ACQUIRED HYPOTHYROIDISM: ICD-10-CM

## 2023-04-03 DIAGNOSIS — I10 PRIMARY HYPERTENSION: ICD-10-CM

## 2023-04-03 DIAGNOSIS — E78.00 HYPERCHOLESTEROLEMIA: ICD-10-CM

## 2023-04-03 DIAGNOSIS — M81.0 OSTEOPOROSIS, UNSPECIFIED OSTEOPOROSIS TYPE, UNSPECIFIED PATHOLOGICAL FRACTURE PRESENCE: ICD-10-CM

## 2023-04-03 LAB
25(OH)D3 SERPL-MCNC: 55 NG/ML (ref 30–100)
ALBUMIN SERPL BCG-MCNC: 4.3 G/DL (ref 3.5–5.1)
ALP SERPL-CCNC: 134 U/L (ref 38–126)
ALT SERPL W/O P-5'-P-CCNC: 19 U/L (ref 11–66)
ANION GAP SERPL CALC-SCNC: 11 MEQ/L (ref 8–16)
AST SERPL-CCNC: 27 U/L (ref 5–40)
BACTERIA: ABNORMAL
BASOPHILS ABSOLUTE: 0.1 THOU/MM3 (ref 0–0.1)
BASOPHILS NFR BLD AUTO: 1.4 %
BILIRUB SERPL-MCNC: 0.5 MG/DL (ref 0.3–1.2)
BILIRUB UR QL STRIP: NEGATIVE
BUN SERPL-MCNC: 12 MG/DL (ref 7–22)
CALCIUM SERPL-MCNC: 9.9 MG/DL (ref 8.5–10.5)
CASTS #/AREA URNS LPF: ABNORMAL /LPF
CASTS #/AREA URNS LPF: ABNORMAL /LPF
CHARACTER UR: ABNORMAL
CHARCOAL URNS QL MICRO: ABNORMAL
CHLORIDE SERPL-SCNC: 101 MEQ/L (ref 98–111)
CHOLESTEROL, FASTING: 242 MG/DL (ref 100–199)
CO2 SERPL-SCNC: 29 MEQ/L (ref 23–33)
COLOR UR: YELLOW
CREAT SERPL-MCNC: 0.9 MG/DL (ref 0.4–1.2)
CRYSTALS URNS QL MICRO: ABNORMAL
DEPRECATED RDW RBC AUTO: 45.2 FL (ref 35–45)
EOSINOPHIL NFR BLD AUTO: 3.2 %
EOSINOPHILS ABSOLUTE: 0.2 THOU/MM3 (ref 0–0.4)
EPITHELIAL CELLS, UA: ABNORMAL /HPF
ERYTHROCYTE [DISTWIDTH] IN BLOOD BY AUTOMATED COUNT: 13.4 % (ref 11.5–14.5)
GFR SERPL CREATININE-BSD FRML MDRD: > 60 ML/MIN/1.73M2
GLUCOSE SERPL-MCNC: 93 MG/DL (ref 70–108)
GLUCOSE UR QL STRIP.AUTO: NEGATIVE MG/DL
HCT VFR BLD AUTO: 43 % (ref 37–47)
HDLC SERPL-MCNC: 66 MG/DL
HGB BLD-MCNC: 13.7 GM/DL (ref 12–16)
HGB UR QL STRIP.AUTO: NEGATIVE
IMM GRANULOCYTES # BLD AUTO: 0.01 THOU/MM3 (ref 0–0.07)
IMM GRANULOCYTES NFR BLD AUTO: 0.2 %
KETONES UR QL STRIP.AUTO: NEGATIVE
LDLC SERPL CALC-MCNC: 149 MG/DL
LEUKOCYTE ESTERASE UR QL STRIP.AUTO: ABNORMAL
LYMPHOCYTES ABSOLUTE: 1.7 THOU/MM3 (ref 1–4.8)
LYMPHOCYTES NFR BLD AUTO: 30.7 %
MCH RBC QN AUTO: 29.5 PG (ref 26–33)
MCHC RBC AUTO-ENTMCNC: 31.9 GM/DL (ref 32.2–35.5)
MCV RBC AUTO: 92.7 FL (ref 81–99)
MONOCYTES ABSOLUTE: 0.5 THOU/MM3 (ref 0.4–1.3)
MONOCYTES NFR BLD AUTO: 9.5 %
NEUTROPHILS NFR BLD AUTO: 55 %
NITRITE UR QL STRIP.AUTO: NEGATIVE
NRBC BLD AUTO-RTO: 0 /100 WBC
PH UR STRIP.AUTO: 8.5 [PH] (ref 5–9)
PLATELET # BLD AUTO: 358 THOU/MM3 (ref 130–400)
PMV BLD AUTO: 9.5 FL (ref 9.4–12.4)
POTASSIUM SERPL-SCNC: 3.7 MEQ/L (ref 3.5–5.2)
PROT SERPL-MCNC: 7.6 G/DL (ref 6.1–8)
PROT UR STRIP.AUTO-MCNC: NEGATIVE MG/DL
RBC # BLD AUTO: 4.64 MILL/MM3 (ref 4.2–5.4)
RBC #/AREA URNS HPF: ABNORMAL /HPF
RENAL EPI CELLS #/AREA URNS HPF: ABNORMAL /[HPF]
SEGMENTED NEUTROPHILS ABSOLUTE COUNT: 3.1 THOU/MM3 (ref 1.8–7.7)
SODIUM SERPL-SCNC: 141 MEQ/L (ref 135–145)
SPECIFIC GRAVITY UA: 1.01 (ref 1–1.03)
TRIGLYCERIDE, FASTING: 133 MG/DL (ref 0–199)
TSH SERPL DL<=0.005 MIU/L-ACNC: 3.21 UIU/ML (ref 0.4–4.2)
UROBILINOGEN, URINE: 0.2 EU/DL (ref 0–1)
WBC # BLD AUTO: 5.7 THOU/MM3 (ref 4.8–10.8)
WBC #/AREA URNS HPF: ABNORMAL /HPF
YEAST LIKE FUNGI URNS QL MICRO: ABNORMAL

## 2023-05-25 ENCOUNTER — HOSPITAL ENCOUNTER (OUTPATIENT)
Dept: WOMENS IMAGING | Age: 73
Discharge: HOME | End: 2023-05-25
Payer: MEDICARE

## 2023-05-25 DIAGNOSIS — Z12.31 BREAST CANCER SCREENING BY MAMMOGRAM: ICD-10-CM

## 2023-05-25 PROCEDURE — 77063 BREAST TOMOSYNTHESIS BI: CPT

## 2023-07-28 ENCOUNTER — OFFICE VISIT (OUTPATIENT)
Dept: NEUROLOGY | Age: 73
End: 2023-07-28
Payer: MEDICARE

## 2023-07-28 VITALS
WEIGHT: 137 LBS | HEART RATE: 62 BPM | DIASTOLIC BLOOD PRESSURE: 70 MMHG | SYSTOLIC BLOOD PRESSURE: 110 MMHG | OXYGEN SATURATION: 97 % | HEIGHT: 67 IN | BODY MASS INDEX: 21.5 KG/M2

## 2023-07-28 DIAGNOSIS — G50.0 TRIGEMINAL NEURALGIA: Primary | ICD-10-CM

## 2023-07-28 DIAGNOSIS — R51.9 RIGHT-SIDED FACE PAIN: ICD-10-CM

## 2023-07-28 PROCEDURE — 1036F TOBACCO NON-USER: CPT | Performed by: PSYCHIATRY & NEUROLOGY

## 2023-07-28 PROCEDURE — 1090F PRES/ABSN URINE INCON ASSESS: CPT | Performed by: PSYCHIATRY & NEUROLOGY

## 2023-07-28 PROCEDURE — 99213 OFFICE O/P EST LOW 20 MIN: CPT | Performed by: PSYCHIATRY & NEUROLOGY

## 2023-07-28 PROCEDURE — 3074F SYST BP LT 130 MM HG: CPT | Performed by: PSYCHIATRY & NEUROLOGY

## 2023-07-28 PROCEDURE — 3078F DIAST BP <80 MM HG: CPT | Performed by: PSYCHIATRY & NEUROLOGY

## 2023-07-28 PROCEDURE — G8399 PT W/DXA RESULTS DOCUMENT: HCPCS | Performed by: PSYCHIATRY & NEUROLOGY

## 2023-07-28 PROCEDURE — G8420 CALC BMI NORM PARAMETERS: HCPCS | Performed by: PSYCHIATRY & NEUROLOGY

## 2023-07-28 PROCEDURE — 1123F ACP DISCUSS/DSCN MKR DOCD: CPT | Performed by: PSYCHIATRY & NEUROLOGY

## 2023-07-28 PROCEDURE — G8427 DOCREV CUR MEDS BY ELIG CLIN: HCPCS | Performed by: PSYCHIATRY & NEUROLOGY

## 2023-07-28 PROCEDURE — 3017F COLORECTAL CA SCREEN DOC REV: CPT | Performed by: PSYCHIATRY & NEUROLOGY

## 2023-07-28 NOTE — PROGRESS NOTES
patent. No suspicious finding is identified within the visualized paraspinal soft tissues. Impression  Multilevel degenerative changes are present throughout the cervical spine and are further discussed by level in the findings. These are most significant at C5-C6 where there is a disc osteophyte complex causing mild spinal canal narrowing. Degenerative  facet arthropathy and uncovertebral joint spurring is also present resulting in mild neural foraminal narrowing on the right at C4-C5. **This report has been created using voice recognition software. It may contain minor errors which are inherent in voice recognition technology. **  Final report electronically signed by Dr. Shirley Blum on 11/8/2019 10:16 AM         MRI BRAIN WO CONTRAST  Narrative  PROCEDURE: MRI BRAIN WO CONTRAST  CLINICAL INFORMATION Ataxia. COMPARISON: MRI of the brain dated July 21, 2008. TECHNIQUE: Multiplanar and multiple spin echo MRI images were obtained of the brain without contrast.  FINDINGS:  There is mild prominence of the sulcal spaces and ventricular system secondary to generalized, age-related parenchymal volume loss. Mild, scattered punctate foci of hyperintense T2 signal are present throughout the deep cerebral white matter. These  appear more numerous compared to the prior exam. No restricted diffusion is identified within the brain. No abnormal susceptibility is present. The ventricles are similar in size and morphology without evidence of hydrocephalus. No mass, mass effect or extra-axial fluid collection is identified. The basal cisterns and visualized vascular flow voids are patent. The pituitary, brain stem and  cervical medullary junction are within normal limits. The visualized orbits and temporal bone structures are unremarkable. The paranasal sinuses are within normal limits. Impression  No acute intracranial abnormality is identified.  Senescent changes are present and there has been interval increase in

## 2023-10-02 DIAGNOSIS — E03.9 ACQUIRED HYPOTHYROIDISM: ICD-10-CM

## 2023-10-04 RX ORDER — LEVOTHYROXINE SODIUM 50 MCG
50 TABLET ORAL DAILY
Qty: 30 TABLET | Refills: 0 | Status: SHIPPED | OUTPATIENT
Start: 2023-10-04 | End: 2023-10-11 | Stop reason: SDUPTHER

## 2023-10-04 NOTE — TELEPHONE ENCOUNTER
Patient's last appointment was : 4/10/2023 with our Diamante Tobar  Patient's next appointment is : 10/16/2023 with our Dr. Nat Rosa  Last refilled on: 7/28/2023  Which pharmacy does the script need sent to: [place]      No results found for: \"LABA1C\"  Lab Results   Component Value Date    CHOL 256 (H) 12/17/2021    TRIG 172 12/17/2021    HDL 66 04/03/2023    LDLCALC 149 04/03/2023     Lab Results   Component Value Date     04/03/2023    K 3.7 04/03/2023     04/03/2023    CO2 29 04/03/2023    BUN 12 04/03/2023    CREATININE 0.9 04/03/2023    GLUCOSE 93 04/03/2023    CALCIUM 9.9 04/03/2023    PROT 7.6 04/03/2023    LABALBU 4.3 04/03/2023    BILITOT 0.5 04/03/2023    ALKPHOS 134 (H) 04/03/2023    AST 27 04/03/2023    ALT 19 04/03/2023    LABGLOM >60 04/03/2023     Lab Results   Component Value Date    TSH 3.210 04/03/2023    T4FREE 1.53 12/17/2021     Lab Results   Component Value Date    WBC 5.7 04/03/2023    HGB 13.7 04/03/2023    HCT 43.0 04/03/2023    MCV 92.7 04/03/2023     04/03/2023

## 2023-10-10 DIAGNOSIS — E03.9 ACQUIRED HYPOTHYROIDISM: ICD-10-CM

## 2023-10-11 DIAGNOSIS — E03.9 ACQUIRED HYPOTHYROIDISM: ICD-10-CM

## 2023-10-11 RX ORDER — LEVOTHYROXINE SODIUM 50 MCG
50 TABLET ORAL DAILY
Qty: 90 TABLET | Refills: 1 | OUTPATIENT
Start: 2023-10-11

## 2023-10-11 RX ORDER — LEVOTHYROXINE SODIUM 50 MCG
50 TABLET ORAL DAILY
Qty: 90 TABLET | Refills: 1 | Status: SHIPPED | OUTPATIENT
Start: 2023-10-11 | End: 2024-10-05

## 2023-10-11 NOTE — TELEPHONE ENCOUNTER
Called pt, no answer, left voicemail.  Informed her that she has refill at pharmacy already per HIPAA

## 2023-10-11 NOTE — TELEPHONE ENCOUNTER
Patient called in asking for refills of her synthroid for 1 year as she always gets to [address] Patient explains that it takes 21 days from them receiving the refill request to get them to patient so doing 30 day fills is inconvenient for her and costly in the long run.

## 2023-10-31 DIAGNOSIS — M19.90 ARTHRITIS: Primary | ICD-10-CM

## 2023-10-31 DIAGNOSIS — E03.9 ACQUIRED HYPOTHYROIDISM: ICD-10-CM

## 2023-10-31 DIAGNOSIS — I10 PRIMARY HYPERTENSION: ICD-10-CM

## 2023-10-31 RX ORDER — BACLOFEN 5 MG/1
5 TABLET ORAL EVERY EVENING
Qty: 90 TABLET | Refills: 0 | Status: SHIPPED | OUTPATIENT
Start: 2023-10-31

## 2023-10-31 RX ORDER — LISINOPRIL 20 MG/1
20 TABLET ORAL DAILY
Qty: 90 TABLET | Refills: 0 | Status: SHIPPED | OUTPATIENT
Start: 2023-10-31

## 2023-10-31 NOTE — TELEPHONE ENCOUNTER
Patient's last appointment was : 4/10/2023 with our DrKristie4/10/2023  Patient's next appointment is : Visit date not found   Last refilled on: 7/10/2023  Which pharmacy does the script need sent to: [facility]      No results found for: \"LABA1C\"  Lab Results   Component Value Date    CHOL 256 (H) 12/17/2021    TRIG 172 12/17/2021    HDL 66 04/03/2023    LDLCALC 149 04/03/2023     Lab Results   Component Value Date     04/03/2023    K 3.7 04/03/2023     04/03/2023    CO2 29 04/03/2023    BUN 12 04/03/2023    CREATININE 0.9 04/03/2023    GLUCOSE 93 04/03/2023    CALCIUM 9.9 04/03/2023    PROT 7.6 04/03/2023    LABALBU 4.3 04/03/2023    BILITOT 0.5 04/03/2023    ALKPHOS 134 (H) 04/03/2023    AST 27 04/03/2023    ALT 19 04/03/2023    LABGLOM >60 04/03/2023     Lab Results   Component Value Date    TSH 3.210 04/03/2023    T4FREE 1.53 12/17/2021     Lab Results   Component Value Date    WBC 5.7 04/03/2023    HGB 13.7 04/03/2023    HCT 43.0 04/03/2023    MCV 92.7 04/03/2023     04/03/2023

## 2023-10-31 NOTE — TELEPHONE ENCOUNTER
Needs follow up before the next refill in 3 months.   Please make her an appointment with one of out residents

## 2023-11-17 ENCOUNTER — HOSPITAL ENCOUNTER (EMERGENCY)
Age: 73
Discharge: HOME | End: 2023-11-17
Payer: MEDICARE

## 2023-11-17 VITALS
HEART RATE: 73 BPM | RESPIRATION RATE: 16 BRPM | HEIGHT: 66 IN | DIASTOLIC BLOOD PRESSURE: 78 MMHG | TEMPERATURE: 97.4 F | SYSTOLIC BLOOD PRESSURE: 134 MMHG | BODY MASS INDEX: 22.5 KG/M2 | WEIGHT: 140 LBS | OXYGEN SATURATION: 96 %

## 2023-11-17 DIAGNOSIS — N30.01 ACUTE CYSTITIS WITH HEMATURIA: Primary | ICD-10-CM

## 2023-11-17 LAB
BILIRUB UR STRIP.AUTO-MCNC: NEGATIVE MG/DL
CHARACTER UR: CLEAR
COLOR: YELLOW
GLUCOSE UR QL STRIP.AUTO: NEGATIVE MG/DL
KETONES UR QL STRIP.AUTO: NEGATIVE
NITRITE UR QL STRIP.AUTO: NEGATIVE
PH UR STRIP.AUTO: 7.5 [PH] (ref 5–9)
PROT UR STRIP.AUTO-MCNC: NEGATIVE MG/DL
RBC #/AREA URNS HPF: NEGATIVE /[HPF]
SP GR UR STRIP.AUTO: 1.02 (ref 1–1.03)
UROBILINOGEN, URINE: 0.2 EU/DL (ref 0.2–1)
WBC #/AREA URNS HPF: ABNORMAL /[HPF]

## 2023-11-17 PROCEDURE — 87077 CULTURE AEROBIC IDENTIFY: CPT

## 2023-11-17 PROCEDURE — 87086 URINE CULTURE/COLONY COUNT: CPT

## 2023-11-17 PROCEDURE — 99213 OFFICE O/P EST LOW 20 MIN: CPT

## 2023-11-17 PROCEDURE — 81003 URINALYSIS AUTO W/O SCOPE: CPT

## 2023-11-17 PROCEDURE — 99213 OFFICE O/P EST LOW 20 MIN: CPT | Performed by: NURSE PRACTITIONER

## 2023-11-17 PROCEDURE — 87186 SC STD MICRODIL/AGAR DIL: CPT

## 2023-11-17 RX ORDER — NITROFURANTOIN 25; 75 MG/1; MG/1
100 CAPSULE ORAL 2 TIMES DAILY
Qty: 10 CAPSULE | Refills: 0 | Status: SHIPPED | OUTPATIENT
Start: 2023-11-17 | End: 2023-11-22

## 2023-11-17 ASSESSMENT — ENCOUNTER SYMPTOMS
VOMITING: 0
NAUSEA: 0
SHORTNESS OF BREATH: 0
COUGH: 0

## 2023-11-17 ASSESSMENT — PAIN DESCRIPTION - DESCRIPTORS: DESCRIPTORS: BURNING

## 2023-11-17 ASSESSMENT — PAIN DESCRIPTION - ORIENTATION: ORIENTATION: LOWER

## 2023-11-17 ASSESSMENT — PAIN DESCRIPTION - LOCATION: LOCATION: ABDOMEN

## 2023-11-17 ASSESSMENT — PAIN - FUNCTIONAL ASSESSMENT: PAIN_FUNCTIONAL_ASSESSMENT: 0-10

## 2023-11-17 NOTE — ED PROVIDER NOTES
[address]  Urgent Care Encounter       CHIEF COMPLAINT       Chief Complaint   Patient presents with    Urinary Pain       Nurses Notes reviewed and I agree except as noted in the HPI. HISTORY OF PRESENT ILLNESS   Galo Siena is a 72 y.o. female who presents for evaluation of dysuria and urinary frequency that began yesterday and has continued into today. Patient denies any fever, chills, nausea, vomiting or any severe abdominal or back pain. She denies any medications or interventions at home. The history is provided by the patient. REVIEW OF SYSTEMS     Review of Systems   Constitutional:  Negative for chills and fever. Respiratory:  Negative for cough and shortness of breath. Cardiovascular:  Negative for chest pain. Gastrointestinal:  Negative for nausea and vomiting. Genitourinary:  Positive for dysuria and frequency. Musculoskeletal:  Negative for arthralgias and myalgias. Skin:  Negative for rash. Neurological:  Negative for headaches. PAST MEDICAL HISTORY         Diagnosis Date    Allergic rhinitis     Arthritis     left thumb    GERD (gastroesophageal reflux disease)     HTN (hypertension) January 2013    Hyperlipemia 2004    Hypothyroid 2009    Levothyroxine    IBS (irritable bowel syndrome)     Knee cap dislocation     broke right knee    Metatarsal fracture     left    Multilevel degenerative disc disease 2009    Takes Juanell Nose off and on as needed    Osteoarthritis 1982    Osteopenia 2008    PUD (peptic ulcer disease) 2009    Recurrent low back pain 1992    disc disease       SURGICALHISTORY     Patient  has a past surgical history that includes Dental surgery (01/01/1975); Bunionectomy (01/01/1991); Total abdominal hysterectomy w/ bilateral salpingoophorectomy (Bilateral, 01/01/2001); laparoscopy (01/01/1983); Colonoscopy (01/01/2005); Knee arthroscopy (01/01/2004); Skin cancer excision (07/17/2013); and Hysterectomy (01/01/2001).     CURRENT cystitis with hematuria          DISPOSITION/ PLAN     Urine sample is consistent with a urinary tract infection. I discussed with the patient the plan to treat with oral antibiotics. Patient is advised to remain hydrated and use over-the-counter Tylenol/ibuprofen as needed. Advised to follow-up with PCP on an outpatient basis if symptoms do not improve or seem to worsen and is agreeable to plan as discussed.        PATIENT REFERRED TO:  Jennifer Alcala MD  [address] / [facility] [id number]      DISCHARGE MEDICATIONS:  New Prescriptions    NITROFURANTOIN, MACROCRYSTAL-MONOHYDRATE, (MACROBID) 100 MG CAPSULE    Take 1 capsule by mouth 2 times daily for 5 days       Discontinued Medications    No medications on file       Current Discharge Medication List          Sabi Ventura, APRN - CNP    (Please note that portions of this note were completed with a voice recognition program. Efforts were made to edit the dictations but occasionally words are mis-transcribed.)           Sabi Ventura, APRN - CNP  11/17/23 1731

## 2023-11-19 LAB
BACTERIA UR CULT: ABNORMAL
ORGANISM: ABNORMAL

## 2023-11-20 LAB
BACTERIA UR CULT: ABNORMAL
ORGANISM: ABNORMAL

## 2024-01-26 ENCOUNTER — NURSE ONLY (OUTPATIENT)
Dept: LAB | Age: 74
End: 2024-01-26

## 2024-01-26 LAB
ALBUMIN SERPL BCG-MCNC: 4.5 G/DL (ref 3.5–5.1)
ALP SERPL-CCNC: 153 U/L (ref 38–126)
ALT SERPL W/O P-5'-P-CCNC: 14 U/L (ref 11–66)
ANION GAP SERPL CALC-SCNC: 15 MEQ/L (ref 8–16)
AST SERPL-CCNC: 19 U/L (ref 5–40)
BASOPHILS ABSOLUTE: 0.1 THOU/MM3 (ref 0–0.1)
BASOPHILS NFR BLD AUTO: 1.4 %
BILIRUB SERPL-MCNC: 0.6 MG/DL (ref 0.3–1.2)
BUN SERPL-MCNC: 13 MG/DL (ref 7–22)
CALCIUM SERPL-MCNC: 9.8 MG/DL (ref 8.5–10.5)
CHLORIDE SERPL-SCNC: 101 MEQ/L (ref 98–111)
CHOLEST SERPL-MCNC: 234 MG/DL (ref 100–199)
CO2 SERPL-SCNC: 26 MEQ/L (ref 23–33)
CREAT SERPL-MCNC: 1 MG/DL (ref 0.4–1.2)
CREAT UR-MCNC: 216.2 MG/DL
DEPRECATED RDW RBC AUTO: 44.6 FL (ref 35–45)
EOSINOPHIL NFR BLD AUTO: 1.1 %
EOSINOPHILS ABSOLUTE: 0.1 THOU/MM3 (ref 0–0.4)
ERYTHROCYTE [DISTWIDTH] IN BLOOD BY AUTOMATED COUNT: 13.2 % (ref 11.5–14.5)
GFR SERPL CREATININE-BSD FRML MDRD: 60 ML/MIN/1.73M2
GLUCOSE SERPL-MCNC: 99 MG/DL (ref 70–108)
HCT VFR BLD AUTO: 40.2 % (ref 37–47)
HDLC SERPL-MCNC: 64 MG/DL
HGB BLD-MCNC: 13 GM/DL (ref 12–16)
IMM GRANULOCYTES # BLD AUTO: 0.01 THOU/MM3 (ref 0–0.07)
IMM GRANULOCYTES NFR BLD AUTO: 0.2 %
LDLC SERPL CALC-MCNC: 144 MG/DL
LYMPHOCYTES ABSOLUTE: 1.8 THOU/MM3 (ref 1–4.8)
LYMPHOCYTES NFR BLD AUTO: 26.7 %
MCH RBC QN AUTO: 29.7 PG (ref 26–33)
MCHC RBC AUTO-ENTMCNC: 32.3 GM/DL (ref 32.2–35.5)
MCV RBC AUTO: 92 FL (ref 81–99)
MICROALBUMIN UR-MCNC: 1.23 MG/DL
MICROALBUMIN/CREAT RATIO PNL UR: 6 MG/G (ref 0–30)
MONOCYTES ABSOLUTE: 0.5 THOU/MM3 (ref 0.4–1.3)
MONOCYTES NFR BLD AUTO: 7.1 %
NEUTROPHILS NFR BLD AUTO: 63.5 %
NRBC BLD AUTO-RTO: 0 /100 WBC
PLATELET # BLD AUTO: 340 THOU/MM3 (ref 130–400)
PMV BLD AUTO: 9.6 FL (ref 9.4–12.4)
POTASSIUM SERPL-SCNC: 3.9 MEQ/L (ref 3.5–5.2)
PROT SERPL-MCNC: 7.5 G/DL (ref 6.1–8)
RBC # BLD AUTO: 4.37 MILL/MM3 (ref 4.2–5.4)
SEGMENTED NEUTROPHILS ABSOLUTE COUNT: 4.2 THOU/MM3 (ref 1.8–7.7)
SODIUM SERPL-SCNC: 142 MEQ/L (ref 135–145)
TRIGL SERPL-MCNC: 128 MG/DL (ref 0–199)
TSH SERPL DL<=0.005 MIU/L-ACNC: 1.89 UIU/ML (ref 0.4–4.2)
WBC # BLD AUTO: 6.6 THOU/MM3 (ref 4.8–10.8)

## 2024-02-20 ENCOUNTER — OFFICE VISIT (OUTPATIENT)
Dept: CARDIOLOGY CLINIC | Age: 74
End: 2024-02-20
Payer: MEDICARE

## 2024-02-20 VITALS
BODY MASS INDEX: 22.51 KG/M2 | WEIGHT: 143.4 LBS | HEIGHT: 67 IN | DIASTOLIC BLOOD PRESSURE: 80 MMHG | HEART RATE: 63 BPM | SYSTOLIC BLOOD PRESSURE: 115 MMHG

## 2024-02-20 DIAGNOSIS — R06.02 SOB (SHORTNESS OF BREATH) ON EXERTION: ICD-10-CM

## 2024-02-20 DIAGNOSIS — E78.00 PURE HYPERCHOLESTEROLEMIA: ICD-10-CM

## 2024-02-20 DIAGNOSIS — I10 PRIMARY HYPERTENSION: Primary | ICD-10-CM

## 2024-02-20 DIAGNOSIS — R42 DIZZINESS: ICD-10-CM

## 2024-02-20 DIAGNOSIS — R00.2 INTERMITTENT PALPITATIONS: ICD-10-CM

## 2024-02-20 PROCEDURE — 3079F DIAST BP 80-89 MM HG: CPT | Performed by: INTERNAL MEDICINE

## 2024-02-20 PROCEDURE — 3017F COLORECTAL CA SCREEN DOC REV: CPT | Performed by: INTERNAL MEDICINE

## 2024-02-20 PROCEDURE — 1036F TOBACCO NON-USER: CPT | Performed by: INTERNAL MEDICINE

## 2024-02-20 PROCEDURE — 1090F PRES/ABSN URINE INCON ASSESS: CPT | Performed by: INTERNAL MEDICINE

## 2024-02-20 PROCEDURE — 3074F SYST BP LT 130 MM HG: CPT | Performed by: INTERNAL MEDICINE

## 2024-02-20 PROCEDURE — 1123F ACP DISCUSS/DSCN MKR DOCD: CPT | Performed by: INTERNAL MEDICINE

## 2024-02-20 PROCEDURE — G8427 DOCREV CUR MEDS BY ELIG CLIN: HCPCS | Performed by: INTERNAL MEDICINE

## 2024-02-20 PROCEDURE — 99214 OFFICE O/P EST MOD 30 MIN: CPT | Performed by: INTERNAL MEDICINE

## 2024-02-20 PROCEDURE — 93000 ELECTROCARDIOGRAM COMPLETE: CPT | Performed by: INTERNAL MEDICINE

## 2024-02-20 PROCEDURE — G8399 PT W/DXA RESULTS DOCUMENT: HCPCS | Performed by: INTERNAL MEDICINE

## 2024-02-20 PROCEDURE — G8420 CALC BMI NORM PARAMETERS: HCPCS | Performed by: INTERNAL MEDICINE

## 2024-02-20 PROCEDURE — G8484 FLU IMMUNIZE NO ADMIN: HCPCS | Performed by: INTERNAL MEDICINE

## 2024-02-20 NOTE — PROGRESS NOTES
Chief Complaint   Patient presents with    1 Year Follow Up    Hypertension       Pat Last seen 04/20215 and came back in oc 2021 for Abnormal EKG, was done due to pre-surgery. For foot surgery      Patient here today for a 1 year follow up    EKG done today     Some sob on marked exertion    Denied chest pain, leg edema    Hx of occasional palpitation    No chest pain of long while    Sometimes dizziness when look up and moving around  Or turning    Palpitation has for long time  Intermittent    Nonsmoker    FHX  Mother had afib and chf  Sister had mI at 56  Brother Valve replacement for RHD      Patient Active Problem List   Diagnosis    Hyperlipemia    PUD (peptic ulcer disease)    Osteopenia    Obstructive sleep apnea    Daytime somnolence    Restless sleeper    Fatigue    Difficulty sleeping    Morning headache    Hypertension    Arthritis    Seasonal allergies    Hypothyroidism    Intermittent palpitations    Dizziness for yrs on getting up- once in a while    GERD (gastroesophageal reflux disease)    Gait disturbance    SOB (shortness of breath) on exertion       Past Surgical History:   Procedure Laterality Date    BUNIONECTOMY  01/01/1991    rt    COLONOSCOPY  01/01/2005    Due 2014 Dr Szymanski     DENTAL SURGERY  01/01/1975    wisdom teeth extraction    HYSTERECTOMY (CERVIX STATUS UNKNOWN)  01/01/2001    total    KNEE ARTHROSCOPY  01/01/2004    lt meniscus repair     LAPAROSCOPY  01/01/1983    abd infection    SKIN CANCER EXCISION  07/17/2013    Dr Mami KITCHEN AND BSO (CERVIX REMOVED) Bilateral 01/01/2001       Allergies   Allergen Reactions    Amoxicillin Diarrhea    Darvon [Propoxyphene Hcl] Other (See Comments)     Feeling of extremity numbness.    Seasonal       cat, grass, dust, mold    Sudafed [Pseudoephedrine Hcl] Other (See Comments)     Restless feeling.        Family History   Problem Relation Age of Onset    High Blood Pressure Father     Other Father 68        abd anyuerism    Heart Disease

## 2024-05-28 ENCOUNTER — HOSPITAL ENCOUNTER (OUTPATIENT)
Dept: WOMENS IMAGING | Age: 74
Discharge: HOME | End: 2024-05-28
Payer: MEDICARE

## 2024-05-28 DIAGNOSIS — Z12.31 VISIT FOR SCREENING MAMMOGRAM: ICD-10-CM

## 2024-05-28 PROCEDURE — 77063 BREAST TOMOSYNTHESIS BI: CPT

## 2024-10-24 ENCOUNTER — LAB (OUTPATIENT)
Dept: LAB | Age: 74
End: 2024-10-24

## 2024-10-24 LAB
25(OH)D3 SERPL-MCNC: 56 NG/ML (ref 30–100)
ALBUMIN SERPL BCG-MCNC: 4.1 G/DL (ref 3.5–5.1)
ALP SERPL-CCNC: 130 U/L (ref 38–126)
ALT SERPL W/O P-5'-P-CCNC: 11 U/L (ref 11–66)
ANION GAP SERPL CALC-SCNC: 10 MEQ/L (ref 8–16)
AST SERPL-CCNC: 16 U/L (ref 5–40)
BASOPHILS ABSOLUTE: 0.1 THOU/MM3 (ref 0–0.1)
BASOPHILS NFR BLD AUTO: 0.8 %
BILIRUB SERPL-MCNC: 0.4 MG/DL (ref 0.3–1.2)
BUN SERPL-MCNC: 17 MG/DL (ref 7–22)
CALCIUM SERPL-MCNC: 9.6 MG/DL (ref 8.5–10.5)
CHLORIDE SERPL-SCNC: 103 MEQ/L (ref 98–111)
CO2 SERPL-SCNC: 29 MEQ/L (ref 23–33)
CREAT SERPL-MCNC: 0.9 MG/DL (ref 0.4–1.2)
DEPRECATED RDW RBC AUTO: 46.4 FL (ref 35–45)
EOSINOPHIL NFR BLD AUTO: 1.3 %
EOSINOPHILS ABSOLUTE: 0.1 THOU/MM3 (ref 0–0.4)
ERYTHROCYTE [DISTWIDTH] IN BLOOD BY AUTOMATED COUNT: 13.2 % (ref 11.5–14.5)
GFR SERPL CREATININE-BSD FRML MDRD: 68 ML/MIN/1.73M2
GLUCOSE SERPL-MCNC: 100 MG/DL (ref 70–108)
HCT VFR BLD AUTO: 40.6 % (ref 37–47)
HGB BLD-MCNC: 12.7 GM/DL (ref 12–16)
IMM GRANULOCYTES # BLD AUTO: 0.02 THOU/MM3 (ref 0–0.07)
IMM GRANULOCYTES NFR BLD AUTO: 0.3 %
LYMPHOCYTES ABSOLUTE: 2.3 THOU/MM3 (ref 1–4.8)
LYMPHOCYTES NFR BLD AUTO: 31.5 %
MCH RBC QN AUTO: 29.8 PG (ref 26–33)
MCHC RBC AUTO-ENTMCNC: 31.3 GM/DL (ref 32.2–35.5)
MCV RBC AUTO: 95.3 FL (ref 81–99)
MONOCYTES ABSOLUTE: 0.6 THOU/MM3 (ref 0.4–1.3)
MONOCYTES NFR BLD AUTO: 8 %
NEUTROPHILS ABSOLUTE: 4.2 THOU/MM3 (ref 1.8–7.7)
NEUTROPHILS NFR BLD AUTO: 58.1 %
NRBC BLD AUTO-RTO: 0 /100 WBC
PLATELET # BLD AUTO: 353 THOU/MM3 (ref 130–400)
PMV BLD AUTO: 9.8 FL (ref 9.4–12.4)
POTASSIUM SERPL-SCNC: 4.2 MEQ/L (ref 3.5–5.2)
PROT SERPL-MCNC: 7 G/DL (ref 6.1–8)
RBC # BLD AUTO: 4.26 MILL/MM3 (ref 4.2–5.4)
SODIUM SERPL-SCNC: 142 MEQ/L (ref 135–145)
TSH SERPL DL<=0.005 MIU/L-ACNC: 1.57 UIU/ML (ref 0.4–4.2)
WBC # BLD AUTO: 7.2 THOU/MM3 (ref 4.8–10.8)

## 2025-01-23 ENCOUNTER — LAB (OUTPATIENT)
Dept: LAB | Age: 75
End: 2025-01-23

## 2025-01-23 ENCOUNTER — HOSPITAL ENCOUNTER (OUTPATIENT)
Age: 75
Discharge: HOME | End: 2025-01-25
Payer: MEDICARE

## 2025-01-23 DIAGNOSIS — R00.2 PALPITATIONS: ICD-10-CM

## 2025-01-23 LAB
ANION GAP SERPL CALC-SCNC: 11 MEQ/L (ref 8–16)
BASOPHILS ABSOLUTE: 0 THOU/MM3 (ref 0–0.1)
BASOPHILS NFR BLD AUTO: 0.8 %
BUN SERPL-MCNC: 18 MG/DL (ref 7–22)
CALCIUM SERPL-MCNC: 9.6 MG/DL (ref 8.5–10.5)
CHLORIDE SERPL-SCNC: 101 MEQ/L (ref 98–111)
CO2 SERPL-SCNC: 26 MEQ/L (ref 23–33)
CREAT SERPL-MCNC: 1 MG/DL (ref 0.4–1.2)
DEPRECATED RDW RBC AUTO: 45 FL (ref 35–45)
EOSINOPHIL NFR BLD AUTO: 0.3 %
EOSINOPHILS ABSOLUTE: 0 THOU/MM3 (ref 0–0.4)
ERYTHROCYTE [DISTWIDTH] IN BLOOD BY AUTOMATED COUNT: 13.2 % (ref 11.5–14.5)
GFR SERPL CREATININE-BSD FRML MDRD: 59 ML/MIN/1.73M2
GLUCOSE SERPL-MCNC: 100 MG/DL (ref 70–108)
HCT VFR BLD AUTO: 43.3 % (ref 37–47)
HGB BLD-MCNC: 13.8 GM/DL (ref 12–16)
IMM GRANULOCYTES # BLD AUTO: 0.02 THOU/MM3 (ref 0–0.07)
IMM GRANULOCYTES NFR BLD AUTO: 0.3 %
LYMPHOCYTES ABSOLUTE: 1.6 THOU/MM3 (ref 1–4.8)
LYMPHOCYTES NFR BLD AUTO: 27.7 %
MCH RBC QN AUTO: 29.6 PG (ref 26–33)
MCHC RBC AUTO-ENTMCNC: 31.9 GM/DL (ref 32.2–35.5)
MCV RBC AUTO: 92.7 FL (ref 81–99)
MONOCYTES ABSOLUTE: 0.5 THOU/MM3 (ref 0.4–1.3)
MONOCYTES NFR BLD AUTO: 8 %
NEUTROPHILS ABSOLUTE: 3.7 THOU/MM3 (ref 1.8–7.7)
NEUTROPHILS NFR BLD AUTO: 62.9 %
NRBC BLD AUTO-RTO: 0 /100 WBC
PLATELET # BLD AUTO: 364 THOU/MM3 (ref 130–400)
PMV BLD AUTO: 9.5 FL (ref 9.4–12.4)
POTASSIUM SERPL-SCNC: 4 MEQ/L (ref 3.5–5.2)
RBC # BLD AUTO: 4.67 MILL/MM3 (ref 4.2–5.4)
SODIUM SERPL-SCNC: 138 MEQ/L (ref 135–145)
TSH SERPL DL<=0.005 MIU/L-ACNC: 1.21 UIU/ML (ref 0.4–4.2)
WBC # BLD AUTO: 5.9 THOU/MM3 (ref 4.8–10.8)

## 2025-01-23 PROCEDURE — 93270 REMOTE 30 DAY ECG REV/REPORT: CPT

## 2025-02-20 ENCOUNTER — OFFICE VISIT (OUTPATIENT)
Dept: CARDIOLOGY CLINIC | Age: 75
End: 2025-02-20
Payer: MEDICARE

## 2025-02-20 VITALS
SYSTOLIC BLOOD PRESSURE: 144 MMHG | WEIGHT: 153.2 LBS | DIASTOLIC BLOOD PRESSURE: 82 MMHG | HEIGHT: 66 IN | HEART RATE: 62 BPM | BODY MASS INDEX: 24.62 KG/M2

## 2025-02-20 DIAGNOSIS — I10 PRIMARY HYPERTENSION: ICD-10-CM

## 2025-02-20 DIAGNOSIS — R07.89 CHEST DISCOMFORT: Primary | ICD-10-CM

## 2025-02-20 DIAGNOSIS — R42 DIZZINESS: ICD-10-CM

## 2025-02-20 DIAGNOSIS — E78.00 PURE HYPERCHOLESTEROLEMIA: ICD-10-CM

## 2025-02-20 DIAGNOSIS — R00.2 INTERMITTENT PALPITATIONS: ICD-10-CM

## 2025-02-20 DIAGNOSIS — R06.02 SOB (SHORTNESS OF BREATH) ON EXERTION: ICD-10-CM

## 2025-02-20 PROCEDURE — G8420 CALC BMI NORM PARAMETERS: HCPCS | Performed by: INTERNAL MEDICINE

## 2025-02-20 PROCEDURE — 3077F SYST BP >= 140 MM HG: CPT | Performed by: INTERNAL MEDICINE

## 2025-02-20 PROCEDURE — 1123F ACP DISCUSS/DSCN MKR DOCD: CPT | Performed by: INTERNAL MEDICINE

## 2025-02-20 PROCEDURE — 3079F DIAST BP 80-89 MM HG: CPT | Performed by: INTERNAL MEDICINE

## 2025-02-20 PROCEDURE — 99214 OFFICE O/P EST MOD 30 MIN: CPT | Performed by: INTERNAL MEDICINE

## 2025-02-20 PROCEDURE — G8399 PT W/DXA RESULTS DOCUMENT: HCPCS | Performed by: INTERNAL MEDICINE

## 2025-02-20 PROCEDURE — 1036F TOBACCO NON-USER: CPT | Performed by: INTERNAL MEDICINE

## 2025-02-20 PROCEDURE — G8427 DOCREV CUR MEDS BY ELIG CLIN: HCPCS | Performed by: INTERNAL MEDICINE

## 2025-02-20 PROCEDURE — 1090F PRES/ABSN URINE INCON ASSESS: CPT | Performed by: INTERNAL MEDICINE

## 2025-02-20 PROCEDURE — 3017F COLORECTAL CA SCREEN DOC REV: CPT | Performed by: INTERNAL MEDICINE

## 2025-02-20 PROCEDURE — 1160F RVW MEDS BY RX/DR IN RCRD: CPT | Performed by: INTERNAL MEDICINE

## 2025-02-20 PROCEDURE — 93000 ELECTROCARDIOGRAM COMPLETE: CPT | Performed by: INTERNAL MEDICINE

## 2025-02-20 PROCEDURE — 1159F MED LIST DOCD IN RCRD: CPT | Performed by: INTERNAL MEDICINE

## 2025-02-20 NOTE — PROGRESS NOTES
vitamin C (ASCORBIC ACID) 500 MG tablet Take 1 tablet by mouth daily      Krill Oil 500 MG CAPS Take 1 tablet by mouth daily      Loperamide HCl (IMODIUM PO) Take  by mouth daily as needed.       No current facility-administered medications for this visit.       Review of Systems -     General ROS: negative  Psychological ROS: negative  Hematological and Lymphatic ROS: No history of blood clots or bleeding disorder.   Respiratory ROS: no cough, shortness of breath, or wheezing  Cardiovascular ROS: no chest pain or dyspnea on exertion  Gastrointestinal ROS: negative  Genito-Urinary ROS: no dysuria, trouble voiding, or hematuria  Musculoskeletal ROS: negative  Neurological ROS: no TIA or stroke symptoms  Dermatological ROS: negative      Blood pressure (!) 144/82, pulse 62, height 1.676 m (5' 6\"), weight 69.5 kg (153 lb 3.2 oz).        Physical Examination:    General appearance - alert, well appearing, and in no distress  Mental status - alert, oriented to person, place, and time  Neck - supple, no significant adenopathy, no JVD, or carotid bruits  Chest - clear to auscultation, no wheezes, rales or rhonchi, symmetric air entry  Heart - normal rate, regular rhythm, normal S1, S2, no murmurs, rubs, clicks or gallops  Abdomen - soft, nontender, nondistended, no masses or organomegaly  Neurological - alert, oriented, normal speech, no focal findings or movement disorder noted  Musculoskeletal - no joint tenderness, deformity or swelling  Extremities - peripheral pulses normal, no pedal edema, no clubbing or cyanosis  Skin - normal coloration and turgor, no rashes, no suspicious skin lesions noted    Lab  No results for input(s): \"CKTOTAL\", \"CKMB\", \"CKMBINDEX\", \"TROPONINI\" in the last 72 hours.  CBC:   Lab Results   Component Value Date/Time    WBC 5.9 01/23/2025 02:38 PM    RBC 4.67 01/23/2025 02:38 PM    RBC 4.21 06/17/2022 11:38 AM    HGB 13.8 01/23/2025 02:38 PM    HCT 43.3 01/23/2025 02:38 PM    MCV 92.7 01/23/2025

## 2025-02-26 ENCOUNTER — LAB (OUTPATIENT)
Dept: LAB | Age: 75
End: 2025-02-26

## 2025-02-26 DIAGNOSIS — R07.89 CHEST DISCOMFORT: ICD-10-CM

## 2025-02-26 DIAGNOSIS — E78.00 PURE HYPERCHOLESTEROLEMIA: ICD-10-CM

## 2025-02-26 DIAGNOSIS — I10 PRIMARY HYPERTENSION: ICD-10-CM

## 2025-02-26 DIAGNOSIS — R42 DIZZINESS: ICD-10-CM

## 2025-02-26 DIAGNOSIS — R06.02 SOB (SHORTNESS OF BREATH) ON EXERTION: ICD-10-CM

## 2025-02-26 DIAGNOSIS — R00.2 INTERMITTENT PALPITATIONS: ICD-10-CM

## 2025-02-26 LAB
ALBUMIN SERPL BCG-MCNC: 4.3 G/DL (ref 3.4–4.9)
ALP SERPL-CCNC: 136 U/L (ref 35–104)
ALT SERPL W/O P-5'-P-CCNC: 13 U/L (ref 10–35)
AST SERPL-CCNC: 21 U/L (ref 10–35)
BILIRUB CONJ SERPL-MCNC: 0.3 MG/DL (ref 0–0.2)
BILIRUB SERPL-MCNC: 0.8 MG/DL (ref 0.3–1.2)
CHOLEST SERPL-MCNC: 251 MG/DL (ref 100–199)
HDLC SERPL-MCNC: 64 MG/DL
LDLC SERPL CALC-MCNC: 170 MG/DL
PROT SERPL-MCNC: 7.7 G/DL (ref 6.4–8.3)
TRIGL SERPL-MCNC: 86 MG/DL (ref 0–199)

## 2025-03-05 ENCOUNTER — HOSPITAL ENCOUNTER (OUTPATIENT)
Dept: NUCLEAR MEDICINE | Age: 75
Discharge: HOME | End: 2025-03-05
Attending: INTERNAL MEDICINE
Payer: MEDICARE

## 2025-03-05 ENCOUNTER — HOSPITAL ENCOUNTER (OUTPATIENT)
Age: 75
Discharge: HOME | End: 2025-03-07
Attending: INTERNAL MEDICINE
Payer: MEDICARE

## 2025-03-05 VITALS — HEIGHT: 66 IN | BODY MASS INDEX: 23.3 KG/M2 | WEIGHT: 145 LBS

## 2025-03-05 DIAGNOSIS — R06.02 SOB (SHORTNESS OF BREATH) ON EXERTION: ICD-10-CM

## 2025-03-05 DIAGNOSIS — R42 DIZZINESS: ICD-10-CM

## 2025-03-05 DIAGNOSIS — I10 PRIMARY HYPERTENSION: ICD-10-CM

## 2025-03-05 DIAGNOSIS — R07.89 CHEST DISCOMFORT: ICD-10-CM

## 2025-03-05 DIAGNOSIS — R00.2 INTERMITTENT PALPITATIONS: ICD-10-CM

## 2025-03-05 DIAGNOSIS — E78.00 PURE HYPERCHOLESTEROLEMIA: ICD-10-CM

## 2025-03-05 LAB
ECHO AO ASC DIAM: 3.2 CM
ECHO AV CUSP MM: 1.7 CM
ECHO AV PEAK GRADIENT: 6 MMHG
ECHO AV PEAK VELOCITY: 1.2 M/S
ECHO AV VELOCITY RATIO: 0.83
ECHO BSA: 1.75 M2
ECHO EST RA PRESSURE: 5 MMHG
ECHO LA AREA 2C: 8.9 CM2
ECHO LA AREA 4C: 8.8 CM2
ECHO LA DIAMETER: 3.5 CM
ECHO LA MAJOR AXIS: 4.1 CM
ECHO LA MINOR AXIS: 3.6 CM
ECHO LA VOL BP: 18 ML (ref 22–52)
ECHO LA VOL MOD A2C: 17 ML (ref 22–52)
ECHO LA VOL MOD A4C: 16 ML (ref 22–52)
ECHO LV E' LATERAL VELOCITY: 10.8 CM/S
ECHO LV E' SEPTAL VELOCITY: 6.1 CM/S
ECHO LV EJECTION FRACTION 3D: 57 %
ECHO LV FRACTIONAL SHORTENING: 32 % (ref 28–44)
ECHO LV INTERNAL DIMENSION DIASTOLIC: 4.1 CM (ref 3.9–5.3)
ECHO LV INTERNAL DIMENSION SYSTOLIC: 2.8 CM
ECHO LV ISOVOLUMETRIC RELAXATION TIME (IVRT): 95 MS
ECHO LV IVSD: 0.9 CM (ref 0.6–0.9)
ECHO LV MASS 2D: 123 G (ref 67–162)
ECHO LV POSTERIOR WALL DIASTOLIC: 1 CM (ref 0.6–0.9)
ECHO LV RELATIVE WALL THICKNESS RATIO: 0.49
ECHO LVOT PEAK GRADIENT: 4 MMHG
ECHO LVOT PEAK VELOCITY: 1 M/S
ECHO MV A VELOCITY: 0.81 M/S
ECHO MV E DECELERATION TIME (DT): 223 MS
ECHO MV E VELOCITY: 0.79 M/S
ECHO MV E/A RATIO: 0.98
ECHO MV E/E' LATERAL: 7.31
ECHO MV E/E' RATIO (AVERAGED): 10.13
ECHO MV E/E' SEPTAL: 12.95
ECHO MV REGURGITANT PEAK GRADIENT: 77 MMHG
ECHO MV REGURGITANT PEAK VELOCITY: 4.4 M/S
ECHO PV MAX VELOCITY: 0.5 M/S
ECHO PV PEAK GRADIENT: 1 MMHG
ECHO RIGHT VENTRICULAR SYSTOLIC PRESSURE (RVSP): 30 MMHG
ECHO RV INTERNAL DIMENSION: 2.6 CM
ECHO RV TAPSE: 2.2 CM (ref 1.7–?)
ECHO TV E WAVE: 0.4 M/S
ECHO TV REGURGITANT MAX VELOCITY: 2.49 M/S
ECHO TV REGURGITANT PEAK GRADIENT: 25 MMHG
NUC STRESS EJECTION FRACTION: 78 %
STRESS ANGINA INDEX: 0
STRESS BASELINE DIAS BP: 91 MMHG
STRESS BASELINE HR: 56 BPM
STRESS BASELINE ST DEPRESSION: 0 MM
STRESS BASELINE SYS BP: 142 MMHG
STRESS ESTIMATED WORKLOAD: 10 METS
STRESS EXERCISE DUR MIN: 6 MIN
STRESS EXERCISE DUR SEC: 59 SEC
STRESS PEAK DIAS BP: 100 MMHG
STRESS PEAK SYS BP: 210 MMHG
STRESS PERCENT HR ACHIEVED: 99 %
STRESS POST PEAK HR: 146 BPM
STRESS RATE PRESSURE PRODUCT: NORMAL BPM*MMHG
STRESS SR DUKE TREADMILL SCORE: 7
STRESS ST DEPRESSION: 0 MM
STRESS STAGE 1 BP: NORMAL MMHG
STRESS STAGE 1 DURATION: 3 MIN:SEC
STRESS STAGE 1 HR: 107 BPM
STRESS STAGE 2 BP: NORMAL MMHG
STRESS STAGE 2 DURATION: 3 MIN:SEC
STRESS STAGE 2 HR: 134 BPM
STRESS STAGE 3 DURATION: 1 MIN:SEC
STRESS STAGE 3 HR: 146 BPM
STRESS STAGE RECOVERY 1 BP: NORMAL MMHG
STRESS STAGE RECOVERY 1 DURATION: 1 MIN:SEC
STRESS STAGE RECOVERY 1 HR: 146 BPM
STRESS STAGE RECOVERY 2 BP: NORMAL MMHG
STRESS STAGE RECOVERY 2 DURATION: 1 MIN:SEC
STRESS STAGE RECOVERY 2 HR: 117 BPM
STRESS STAGE RECOVERY 3 BP: NORMAL MMHG
STRESS STAGE RECOVERY 3 DURATION: 3 MIN:SEC
STRESS STAGE RECOVERY 3 HR: 76 BPM
STRESS STANDING DIAS BP: 91 MMHG
STRESS STANDING HR: 59 BPM
STRESS STANDING SYS BP: 142 MMHG
STRESS TARGET HR: 147 BPM
TID: 0.77

## 2025-03-05 PROCEDURE — A9500 TC99M SESTAMIBI: HCPCS | Performed by: INTERNAL MEDICINE

## 2025-03-05 PROCEDURE — 93017 CV STRESS TEST TRACING ONLY: CPT

## 2025-03-05 PROCEDURE — 78452 HT MUSCLE IMAGE SPECT MULT: CPT

## 2025-03-05 PROCEDURE — 3430000000 HC RX DIAGNOSTIC RADIOPHARMACEUTICAL: Performed by: INTERNAL MEDICINE

## 2025-03-05 PROCEDURE — 93306 TTE W/DOPPLER COMPLETE: CPT

## 2025-03-05 RX ORDER — TETRAKIS(2-METHOXYISOBUTYLISOCYANIDE)COPPER(I) TETRAFLUOROBORATE 1 MG/ML
9.4 INJECTION, POWDER, LYOPHILIZED, FOR SOLUTION INTRAVENOUS
Status: COMPLETED | OUTPATIENT
Start: 2025-03-05 | End: 2025-03-05

## 2025-03-05 RX ORDER — TETRAKIS(2-METHOXYISOBUTYLISOCYANIDE)COPPER(I) TETRAFLUOROBORATE 1 MG/ML
30.5 INJECTION, POWDER, LYOPHILIZED, FOR SOLUTION INTRAVENOUS
Status: COMPLETED | OUTPATIENT
Start: 2025-03-05 | End: 2025-03-05

## 2025-03-05 RX ADMIN — Medication 9.4 MILLICURIE: at 12:35

## 2025-03-05 RX ADMIN — Medication 30.5 MILLICURIE: at 13:40

## 2025-03-06 ENCOUNTER — TELEPHONE (OUTPATIENT)
Dept: CARDIOLOGY CLINIC | Age: 75
End: 2025-03-06

## 2025-03-06 DIAGNOSIS — E78.00 PURE HYPERCHOLESTEROLEMIA: Primary | ICD-10-CM

## 2025-03-06 NOTE — TELEPHONE ENCOUNTER
PER DR HA PATIENT'S CHOLESTEROL IS HIGH AND SHE NEEDS TO BE ON A PCSK9, REPATHA OR PRALUENT.    SPOKE WITH PATIENT AND SHE STATES SHE HAS BEEN SLACKING ON HER EXERCISE AND HEALTHY EATING.  SHE STATES SHE IS GETTING BACK TO A BETTER DIET AND EXERCISE PROGRAM AND DOES NOT WANT TO START THE INJECTABLE MEDICATIONS AT THIS TIME.    STATES SHE WILL DISCUSS THIS WITH DR HA AT HER MAY APPT.

## 2025-03-06 NOTE — TELEPHONE ENCOUNTER
Noted   May need to come with new Lipid panel then to see effect of her diet and exercise and help us make decision

## 2025-03-07 NOTE — TELEPHONE ENCOUNTER
Spoke to patient, notified.  Lipid panel ordered.  She will have drawn at [facility] before her May appt.

## 2025-04-30 ENCOUNTER — HOSPITAL ENCOUNTER (OUTPATIENT)
Dept: WOMENS IMAGING | Age: 75
Discharge: HOME | End: 2025-04-30
Payer: MEDICARE

## 2025-04-30 VITALS — WEIGHT: 145 LBS | HEIGHT: 66 IN | BODY MASS INDEX: 23.3 KG/M2

## 2025-04-30 DIAGNOSIS — R23.4 CHANGES IN SKIN TEXTURE: ICD-10-CM

## 2025-04-30 DIAGNOSIS — N64.4 BREAST PAIN: ICD-10-CM

## 2025-04-30 PROCEDURE — G0279 TOMOSYNTHESIS, MAMMO: HCPCS

## 2025-04-30 PROCEDURE — 76642 ULTRASOUND BREAST LIMITED: CPT

## 2025-05-08 ENCOUNTER — TRANSCRIBE ORDERS (OUTPATIENT)
Dept: ADMINISTRATIVE | Age: 75
End: 2025-05-08

## 2025-05-08 DIAGNOSIS — R10.11 RUQ PAIN: Primary | ICD-10-CM

## 2025-05-19 ENCOUNTER — HOSPITAL ENCOUNTER (OUTPATIENT)
Age: 75
Discharge: HOME | End: 2025-05-19
Payer: MEDICARE

## 2025-05-19 ENCOUNTER — HOSPITAL ENCOUNTER (OUTPATIENT)
Dept: ULTRASOUND IMAGING | Age: 75
Discharge: HOME | End: 2025-05-19
Payer: MEDICARE

## 2025-05-19 DIAGNOSIS — E78.00 PURE HYPERCHOLESTEROLEMIA: ICD-10-CM

## 2025-05-19 DIAGNOSIS — R10.11 RUQ PAIN: ICD-10-CM

## 2025-05-19 LAB
CHOLESTEROL, FASTING: 215 MG/DL (ref 100–199)
HDLC SERPL-MCNC: 57 MG/DL
LDLC SERPL CALC-MCNC: 136 MG/DL
TRIGLYCERIDE, FASTING: 109 MG/DL (ref 0–199)

## 2025-05-19 PROCEDURE — 36415 COLL VENOUS BLD VENIPUNCTURE: CPT

## 2025-05-19 PROCEDURE — 80061 LIPID PANEL: CPT

## 2025-05-19 PROCEDURE — 76705 ECHO EXAM OF ABDOMEN: CPT

## 2025-05-22 ENCOUNTER — OFFICE VISIT (OUTPATIENT)
Dept: CARDIOLOGY CLINIC | Age: 75
End: 2025-05-22
Payer: MEDICARE

## 2025-05-22 VITALS
SYSTOLIC BLOOD PRESSURE: 127 MMHG | BODY MASS INDEX: 23.09 KG/M2 | DIASTOLIC BLOOD PRESSURE: 83 MMHG | WEIGHT: 143 LBS | HEART RATE: 61 BPM

## 2025-05-22 DIAGNOSIS — E78.00 PURE HYPERCHOLESTEROLEMIA: ICD-10-CM

## 2025-05-22 DIAGNOSIS — R00.2 INTERMITTENT PALPITATIONS: ICD-10-CM

## 2025-05-22 DIAGNOSIS — I10 PRIMARY HYPERTENSION: Primary | ICD-10-CM

## 2025-05-22 DIAGNOSIS — R06.02 SOB (SHORTNESS OF BREATH) ON EXERTION: ICD-10-CM

## 2025-05-22 DIAGNOSIS — R42 DIZZINESS: ICD-10-CM

## 2025-05-22 PROBLEM — R07.89 CHEST DISCOMFORT: Status: RESOLVED | Noted: 2025-02-20 | Resolved: 2025-05-22

## 2025-05-22 PROCEDURE — 99214 OFFICE O/P EST MOD 30 MIN: CPT | Performed by: INTERNAL MEDICINE

## 2025-05-22 PROCEDURE — 3079F DIAST BP 80-89 MM HG: CPT | Performed by: INTERNAL MEDICINE

## 2025-05-22 PROCEDURE — 1036F TOBACCO NON-USER: CPT | Performed by: INTERNAL MEDICINE

## 2025-05-22 PROCEDURE — 1160F RVW MEDS BY RX/DR IN RCRD: CPT | Performed by: INTERNAL MEDICINE

## 2025-05-22 PROCEDURE — G8427 DOCREV CUR MEDS BY ELIG CLIN: HCPCS | Performed by: INTERNAL MEDICINE

## 2025-05-22 PROCEDURE — G8399 PT W/DXA RESULTS DOCUMENT: HCPCS | Performed by: INTERNAL MEDICINE

## 2025-05-22 PROCEDURE — 3017F COLORECTAL CA SCREEN DOC REV: CPT | Performed by: INTERNAL MEDICINE

## 2025-05-22 PROCEDURE — G8420 CALC BMI NORM PARAMETERS: HCPCS | Performed by: INTERNAL MEDICINE

## 2025-05-22 PROCEDURE — 1123F ACP DISCUSS/DSCN MKR DOCD: CPT | Performed by: INTERNAL MEDICINE

## 2025-05-22 PROCEDURE — 1090F PRES/ABSN URINE INCON ASSESS: CPT | Performed by: INTERNAL MEDICINE

## 2025-05-22 PROCEDURE — 1159F MED LIST DOCD IN RCRD: CPT | Performed by: INTERNAL MEDICINE

## 2025-05-22 PROCEDURE — 3074F SYST BP LT 130 MM HG: CPT | Performed by: INTERNAL MEDICINE

## 2025-05-22 RX ORDER — PANTOPRAZOLE SODIUM 40 MG/1
40 TABLET, DELAYED RELEASE ORAL DAILY
COMMUNITY

## 2025-05-22 NOTE — PROGRESS NOTES
Chief Complaint   Patient presents with    3 Month Follow-Up       Pat Last seen 04/20215 and came back in oc 2021 for Abnormal EKG, was done due to pre-surgery. For foot surgery        Patient here for a 3 month fu    EKG done 2/20/25    Denies  chest pain, sob,  dizziness and SONNY     No more chest discomfort,      Some sob on marked exertion    Denied  leg edema    Hx of occasional palpitation    No chest pain of long while    Hx of Sometimes dizziness when look up and moving around  Or turning    Palpitation has for long time  Intermittent    Nonsmoker    FHX  Mother had afib and chf  Sister had mI at 56  Brother Valve replacement for RHD      Patient Active Problem List   Diagnosis    Hyperlipemia    PUD (peptic ulcer disease)    Osteopenia    Obstructive sleep apnea    Daytime somnolence    Restless sleeper    Fatigue    Difficulty sleeping    Morning headache    Hypertension    Arthritis    Seasonal allergies    Hypothyroidism    Intermittent palpitations    Dizziness for yrs on getting up- once in a while    GERD (gastroesophageal reflux disease)    Gait disturbance    SOB (shortness of breath) on exertion       Past Surgical History:   Procedure Laterality Date    BUNIONECTOMY  01/01/1991    rt    COLONOSCOPY  01/01/2005    Due 2014 Dr Szymanski     DENTAL SURGERY  01/01/1975    wisdom teeth extraction    HYSTERECTOMY (CERVIX STATUS UNKNOWN)  01/01/2001    total    KNEE ARTHROSCOPY  01/01/2004    lt meniscus repair     LAPAROSCOPY  01/01/1983    abd infection    SKIN CANCER EXCISION  07/17/2013    Dr Mami KITCHEN AND BSO (CERVIX REMOVED) Bilateral 01/01/2001       Allergies   Allergen Reactions    Amoxicillin Diarrhea    Darvon [Propoxyphene Hcl] Other (See Comments)     Feeling of extremity numbness.    Environmental/Seasonal       cat, grass, dust, mold    Sudafed [Pseudoephedrine Hcl] Other (See Comments)     Restless feeling.        Family History   Problem Relation Age of Onset    Thyroid Disease